# Patient Record
Sex: FEMALE | Race: BLACK OR AFRICAN AMERICAN | NOT HISPANIC OR LATINO | Employment: OTHER | URBAN - METROPOLITAN AREA
[De-identification: names, ages, dates, MRNs, and addresses within clinical notes are randomized per-mention and may not be internally consistent; named-entity substitution may affect disease eponyms.]

---

## 2022-12-03 ENCOUNTER — HOSPITAL ENCOUNTER (EMERGENCY)
Facility: HOSPITAL | Age: 62
Discharge: HOME | End: 2022-12-03
Attending: EMERGENCY MEDICINE
Payer: MEDICARE

## 2022-12-03 VITALS
HEART RATE: 70 BPM | DIASTOLIC BLOOD PRESSURE: 76 MMHG | TEMPERATURE: 98 F | BODY MASS INDEX: 36.1 KG/M2 | RESPIRATION RATE: 18 BRPM | WEIGHT: 230 LBS | SYSTOLIC BLOOD PRESSURE: 130 MMHG | HEIGHT: 67 IN | OXYGEN SATURATION: 98 %
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
## 2022-12-03 DIAGNOSIS — S09.90XA INJURY OF HEAD, INITIAL ENCOUNTER: ICD-10-CM

## 2022-12-03 DIAGNOSIS — S89.91XA RIGHT KNEE INJURY: ICD-10-CM

## 2022-12-03 DIAGNOSIS — R07.81 RIB PAIN ON RIGHT SIDE: ICD-10-CM

## 2022-12-03 PROCEDURE — 25000003 PHARM REV CODE 250: Performed by: REGISTERED NURSE

## 2022-12-03 PROCEDURE — 99284 EMERGENCY DEPT VISIT MOD MDM: CPT | Mod: 25

## 2022-12-03 RX ORDER — HYDROCODONE BITARTRATE AND ACETAMINOPHEN 10; 325 MG/1; MG/1
1 TABLET ORAL
Status: COMPLETED | OUTPATIENT
Start: 2022-12-03 | End: 2022-12-03

## 2022-12-03 RX ADMIN — HYDROCODONE BITARTRATE AND ACETAMINOPHEN 1 TABLET: 10; 325 TABLET ORAL at 11:12

## 2022-12-03 NOTE — ED NOTES
Patient identifiers verified and correct for Mirna ROE Aldair.    Pt present to er with compliant of a fall.    LOC: The patient is awake, alert and aware of environment with an appropriate affect, the patient is oriented x 3 and speaking appropriately.  APPEARANCE: Patient resting comfortably and in no acute distress, patient is clean and well groomed, patient's clothing is properly fastened.  SKIN: The skin is warm and dry, color consistent with ethnicity, patient has normal skin turgor and moist mucus membranes, skin intact, no breakdown or bruising noted.  MUSCULOSKELETAL: Patient moving all extremities spontaneously.  RESPIRATORY: Airway is open and patent, respirations are spontaneous.  CARDIAC: Patient has a normal rate, no periphreal edema noted, capillary refill < 3 seconds.  ABDOMEN: Soft and non tender to palpation.

## 2022-12-03 NOTE — ED PROVIDER NOTES
Encounter Date: 12/3/2022       History     Chief Complaint   Patient presents with    Fall     Fell while getting out of a truck fan fell due to neuropathy, hit her right leg, right head and right rib pain. Takes Lovenox.  No loss of consciousness.      62-year-old female presents emergency department after a slip trip fall earlier today.  Patient reports history of neuropathy and slipped on wet concrete hitting her right knee, ribs and head.  Patient does take blood thinners.  Patient denies any loss of consciousness, nausea/vomiting, chest pain, shortness of breath, weakness or any other symptoms at this time.    The history is provided by the patient.   Review of patient's allergies indicates:   Allergen Reactions    No known drug allergies      Past Medical History:   Diagnosis Date    Arthritis     Asthma     C. difficile colitis     Depression     Diabetes mellitus, type 2     Diabetic neuropathy     DVT (deep venous thrombosis)     Hyperlipidemia     Hypertension     Internal hemorrhoids     Pulmonary embolism     Rheumatoid arthritis(714.0)      Past Surgical History:   Procedure Laterality Date    CARPAL TUNNEL RELEASE Left     CHOLECYSTECTOMY      KNEE ARTHROSCOPY      KNEE SURGERY      TUBAL LIGATION       Family History   Problem Relation Age of Onset    Heart disease Mother     Hypertension Mother     Diabetes Father     Stroke Father     Hypertension Father     Stroke Maternal Aunt     Stroke Maternal Aunt     Stroke Sister     Breast cancer Neg Hx     Colon cancer Neg Hx     Ovarian cancer Neg Hx      Social History     Tobacco Use    Smoking status: Former     Packs/day: 0.25     Types: Cigarettes     Quit date: 2015     Years since quittin.6    Smokeless tobacco: Never    Tobacco comments:      PPD   Substance Use Topics    Alcohol use: No     Comment: pt states quit drinking 1 mo 1/2 ago    Drug use: No     Review of Systems   Constitutional:  Negative for fever.   HENT:  Negative for  sore throat.         +head injury   Respiratory:  Negative for shortness of breath.         +right rib pain   Cardiovascular:  Negative for chest pain.   Gastrointestinal:  Negative for nausea.   Genitourinary:  Negative for dysuria.   Musculoskeletal:  Positive for arthralgias. Negative for back pain.   Skin:  Negative for rash.   Neurological:  Positive for headaches. Negative for weakness.   Hematological:  Does not bruise/bleed easily.   All other systems reviewed and are negative.    Physical Exam     Initial Vitals [12/03/22 1111]   BP Pulse Resp Temp SpO2   131/75 65 20 98.4 °F (36.9 °C) 98 %      MAP       --         Physical Exam    Constitutional: She appears well-developed and well-nourished. She is not diaphoretic. No distress.   HENT:   Head: Normocephalic. Head is with contusion. Head is without raccoon's eyes and without Villalobos's sign.       Eyes: Conjunctivae and EOM are normal. Pupils are equal, round, and reactive to light.   Neck: Neck supple.   Normal range of motion.  Cardiovascular:  Normal rate, regular rhythm and normal heart sounds.           No murmur heard.  Pulmonary/Chest: Breath sounds normal. No respiratory distress. She has no wheezes. She has no rales. She exhibits tenderness.     Abdominal: Abdomen is soft. Bowel sounds are normal. There is no abdominal tenderness. There is no rebound and no guarding.   Musculoskeletal:         General: No edema. Normal range of motion.      Cervical back: Normal range of motion and neck supple.      Right knee: Tenderness present.        Legs:      Neurological: She is alert and oriented to person, place, and time. No cranial nerve deficit. GCS score is 15. GCS eye subscore is 4. GCS verbal subscore is 5. GCS motor subscore is 6.   Skin: Skin is warm and dry. Capillary refill takes less than 2 seconds.   Psychiatric: She has a normal mood and affect. Thought content normal.       ED Course   Procedures  Labs Reviewed - No data to display        Imaging Results              X-Ray Ribs 2 View Right (Final result)  Result time 12/03/22 11:48:38      Final result by Shine Tello Jr., MD (12/03/22 11:48:38)                   Impression:      No displaced rib fracture is visible.  Either infiltrate or scarring within the right lower lobe.      Electronically signed by: Shine Tello Jr., MD  Date:    12/03/2022  Time:    11:48               Narrative:    EXAMINATION:  XR RIBS 2 VIEW RIGHT    CLINICAL HISTORY:  Unspecified fall, initial encounter    TECHNIQUE:  Three views of the right ribs were performed.    COMPARISON:  None    FINDINGS:  No displaced rib fracture is evident.  There are irregular linear opacities within the periphery of the right lower lobe.  No pleural fluid or pneumothorax.  Normal heart size.                                       X-Ray Knee Complete 4 Or More Views Right (Final result)  Result time 12/03/22 12:00:22      Final result by Shine Tello Jr., MD (12/03/22 12:00:22)                   Impression:      No acute findings.  Osteoarthritis.      Electronically signed by: Shine Tello Jr., MD  Date:    12/03/2022  Time:    12:00               Narrative:    EXAMINATION:  XR KNEE COMP 4 OR MORE VIEWS RIGHT    CLINICAL HISTORY:  Unspecified injury of right lower leg, initial encounter    TECHNIQUE:  Four views of the right knee.    COMPARISON:  None    FINDINGS:  No acute fractures.  Normal joint alignment.  Tricompartmental degenerative osteoarthritis most pronounced involving the lateral compartment.  There are marginal osteophytes.  No suprapatellar effusion.                                       CT Head Without Contrast (Final result)  Result time 12/03/22 11:45:20      Final result by Shine Tello Jr., MD (12/03/22 11:45:20)                   Impression:      No acute intracranial CT abnormality.  Metallic focus lying adjacent to the right orbit near the skin surface.    All CT scans at this facility use dose modulation,  iterative reconstruction, and/or weight base dosing when appropriate to reduce radiation dose to as low as reasonably achievable.      Electronically signed by: Shine Tello Jr., MD  Date:    12/03/2022  Time:    11:45               Narrative:    EXAMINATION:  CT HEAD WITHOUT CONTRAST    CLINICAL HISTORY:  Head trauma, moderate-severe;    TECHNIQUE:  Contiguous axial CT images were obtained from the skull base through the vertex without intravenous contrast.    COMPARISON:  Prior CT of the head from 11/10/2010.    FINDINGS:  Metallic fragment lying superolateral to the right orbit.  No intracranial hemorrhage.  No mass effect or midline shift. Normal parenchymal attenuation. The ventricles and sulci are normal in size and configuration. The paranasal sinuses and mastoid air cells are clear.  No concerning osseous findings.                                       Medications   HYDROcodone-acetaminophen  mg per tablet 1 tablet (1 tablet Oral Given 12/3/22 1155)         I discussed with patient and/or family/caretaker that evaluation in the ED does not suggest any emergent or life threatening medical conditions requiring immediate intervention beyond what was provided in the ED, and I believe patient is safe for discharge.  Regardless, an unremarkable evaluation in the ED does not preclude the development or presence of a serious of life threatening condition. As such, patient was instructed to return immediately for any worsening or change in current symptoms.    I discussed with patient and/or family/caretaker that negative X-ray does not rule out occult fracture or other soft tissue injury.  Persistent pain greater than 7-10 days or increased pain requires follow up, specifically with orthopedics.                       Clinical Impression:   Final diagnoses:  [S89.91XA] Right knee injury  [W19.XXXA] Fall (Primary)  [S09.90XA] Injury of head, initial encounter  [R07.81] Rib pain on right side      ED Disposition  Condition    Discharge Stable          ED Prescriptions    None       Follow-up Information       Follow up With Specialties Details Why Contact Info    Jorge Rabago MD Family Medicine In 1 week  [address] [facility] [address]  [phone]      [facility] Emergency Dept. Emergency Medicine  If symptoms worsen [id number] [facility] [id number] [phone]             Branden Cid Jr., FNP  12/03/22 1314

## 2023-09-21 ENCOUNTER — TELEPHONE (OUTPATIENT)
Dept: HEMATOLOGY/ONCOLOGY | Facility: CLINIC | Age: 63
End: 2023-09-21
Payer: MEDICARE

## 2023-09-21 NOTE — TELEPHONE ENCOUNTER
Spoke with the pt about her appt with dr guzman as a new pt from a referral from [facility]

## 2023-11-10 ENCOUNTER — TELEPHONE (OUTPATIENT)
Dept: FAMILY MEDICINE | Facility: CLINIC | Age: 63
End: 2023-11-10
Payer: MEDICARE

## 2023-11-10 NOTE — TELEPHONE ENCOUNTER
----- Message from Monika Francois sent at 11/10/2023  9:57 AM CST -----  Regarding: patient call back  Type: Patient Call Back    Who called: Self     What is the request in detail: Said that she will not be able to come today because the transportation service said it is too late and asked if there is any way that she could come in earlier.     Can the clinic reply by [facility]? No     Would the patient rather a call back or a response via [facility]? Call     Best call back number: .[phone]

## 2023-11-10 NOTE — TELEPHONE ENCOUNTER
Spoke with patient stated she needs a morning appointment or any appointment before noon , due to transportation problem. Appointment rescheduled at ths time. Patient given first available am appointment and appointment added to wait list. Patient verbalized understanding at this time.

## 2023-11-15 ENCOUNTER — TELEPHONE (OUTPATIENT)
Dept: HEMATOLOGY/ONCOLOGY | Facility: CLINIC | Age: 63
End: 2023-11-15
Payer: MEDICARE

## 2023-11-15 NOTE — TELEPHONE ENCOUNTER
----- Message from Leyla Buckley sent at 11/15/2023 10:49 AM CST -----  .Type:  Needs Medical Advice    Who Called: Lin at [facility]    Would the patient rather a call back or a response via [facility]? Call back  Best Call Back Number:   Additional Information:     Lin would like a call back in regards to a referral that was sent over on 9/21

## 2023-12-04 LAB — INR PPP: 2.1

## 2023-12-05 ENCOUNTER — ANTI-COAG VISIT (OUTPATIENT)
Dept: CARDIOLOGY | Facility: CLINIC | Age: 63
End: 2023-12-05
Payer: MEDICARE

## 2023-12-05 ENCOUNTER — LAB VISIT (OUTPATIENT)
Dept: LAB | Facility: HOSPITAL | Age: 63
End: 2023-12-05
Attending: INTERNAL MEDICINE
Payer: MEDICARE

## 2023-12-05 ENCOUNTER — OFFICE VISIT (OUTPATIENT)
Dept: HEMATOLOGY/ONCOLOGY | Facility: CLINIC | Age: 63
End: 2023-12-05
Payer: MEDICARE

## 2023-12-05 ENCOUNTER — PATIENT MESSAGE (OUTPATIENT)
Dept: CARDIOLOGY | Facility: CLINIC | Age: 63
End: 2023-12-05
Payer: MEDICARE

## 2023-12-05 VITALS
WEIGHT: 241.19 LBS | OXYGEN SATURATION: 95 % | BODY MASS INDEX: 37.85 KG/M2 | RESPIRATION RATE: 16 BRPM | SYSTOLIC BLOOD PRESSURE: 161 MMHG | DIASTOLIC BLOOD PRESSURE: 71 MMHG | HEART RATE: 65 BPM | HEIGHT: 67 IN

## 2023-12-05 DIAGNOSIS — Z79.01 WARFARIN ANTICOAGULATION: ICD-10-CM

## 2023-12-05 DIAGNOSIS — Z12.11 ENCOUNTER FOR SCREENING COLONOSCOPY: ICD-10-CM

## 2023-12-05 DIAGNOSIS — I82.90 VTE (VENOUS THROMBOEMBOLISM): ICD-10-CM

## 2023-12-05 DIAGNOSIS — C57.00 FALLOPIAN TUBE CANCER, CARCINOMA, UNSPECIFIED LATERALITY: Primary | ICD-10-CM

## 2023-12-05 DIAGNOSIS — Z79.01 LONG TERM (CURRENT) USE OF ANTICOAGULANTS: Primary | ICD-10-CM

## 2023-12-05 DIAGNOSIS — C57.00 FALLOPIAN TUBE CANCER, CARCINOMA, UNSPECIFIED LATERALITY: ICD-10-CM

## 2023-12-05 DIAGNOSIS — Z12.31 ENCOUNTER FOR SCREENING MAMMOGRAM FOR MALIGNANT NEOPLASM OF BREAST: ICD-10-CM

## 2023-12-05 LAB
ALBUMIN SERPL BCP-MCNC: 3.8 G/DL (ref 3.5–5.2)
ALP SERPL-CCNC: 86 U/L (ref 55–135)
ALT SERPL W/O P-5'-P-CCNC: 16 U/L (ref 10–44)
ANION GAP SERPL CALC-SCNC: 10 MMOL/L (ref 8–16)
AST SERPL-CCNC: 18 U/L (ref 10–40)
BASOPHILS # BLD AUTO: 0.02 K/UL (ref 0–0.2)
BASOPHILS NFR BLD: 0.4 % (ref 0–1.9)
BILIRUB SERPL-MCNC: 0.4 MG/DL (ref 0.1–1)
BUN SERPL-MCNC: 11 MG/DL (ref 8–23)
CALCIUM SERPL-MCNC: 9.5 MG/DL (ref 8.7–10.5)
CANCER AG125 SERPL-ACNC: 6 U/ML (ref 0–30)
CHLORIDE SERPL-SCNC: 102 MMOL/L (ref 95–110)
CO2 SERPL-SCNC: 30 MMOL/L (ref 23–29)
CREAT SERPL-MCNC: 1 MG/DL (ref 0.5–1.4)
DIFFERENTIAL METHOD: ABNORMAL
EOSINOPHIL # BLD AUTO: 0.2 K/UL (ref 0–0.5)
EOSINOPHIL NFR BLD: 3 % (ref 0–8)
ERYTHROCYTE [DISTWIDTH] IN BLOOD BY AUTOMATED COUNT: 15.4 % (ref 11.5–14.5)
EST. GFR  (NO RACE VARIABLE): >60 ML/MIN/1.73 M^2
GLUCOSE SERPL-MCNC: 132 MG/DL (ref 70–110)
HCT VFR BLD AUTO: 34.6 % (ref 37–48.5)
HGB BLD-MCNC: 11.9 G/DL (ref 12–16)
IMM GRANULOCYTES # BLD AUTO: 0.01 K/UL (ref 0–0.04)
IMM GRANULOCYTES NFR BLD AUTO: 0.2 % (ref 0–0.5)
LYMPHOCYTES # BLD AUTO: 1.7 K/UL (ref 1–4.8)
LYMPHOCYTES NFR BLD: 29.6 % (ref 18–48)
MCH RBC QN AUTO: 29.8 PG (ref 27–31)
MCHC RBC AUTO-ENTMCNC: 34.4 G/DL (ref 32–36)
MCV RBC AUTO: 87 FL (ref 82–98)
MONOCYTES # BLD AUTO: 0.4 K/UL (ref 0.3–1)
MONOCYTES NFR BLD: 6.9 % (ref 4–15)
NEUTROPHILS # BLD AUTO: 3.4 K/UL (ref 1.8–7.7)
NEUTROPHILS NFR BLD: 59.9 % (ref 38–73)
NRBC BLD-RTO: 0 /100 WBC
PLATELET # BLD AUTO: 257 K/UL (ref 150–450)
PMV BLD AUTO: 10.2 FL (ref 9.2–12.9)
POTASSIUM SERPL-SCNC: 3.8 MMOL/L (ref 3.5–5.1)
PROT SERPL-MCNC: 7.9 G/DL (ref 6–8.4)
RBC # BLD AUTO: 3.99 M/UL (ref 4–5.4)
SODIUM SERPL-SCNC: 142 MMOL/L (ref 136–145)
WBC # BLD AUTO: 5.65 K/UL (ref 3.9–12.7)

## 2023-12-05 PROCEDURE — 3077F SYST BP >= 140 MM HG: CPT | Mod: CPTII,S$GLB,, | Performed by: INTERNAL MEDICINE

## 2023-12-05 PROCEDURE — 3008F BODY MASS INDEX DOCD: CPT | Mod: CPTII,S$GLB,, | Performed by: INTERNAL MEDICINE

## 2023-12-05 PROCEDURE — 3052F HG A1C>EQUAL 8.0%<EQUAL 9.0%: CPT | Mod: CPTII,S$GLB,, | Performed by: INTERNAL MEDICINE

## 2023-12-05 PROCEDURE — 3008F PR BODY MASS INDEX (BMI) DOCUMENTED: ICD-10-PCS | Mod: CPTII,S$GLB,, | Performed by: INTERNAL MEDICINE

## 2023-12-05 PROCEDURE — 1159F PR MEDICATION LIST DOCUMENTED IN MEDICAL RECORD: ICD-10-PCS | Mod: CPTII,S$GLB,, | Performed by: INTERNAL MEDICINE

## 2023-12-05 PROCEDURE — 1160F RVW MEDS BY RX/DR IN RCRD: CPT | Mod: CPTII,S$GLB,, | Performed by: INTERNAL MEDICINE

## 2023-12-05 PROCEDURE — 3288F FALL RISK ASSESSMENT DOCD: CPT | Mod: CPTII,S$GLB,, | Performed by: INTERNAL MEDICINE

## 2023-12-05 PROCEDURE — 36415 COLL VENOUS BLD VENIPUNCTURE: CPT | Performed by: INTERNAL MEDICINE

## 2023-12-05 PROCEDURE — 80053 COMPREHEN METABOLIC PANEL: CPT | Performed by: INTERNAL MEDICINE

## 2023-12-05 PROCEDURE — 99205 PR OFFICE/OUTPT VISIT, NEW, LEVL V, 60-74 MIN: ICD-10-PCS | Mod: S$GLB,,, | Performed by: INTERNAL MEDICINE

## 2023-12-05 PROCEDURE — 3078F DIAST BP <80 MM HG: CPT | Mod: CPTII,S$GLB,, | Performed by: INTERNAL MEDICINE

## 2023-12-05 PROCEDURE — 1160F PR REVIEW ALL MEDS BY PRESCRIBER/CLIN PHARMACIST DOCUMENTED: ICD-10-PCS | Mod: CPTII,S$GLB,, | Performed by: INTERNAL MEDICINE

## 2023-12-05 PROCEDURE — 3288F PR FALLS RISK ASSESSMENT DOCUMENTED: ICD-10-PCS | Mod: CPTII,S$GLB,, | Performed by: INTERNAL MEDICINE

## 2023-12-05 PROCEDURE — 1101F PR PT FALLS ASSESS DOC 0-1 FALLS W/OUT INJ PAST YR: ICD-10-PCS | Mod: CPTII,S$GLB,, | Performed by: INTERNAL MEDICINE

## 2023-12-05 PROCEDURE — 1159F MED LIST DOCD IN RCRD: CPT | Mod: CPTII,S$GLB,, | Performed by: INTERNAL MEDICINE

## 2023-12-05 PROCEDURE — 99205 OFFICE O/P NEW HI 60 MIN: CPT | Mod: S$GLB,,, | Performed by: INTERNAL MEDICINE

## 2023-12-05 PROCEDURE — 99999 PR PBB SHADOW E&M-EST. PATIENT-LVL V: ICD-10-PCS | Mod: PBBFAC,,, | Performed by: INTERNAL MEDICINE

## 2023-12-05 PROCEDURE — 3078F PR MOST RECENT DIASTOLIC BLOOD PRESSURE < 80 MM HG: ICD-10-PCS | Mod: CPTII,S$GLB,, | Performed by: INTERNAL MEDICINE

## 2023-12-05 PROCEDURE — 99999 PR PBB SHADOW E&M-EST. PATIENT-LVL V: CPT | Mod: PBBFAC,,, | Performed by: INTERNAL MEDICINE

## 2023-12-05 PROCEDURE — 85025 COMPLETE CBC W/AUTO DIFF WBC: CPT | Performed by: INTERNAL MEDICINE

## 2023-12-05 PROCEDURE — 3052F PR MOST RECENT HEMOGLOBIN A1C LEVEL 8.0 - < 9.0%: ICD-10-PCS | Mod: CPTII,S$GLB,, | Performed by: INTERNAL MEDICINE

## 2023-12-05 PROCEDURE — 86304 IMMUNOASSAY TUMOR CA 125: CPT | Performed by: INTERNAL MEDICINE

## 2023-12-05 PROCEDURE — 3077F PR MOST RECENT SYSTOLIC BLOOD PRESSURE >= 140 MM HG: ICD-10-PCS | Mod: CPTII,S$GLB,, | Performed by: INTERNAL MEDICINE

## 2023-12-05 PROCEDURE — 1101F PT FALLS ASSESS-DOCD LE1/YR: CPT | Mod: CPTII,S$GLB,, | Performed by: INTERNAL MEDICINE

## 2023-12-05 RX ORDER — INSULIN DEGLUDEC 100 U/ML
32 INJECTION, SOLUTION SUBCUTANEOUS
COMMUNITY
Start: 2023-09-28

## 2023-12-05 RX ORDER — TIRZEPATIDE 2.5 MG/.5ML
2.5 INJECTION, SOLUTION SUBCUTANEOUS WEEKLY
COMMUNITY
End: 2024-02-09 | Stop reason: SDUPTHER

## 2023-12-05 RX ORDER — OXYCODONE AND ACETAMINOPHEN 7.5; 325 MG/1; MG/1
1 TABLET ORAL EVERY 6 HOURS PRN
COMMUNITY
Start: 2023-11-14 | End: 2024-02-06 | Stop reason: SDUPTHER

## 2023-12-05 RX ORDER — BUDESONIDE AND FORMOTEROL FUMARATE DIHYDRATE 160; 4.5 UG/1; UG/1
2 AEROSOL RESPIRATORY (INHALATION) EVERY 12 HOURS
COMMUNITY

## 2023-12-05 RX ORDER — HYDROXYZINE HYDROCHLORIDE 10 MG/1
10 TABLET, FILM COATED ORAL 3 TIMES DAILY PRN
COMMUNITY

## 2023-12-05 RX ORDER — DRONABINOL 2.5 MG/1
2.5 CAPSULE ORAL DAILY
COMMUNITY
Start: 2023-12-04 | End: 2024-02-05 | Stop reason: SDUPTHER

## 2023-12-05 RX ORDER — ROSUVASTATIN CALCIUM 40 MG/1
20 TABLET, COATED ORAL DAILY
COMMUNITY

## 2023-12-05 RX ORDER — EZETIMIBE 10 MG/1
10 TABLET ORAL DAILY
COMMUNITY
End: 2024-02-29

## 2023-12-05 RX ORDER — LANSOPRAZOLE 30 MG/1
30 CAPSULE, DELAYED RELEASE ORAL DAILY
COMMUNITY
End: 2024-02-29

## 2023-12-05 RX ORDER — MIRTAZAPINE 15 MG/1
15 TABLET, FILM COATED ORAL NIGHTLY
COMMUNITY

## 2023-12-05 RX ORDER — WARFARIN 7.5 MG/1
7.5 TABLET ORAL
COMMUNITY
End: 2024-03-20 | Stop reason: SDUPTHER

## 2023-12-05 RX ORDER — VALSARTAN AND HYDROCHLOROTHIAZIDE 80; 12.5 MG/1; MG/1
1 TABLET, FILM COATED ORAL DAILY
COMMUNITY

## 2023-12-05 RX ORDER — INSULIN LISPRO 100 [IU]/ML
5 INJECTION, SOLUTION INTRAVENOUS; SUBCUTANEOUS SEE ADMIN INSTRUCTIONS
COMMUNITY
Start: 2023-10-23 | End: 2023-12-13

## 2023-12-05 RX ORDER — NIRAPARIB 100 MG/1
1 TABLET, FILM COATED ORAL EVERY MORNING
COMMUNITY
Start: 2023-08-21

## 2023-12-05 RX ORDER — TIZANIDINE HYDROCHLORIDE 4 MG/1
4 CAPSULE, GELATIN COATED ORAL 2 TIMES DAILY PRN
COMMUNITY
Start: 2022-12-16

## 2023-12-05 RX ORDER — MORPHINE SULFATE 15 MG/1
15 TABLET, FILM COATED, EXTENDED RELEASE ORAL
COMMUNITY
End: 2023-12-19 | Stop reason: SDUPTHER

## 2023-12-05 RX ORDER — FUROSEMIDE 40 MG/1
40 TABLET ORAL 2 TIMES DAILY
COMMUNITY

## 2023-12-05 NOTE — PROGRESS NOTES
Patient verified Coumadin as 7.5 mg tablet & yellow tablet taking 7.5mg daily, 0mg Wednesday.     Patient education completed regarding vitamin K diet, when to contact Coumadin Clinic, and Coumadin must be taken after 5 pm.  Diet plan is to eat high vitamin K foods 1x weekly. Pt takes 81mg Asprin daily that was prescribed.  Pt doesn't not dink alcohol regularly.    She uses [facility] in [place]   Phone [phone] Fax  [phone]

## 2023-12-06 NOTE — PROGRESS NOTES
Coumadin Clinic Enrollment:    64 yo F with PMH significant for HTN, HLD, DM2, DVT on warfarin, fallopian tube cancer, DJD and OA. Patients warfarin previously managed by [facility], now transitioning to [facility]. INR on Monday, 12/4/23 was therapeutic. Plan to continue patients home regimen and check INR next week. Pt has FM appt next week and hem/onc appt in 2 weeks - will f/u recs.

## 2023-12-06 NOTE — PROGRESS NOTES
PATIENT: Mirna Quinteros  MRN: [id number]  DATE: 12/6/2023    Diagnosis:   1. Fallopian tube cancer, carcinoma, unspecified laterality    2. Encounter for screening colonoscopy    3. Encounter for screening mammogram for malignant neoplasm of breast    4. Warfarin anticoagulation    5. VTE (venous thromboembolism)        Chief Complaint: Establish Care and fallopian tube carcinoma      Oncologic History:   Fallopian tube carcinoma (HCC)   8/21/2021 - Hospital Admission   To ED with abdominal pain    8/21/2021 Imaging   CT A/P  1. Findings consistent with peritoneal carcinomatosis.  2. Mild pelvic ascites.  3. No other acute process    8/21/2021 Other   Component  Latest Ref Rng & Units 8/21/2021   CEA  0.00 - 2.50 ng/mL 1.09   CA 19-9  0.0 - 40.0 U/mL 19.3     0.0 - 35.0 U/mL 144.0 (H)     8/22/2021 Imaging   CT Chest  1. No findings of thoracic metastatic disease.  2. Bilateral lower lung atelectasis.  3. No thoracic adenopathy.    8/23/2021 Biopsy   Diagnostic Lap - Dr. Jordan    A. Abdominal mass #1, biopsy:   Metastatic carcinoma; see comment.     B. Abdominal mass, biopsy:   Metastatic carcinoma; see comment.     C. Abdominal mass #2, biopsy:   Fibroconnective tissue with rare atypical cells.   Additional levels reviewed.     D. Abdominal mass #3, biopsy:   Metastatic carcinoma, compatible with high grade serous carcinoma.   See diagnostic comment.     E. Liver, segment 3, biopsy:   Hepatic parenchyma with scattered chronic inflammation, fibrosis, and rare atypical cells.   Additional levels reviewed.     8/26/2021 Imaging   Pelvic U/S  1. Fibroid uterus. Normal endometrial stripe thickness.  2. Nonvisualization of the ovaries.  3. Small amount of free fluid in the pelvis.    8/27/2021 - Chemotherapy   CARBOplatin AUC 6 / PACLitaxel 175mg/m2    C1 - In [facility]    C2 - Taxol to 135mg/m2 due to neuropathy    C3 - Held due to Covid +, hospital admission follow due to bacteremia  Decrease Carbo dose to AUC 4 due to  performance status    10/5/2021 Imaging   CT A/P (in ED due to nausea)  1. Normal CT appearance of the appendix. No acute process.  2. Decreased stranding in the omental fat suggesting response to treatment.  3. No lymphadenopathy.  4. Constipation. No bowel obstruction.  5. Bilateral airspace disease suggesting atypical pneumonia.  6. Prior cholecystectomy.    10/6/2021 - 10/8/2021 Hospital Admission   Covid pneumonia    10/14/2021 - Hospital Admission   TO ED due to fever    Treated    10/20/2021 Imaging   CT Chest  1. Interval removal of the right IJ port. There is a small hematoma at the site of port removal within the subcutaneous soft tissues of the anterior abdominal wall measuring 2.7 x 1.6 cm. Additionally, there is a filling defect within the right IJ/SVC measuring approximately 1.7 cm in length compatible with fibrin sheath versus thrombus.  2. Increasing bilateral peripheral somewhat wedge-shaped opacities within both lungs. Differential considerations include septic emboli, atypical/viral pneumonia, and pulmonary infarcts. Central pulmonary arteries appear patent    11/12/2021 Imaging   CT C/A/P (s/p 3 cycles C/T)  CHEST:  Moderate improvement in previously seen ill-defined parenchymal opacities with residual mild-to-moderate linear densities some of which have nodular thickening particularly in the left lung. Residual densities may represent residual inflammation/infection or areas of persistent post infectious scarring. A few of the more nodular components described in the left lung in the body of the report require continued follow-up.    0.3 cm subpleural right upper lobe lung nodule stable. Continued attention on follow-up imaging.    No lymphadenopathy.    ABDOMEN/PELVIS:  No convincing evidence of new metastatic disease.    No lymphadenopathy.    Very mild residual stranding seen within the omentum without nodularity.    No ascites.    Nonspecific mild varicosities involving the lower anterior  abdominal wall within the subcutaneous fat, stable.    12/13/2021 Surgery   Debulking, MARCELO, BSO, omentectomy, HIPEC, partial hepatectomy    BILATERAL FALLOPIAN TUBES: HIGH-GRADE SEROUS CARCINOMA     Procedure: Total hysterectomy and bilateral salpingo-oophorectomy   Specimen integrity: Right ovary - Capsule intact  Left ovary - Capsule intact  Right fallopian tube - Serosa intact  Left fallopian tube - Serosa intact   Tumor site: Bilateral fallopian tubes   Tumor size: See comment   Histologic type: High grade serous carcinoma   Histologic grade: High grade   Ovarian surface involvement: Not identified   Fallopian tube surface involvement: Not identified   Implants (for borderline tumors only): Not applicable   Other tissue/ organ involvement: Not identified   Largest extrapelvic peritoneal focus: Not applicable   Peritoneal/Ascitic fluid involvement: Not submitted/unknown   Chemotherapy response score (CRS): CRS3 (marked response with no or minimal residual cancer)   Regional lymph node status: All regional lymph nodes negative for tumor cells   No. of nodes with metastasis >10 mm: Not applicable   No. of nodes with metastasis <10 mm (excluding isolated tumor cells): Not applicable   No. of nodes with isolated tumor cells (0.2 mm or less): Not applicable   Number of lymph nodes examined: 1   Distant metastasis: Not applicable   AJCC 8th edition pathologic stage: pT1b, pN0,   pM not applicable - pM cannot be determined from the submitted specimen(s)  TNM Descriptors: y (posttreatment     Discharged 1/11/22. Extended hospital stay.     3/9/2022 Cancer Staged   Staging form: Ovary, Fallopian Tube, And Primary Peritoneal Carcinoma, AJCC 8th Edition  - Pathologic: FIGO Stage IIIC (pT3c, cM0) - Signed by Casimiro Forrester MD on 3/9/2022    3/21/2022 Imaging   CT C/A/P  1. No focal consolidation or nodule in the lung. Minimal subsegmental atelectasis/scarring.  2. Postsurgical changes in the liver, pelvis and along the  laparotomy scar. Small amount of fluid with air in the laparotomy scar could be due to an open wound, recommend clinical correlation.    4/1/2022 - Chemotherapy   Zejula 300mg daily    4/6 - Hold due to nausea and fatigue    4/11 - Plan to restart at 200mg daily    4/27 - Hold Zejula due to thrombocytopenia, neutropenia - Zejula not held until 4/29 5/18 - Okay to resume Zejula at 100mg daily    4/6/2022 Other   Genetic testing - Ms. Quinteros chose to proceed with [employer] xG panel.    5/9/2022 Imaging   CT A/P ( due to abdominal pain)   1. Postoperative changes again seen from a hysterectomy, bilateral salpingo-oophorectomy, omentectomy, and appendectomy. Residual infiltrative changes in the midline subcutaneous tissues again seen, compatible with postoperative scarring/granulation tissue. A chronic deep tract of gas and trace fluid along the abdominal wall musculature is not significantly changed. No enlarging or new fluid collections are identified in the anterior abdomino-pelvic wall.  2. No lymphadenopathy or evidence of metastatic disease in the abdomen or pelvis.  3. Persistent moderate linear atelectasis/scarring in the visualized lung bases, not significantly improved. If there are persistent/developing pulmonary symptoms, consider a follow-up CT chest.  6/7/2023: CT Chest- No evidence of metastatic disease in the chest. CT Abdomen & Pelvis- No evidence of recurrent or metastatic disease.           Subjective:    History of Present Illness: Ms. Quinteros is a 63 y.o. female with prior history of localized fallpian tube carcinoma s/p chemo, surgery, and on maintenance Zejula. She has recently located to the [facility] from [place] and presents toe stablish care with a new medical oncologist. She has no complaints per se but she does have numerous routine health concerns that she would like to attend to such as scheduling a mammogram, getting established with a new coumdin clinic, and referral for  screening colonoscopy.      Past medical, surgical, family, and social histories have been reviewed and updated below.    Past Medical History:   Past Medical History:   Diagnosis Date    Arthritis     Asthma     C. difficile colitis     Depression     Diabetes mellitus, type 2     Diabetic neuropathy     DVT (deep venous thrombosis)     Hyperlipidemia     Hypertension     Internal hemorrhoids     Pulmonary embolism     Rheumatoid arthritis(714.0)        Past Surgical History:   Past Surgical History:   Procedure Laterality Date    CARPAL TUNNEL RELEASE Left     CHOLECYSTECTOMY      KNEE ARTHROSCOPY      KNEE SURGERY      TUBAL LIGATION         Family History:   Family History   Problem Relation Age of Onset    Heart disease Mother     Hypertension Mother     Diabetes Father     Stroke Father     Hypertension Father     Stroke Maternal Aunt     Stroke Maternal Aunt     Stroke Sister     Breast cancer Neg Hx     Colon cancer Neg Hx     Ovarian cancer Neg Hx        Social History:  reports that she quit smoking about 8 years ago. Her smoking use included cigarettes. She has never used smokeless tobacco. She reports that she does not drink alcohol and does not use drugs.    Allergies:  Review of patient's allergies indicates:   Allergen Reactions    No known drug allergies        Medications:  Current Outpatient Medications   Medication Sig Dispense Refill    droNABinol (MARINOL) 2.5 MG capsule Take 2.5 mg by mouth once daily.      insulin degludec (TRESIBA FLEXTOUCH U-100) 100 unit/mL (3 mL) insulin pen Inject 32 Units into the skin.      insulin lispro 100 unit/mL pen Inject 5 Units into the skin As instructed.      oxyCODONE-acetaminophen (PERCOCET) 7.5-325 mg per tablet Take 1 tablet by mouth every 6 (six) hours as needed for Pain.      tiZANidine 4 mg Cap Take 4 mg by mouth 2 (two) times daily as needed.      ZEJULA 100 mg Tab Take 1 tablet by mouth every morning.      aspirin 81 mg Cap Take 81 mg by mouth once  "daily.      b complex vitamins tablet Take 1 tablet by mouth once daily.      BD INSULIN PEN NEEDLE UF MINI 31 x 3/16 " Ndle   0    ezetimibe (ZETIA) 10 mg tablet Take 10 mg by mouth once daily.      furosemide (LASIX) 40 MG tablet Take 40 mg by mouth 2 (two) times a day.      hydrOXYzine HCL (ATARAX) 10 MG Tab Take 10 mg by mouth 3 (three) times daily as needed.      insulin aspart (NOVOLOG FLEXPEN) 100 unit/mL InPn Inject 8 Units into the skin 3 (three) times daily with meals. 1 Box 6    insulin glargine (LANTUS SOLOSTAR) 100 unit/mL (3 mL) InPn pen Inject 32 Units into the skin every evening. 100 mL 3    lansoprazole (PREVACID) 30 MG capsule Take 30 mg by mouth once daily.      mirtazapine (REMERON) 15 MG tablet Take 15 mg by mouth every evening.      montelukast (SINGULAIR) 10 mg tablet Take 1 tablet (10 mg total) by mouth every evening. 90 tablet 1    morphine (MS CONTIN) 15 MG 12 hr tablet Take 15 mg by mouth.      MOUNJARO 2.5 mg/0.5 mL PnIj Inject 2.5 mg into the skin once a week.      nut.tx.gluc.intol,lac-free,soy (GLUCERNA SHAKE) Liqd Take 237 mLs by mouth 3 (three) times daily with meals. 90 Can 2    ondansetron (ZOFRAN) 4 MG tablet Take 1 tablet (4 mg total) by mouth every 8 (eight) hours as needed (Nausea and vomiting). 12 tablet 0    pantoprazole (PROTONIX) 40 MG tablet Take 1 tablet (40 mg total) by mouth once daily. 30 tablet 0    prenatal multivit-Ca-min-Fe-FA (PRENATAL VITAMIN FA) Tab Take 1 tablet by mouth Daily.      rosuvastatin (CRESTOR) 40 MG Tab Take 20 mg by mouth once daily.      SYMBICORT 160-4.5 mcg/actuation HFAA Inhale 2 puffs into the lungs every 12 (twelve) hours.      triamcinolone acetonide 0.1% (KENALOG) 0.1 % cream Apply topically 2 (two) times daily. 45 Tube 1    valsartan-hydrochlorothiazide (DIOVAN-HCT) 80-12.5 mg per tablet Take 1 tablet by mouth once daily.      warfarin (COUMADIN) 7.5 MG tablet Take 7.5 mg by mouth. Mon, Tue, Thurs, Fri, Sat, Sun. (Daily EXCEPT Wednesday)   " "    No current facility-administered medications for this visit.       Review of Systems  A comprehensive review of systems was performed; pertinent positives and negatives are noted in the HPI.      ECOG Performance Status:   ECOG SCORE    1 - Restricted in strenuous activity-ambulatory and able to carry out work of a light nature         Objective:      Vitals:   Vitals:    12/05/23 1059   BP: (!) 161/71   BP Location: Right arm   Patient Position: Sitting   BP Method: Large (Automatic)   Pulse: 65   Resp: 16   SpO2: 95%   Weight: 109.4 kg (241 lb 2.9 oz)   Height: 5' 7" (1.702 m)     BMI: Body mass index is 37.77 kg/m².    Physical Exam  Vitals and nursing note reviewed.   Constitutional:       General: She is not in acute distress.     Appearance: Normal appearance. She is not toxic-appearing.   HENT:      Head: Normocephalic and atraumatic.   Eyes:      General: No scleral icterus.     Conjunctiva/sclera: Conjunctivae normal.   Cardiovascular:      Rate and Rhythm: Normal rate and regular rhythm.      Heart sounds: Normal heart sounds. No murmur heard.  Pulmonary:      Effort: Pulmonary effort is normal. No respiratory distress.      Breath sounds: Normal breath sounds. No wheezing, rhonchi or rales.   Abdominal:      General: There is no distension.      Palpations: Abdomen is soft.      Tenderness: There is no abdominal tenderness.   Musculoskeletal:         General: No swelling, tenderness or deformity.      Cervical back: Neck supple. No tenderness.   Skin:     Coloration: Skin is not jaundiced.      Findings: No erythema.   Neurological:      General: No focal deficit present.      Mental Status: She is alert and oriented to person, place, and time.      Motor: No weakness.   Psychiatric:         Mood and Affect: Mood normal.         Behavior: Behavior normal.         Laboratory Data:  Labs have been reviewed.      Assessment:       1. Fallopian tube cancer, carcinoma, unspecified laterality    2. " Encounter for screening colonoscopy    3. Encounter for screening mammogram for malignant neoplasm of breast    4. Warfarin anticoagulation    5. VTE (venous thromboembolism)      Stg IIIC Fallopian tube carcinoma, s/p neoadjuvant chemo, resection+HIPEC, and currently on Zejula maintenance.   She will need to establish with local gyn onc.     Plan:     Refer to gyn onc.  Refer to coumading clinic.  Due for surveillance scans.  RTC with results of scans.  Continue Zejula in meantime.        Emil Burgess MD, FACP  Hematology/Oncology  [facility] - [address], [address]  [place] [address]  Phone: [phone]  Fax: [phone]      Total time of this visit, including time spent face to face with patient and/or via video/audio, and also in preparing for today's visit for MDM and documentation. (Medical Decision Making, including consideration of possible diagnoses, management options, complex medical record review, review of diagnostic tests and information, consideration and discussion of significant complications based on comorbidities, and discussion with providers involved with the care of the patient) 61 minutes. Greater than 50% was spent face to face with the patient counseling and coordinating care.

## 2023-12-12 ENCOUNTER — TELEPHONE (OUTPATIENT)
Dept: GYNECOLOGIC ONCOLOGY | Facility: CLINIC | Age: 63
End: 2023-12-12
Payer: MEDICARE

## 2023-12-12 NOTE — NURSING
New pt referral received from Dr Burgess for transfer of care to local GYN/ONC for maintenance Zejula for history of [id number] fallopian tube carcinoma.  LVM with for a call back to arrange new pt appointment. Direct navigator phone number provided to pt [phone]    Oncology Navigation   Intake  Cancer Type: Gynecologic (Stg IIIC Fallopian tube carcinoma, s/p neoadjuvant chemo, resection+HIPEC, and currently on Zejula maintenance.)  Internal / External Referral: Internal (Emil Burgess MD)  Date Worked: 12/12/23     Treatment        Medical Oncologist: Emil Burgess MD- pt recently moved to area and established care on 12/5/23     Per care everywhere last GYN/ONC appointment was 11/16/2022 with Dr Usama Kirkpatrick with [facility] in [facility]. Cancer history per Dr Kirkpatrick's note:     Mirna Quinteros is a 62 y.o. woman with stage IIIC HGSFT carcinoma diagnosed 8/23/2021. Underwent NACT with [facility], R0 IDS with HIPEC. HRD+, [id number]. Started 300mg Zejula 4/1. Dose reduced to 200mg daily due to nausea and fatigue. Again dose reduced to 100mg daily due to thrombocytopenia on 5/18/2022     ERIKA on today's exam. CA-125 normal.    Tolerating Zejula well. Continue 100mg dosing. Labs reviewed. Continue monthly labs.     Pain - Improved - Continues to have incisional pain. Stable pain control with dose reduction to oxycodone 10mg PO BID. Plan to decrease dose.  Rx percocet 7.5mg-325mg PO BID sent.  Is taking Gabapentin as well. Is taking tylenol in the middle of that.This was her chronic pain medication dosing prior to treatment. She will arrange a new chronic pain team once she arrives in [place].     LE DVT w associated LE edema - Edema improved with transition to Lovenox from Xarelto. 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 Partially occlusive chronic deep vein thrombosis of the left popliteal vein. Follows with Dr. Rodriguez (vascular medicine). Feet  are numb - toes and bottom of feet. This is stable since stopping chemotherapy. Sometimes has difficulty walking.     [id number] - Completed genetic counseling.      Patient is active. Is active with grandkids. Walked her grandson to school today   Is caring for niece and nephew as well. Patient has custody.             Acuity      Follow Up  No follow-ups on file.

## 2023-12-12 NOTE — NURSING
New referral received from Dr Burgess for transfer of care to local GYN/ONC for maintenance Zejula for history of [id number] fallopian tube carcinoma.  Pt called navigator back and name and  verified. Explained my role as nurse navigator and direct number provided. Options for GYN/ONC providers, all clinic locations and soonest availability discussed with pt. Pt scheduled to see Dr Hanson in the next available Tuesday [place] appointment per patient request. Pt declined sooner appointments at alternative locations. Patient encouraged to call for any questions or concerns about her care or appointments. Pt verbalized understanding. Date time and location of appointment reviewed with pt.

## 2023-12-13 ENCOUNTER — ANTI-COAG VISIT (OUTPATIENT)
Dept: CARDIOLOGY | Facility: CLINIC | Age: 63
End: 2023-12-13
Payer: MEDICARE

## 2023-12-13 ENCOUNTER — LAB VISIT (OUTPATIENT)
Dept: LAB | Facility: HOSPITAL | Age: 63
End: 2023-12-13
Payer: MEDICARE

## 2023-12-13 ENCOUNTER — TELEPHONE (OUTPATIENT)
Dept: FAMILY MEDICINE | Facility: CLINIC | Age: 63
End: 2023-12-13

## 2023-12-13 ENCOUNTER — OFFICE VISIT (OUTPATIENT)
Dept: FAMILY MEDICINE | Facility: CLINIC | Age: 63
End: 2023-12-13
Payer: MEDICARE

## 2023-12-13 VITALS
BODY MASS INDEX: 37.35 KG/M2 | HEART RATE: 55 BPM | WEIGHT: 238 LBS | DIASTOLIC BLOOD PRESSURE: 82 MMHG | SYSTOLIC BLOOD PRESSURE: 130 MMHG | OXYGEN SATURATION: 99 % | HEIGHT: 67 IN

## 2023-12-13 DIAGNOSIS — E11.65 UNCONTROLLED TYPE 2 DIABETES MELLITUS WITH HYPERGLYCEMIA: ICD-10-CM

## 2023-12-13 DIAGNOSIS — G43.109 MIGRAINE WITH AURA AND WITHOUT STATUS MIGRAINOSUS, NOT INTRACTABLE: ICD-10-CM

## 2023-12-13 DIAGNOSIS — M48.061 SPINAL STENOSIS, LUMBAR REGION, WITHOUT NEUROGENIC CLAUDICATION: ICD-10-CM

## 2023-12-13 DIAGNOSIS — Z79.01 LONG TERM (CURRENT) USE OF ANTICOAGULANTS: ICD-10-CM

## 2023-12-13 DIAGNOSIS — I82.90 VTE (VENOUS THROMBOEMBOLISM): ICD-10-CM

## 2023-12-13 DIAGNOSIS — Z79.01 WARFARIN ANTICOAGULATION: ICD-10-CM

## 2023-12-13 DIAGNOSIS — G47.01 INSOMNIA DUE TO MEDICAL CONDITION: ICD-10-CM

## 2023-12-13 DIAGNOSIS — I82.5Y3 CHRONIC DEEP VEIN THROMBOSIS (DVT) OF PROXIMAL VEIN OF BOTH LOWER EXTREMITIES: ICD-10-CM

## 2023-12-13 DIAGNOSIS — R92.8 ABNORMAL FINDINGS ON DIAGNOSTIC IMAGING OF BREAST: ICD-10-CM

## 2023-12-13 DIAGNOSIS — F41.8 ANXIETY WITH DEPRESSION: ICD-10-CM

## 2023-12-13 DIAGNOSIS — I82.90 VTE (VENOUS THROMBOEMBOLISM): Primary | ICD-10-CM

## 2023-12-13 DIAGNOSIS — I82.5Z2 CHRONIC DEEP VEIN THROMBOSIS (DVT) OF DISTAL VEIN OF LEFT LOWER EXTREMITY: ICD-10-CM

## 2023-12-13 DIAGNOSIS — Z00.00 ENCOUNTER FOR MEDICAL EXAMINATION TO ESTABLISH CARE: Primary | ICD-10-CM

## 2023-12-13 DIAGNOSIS — Z23 NEED FOR PROPHYLACTIC VACCINATION AND INOCULATION AGAINST INFLUENZA: ICD-10-CM

## 2023-12-13 DIAGNOSIS — C57.00 FALLOPIAN TUBE CANCER, CARCINOMA, UNSPECIFIED LATERALITY: ICD-10-CM

## 2023-12-13 DIAGNOSIS — R10.13 EPIGASTRIC PAIN: ICD-10-CM

## 2023-12-13 DIAGNOSIS — M06.9 RHEUMATOID ARTHRITIS INVOLVING MULTIPLE SITES, UNSPECIFIED WHETHER RHEUMATOID FACTOR PRESENT: ICD-10-CM

## 2023-12-13 DIAGNOSIS — I10 HYPERTENSION, UNSPECIFIED TYPE: Chronic | ICD-10-CM

## 2023-12-13 DIAGNOSIS — E78.2 COMBINED HYPERLIPIDEMIA ASSOCIATED WITH TYPE 2 DIABETES MELLITUS: Chronic | ICD-10-CM

## 2023-12-13 DIAGNOSIS — J44.9 COPD WITHOUT EXACERBATION: ICD-10-CM

## 2023-12-13 DIAGNOSIS — G56.03 CARPAL TUNNEL SYNDROME ON BOTH SIDES: ICD-10-CM

## 2023-12-13 DIAGNOSIS — R10.9 ABDOMINAL PAIN, UNSPECIFIED ABDOMINAL LOCATION: ICD-10-CM

## 2023-12-13 DIAGNOSIS — D68.59 HYPERCOAGULABLE STATE: ICD-10-CM

## 2023-12-13 DIAGNOSIS — E11.69 COMBINED HYPERLIPIDEMIA ASSOCIATED WITH TYPE 2 DIABETES MELLITUS: Chronic | ICD-10-CM

## 2023-12-13 DIAGNOSIS — M22.2X2 PATELLOFEMORAL DISORDER OF LEFT KNEE: ICD-10-CM

## 2023-12-13 PROBLEM — R10.11 RIGHT UPPER QUADRANT PAIN: Status: RESOLVED | Noted: 2021-08-22 | Resolved: 2023-12-13

## 2023-12-13 PROBLEM — R63.39 FEEDING DIFFICULTY IN ADULT: Status: RESOLVED | Noted: 2021-11-22 | Resolved: 2023-12-13

## 2023-12-13 PROBLEM — Z79.4 INSULIN DEPENDENT TYPE 2 DIABETES MELLITUS: Status: ACTIVE | Noted: 2021-08-22

## 2023-12-13 PROBLEM — M65.4 DE QUERVAIN'S TENOSYNOVITIS, BILATERAL: Status: ACTIVE | Noted: 2018-12-11

## 2023-12-13 PROBLEM — M18.0 ARTHRITIS OF CARPOMETACARPAL (CMC) JOINT OF BOTH THUMBS: Status: ACTIVE | Noted: 2018-11-20

## 2023-12-13 PROBLEM — R63.39 FEEDING DIFFICULTY IN ADULT: Status: ACTIVE | Noted: 2021-11-22

## 2023-12-13 PROBLEM — E11.9 TYPE 2 DIABETES MELLITUS WITHOUT COMPLICATION, WITH LONG-TERM CURRENT USE OF INSULIN: Status: ACTIVE | Noted: 2017-10-03

## 2023-12-13 PROBLEM — J96.91 RESPIRATORY FAILURE WITH HYPOXIA: Status: RESOLVED | Noted: 2021-10-06 | Resolved: 2023-12-13

## 2023-12-13 PROBLEM — Z12.11 ENCOUNTER FOR SCREENING COLONOSCOPY: Status: ACTIVE | Noted: 2020-11-30

## 2023-12-13 PROBLEM — G47.33 OSA ON CPAP: Status: ACTIVE | Noted: 2021-08-22

## 2023-12-13 PROBLEM — R60.0 LOCALIZED EDEMA: Status: ACTIVE | Noted: 2023-02-24

## 2023-12-13 PROBLEM — R06.02 SOB (SHORTNESS OF BREATH): Status: ACTIVE | Noted: 2023-02-24

## 2023-12-13 PROBLEM — R10.11 RIGHT UPPER QUADRANT PAIN: Status: ACTIVE | Noted: 2021-08-22

## 2023-12-13 PROBLEM — H25.13 NUCLEAR SENILE CATARACT OF BOTH EYES: Status: ACTIVE | Noted: 2017-10-03

## 2023-12-13 PROBLEM — D64.9 ANEMIA: Status: ACTIVE | Noted: 2021-12-23

## 2023-12-13 PROBLEM — K85.90 PANCREATITIS: Status: ACTIVE | Noted: 2018-09-23

## 2023-12-13 PROBLEM — M17.12 PRIMARY OSTEOARTHRITIS OF LEFT KNEE: Status: ACTIVE | Noted: 2020-11-23

## 2023-12-13 PROBLEM — M18.12 PRIMARY OSTEOARTHRITIS OF FIRST CARPOMETACARPAL JOINT OF LEFT HAND: Status: ACTIVE | Noted: 2019-07-02

## 2023-12-13 PROBLEM — K85.90 PANCREATITIS: Status: RESOLVED | Noted: 2018-09-23 | Resolved: 2023-12-13

## 2023-12-13 PROBLEM — E78.5 HYPERLIPIDEMIA: Status: ACTIVE | Noted: 2021-08-22

## 2023-12-13 PROBLEM — Z79.4 TYPE 2 DIABETES MELLITUS WITHOUT COMPLICATION, WITH LONG-TERM CURRENT USE OF INSULIN: Status: ACTIVE | Noted: 2017-10-03

## 2023-12-13 PROBLEM — G89.4 CHRONIC PAIN SYNDROME: Status: ACTIVE | Noted: 2021-08-22

## 2023-12-13 PROBLEM — J96.91 RESPIRATORY FAILURE WITH HYPOXIA: Status: ACTIVE | Noted: 2021-10-06

## 2023-12-13 PROBLEM — A08.11 ENTERITIS DUE TO NOROVIRUS: Status: RESOLVED | Noted: 2019-02-22 | Resolved: 2023-12-13

## 2023-12-13 PROBLEM — A08.11 ENTERITIS DUE TO NOROVIRUS: Status: ACTIVE | Noted: 2019-02-22

## 2023-12-13 PROBLEM — E11.9 INSULIN DEPENDENT TYPE 2 DIABETES MELLITUS: Status: ACTIVE | Noted: 2021-08-22

## 2023-12-13 PROBLEM — I82.509 CHRONIC DEEP VEIN THROMBOSIS (DVT): Status: ACTIVE | Noted: 2021-08-28

## 2023-12-13 PROBLEM — H18.519 FUCHS' ENDOTHELIAL DYSTROPHY: Status: ACTIVE | Noted: 2017-11-07

## 2023-12-13 LAB
INR PPP: 2 (ref 0.8–1.2)
PROTHROMBIN TIME: 21.2 SEC (ref 9–12.5)

## 2023-12-13 PROCEDURE — 93793 ANTICOAG MGMT PT WARFARIN: CPT | Mod: S$GLB,,,

## 2023-12-13 PROCEDURE — 1101F PT FALLS ASSESS-DOCD LE1/YR: CPT | Mod: CPTII,S$GLB,,

## 2023-12-13 PROCEDURE — 93793 PR ANTICOAGULANT MGMT FOR PT TAKING WARFARIN: ICD-10-PCS | Mod: S$GLB,,,

## 2023-12-13 PROCEDURE — 3075F SYST BP GE 130 - 139MM HG: CPT | Mod: CPTII,S$GLB,,

## 2023-12-13 PROCEDURE — 90694 VACC AIIV4 NO PRSRV 0.5ML IM: CPT | Mod: S$GLB,,,

## 2023-12-13 PROCEDURE — 3079F DIAST BP 80-89 MM HG: CPT | Mod: CPTII,S$GLB,,

## 2023-12-13 PROCEDURE — 85610 PROTHROMBIN TIME: CPT | Performed by: INTERNAL MEDICINE

## 2023-12-13 PROCEDURE — 99204 OFFICE O/P NEW MOD 45 MIN: CPT | Mod: S$GLB,,,

## 2023-12-13 PROCEDURE — 3008F BODY MASS INDEX DOCD: CPT | Mod: CPTII,S$GLB,,

## 2023-12-13 PROCEDURE — 3288F FALL RISK ASSESSMENT DOCD: CPT | Mod: CPTII,S$GLB,,

## 2023-12-13 PROCEDURE — 36415 COLL VENOUS BLD VENIPUNCTURE: CPT | Performed by: INTERNAL MEDICINE

## 2023-12-13 PROCEDURE — G0008 ADMIN INFLUENZA VIRUS VAC: HCPCS | Mod: S$GLB,,,

## 2023-12-13 RX ORDER — SERTRALINE HYDROCHLORIDE 100 MG/1
TABLET, FILM COATED ORAL
COMMUNITY

## 2023-12-13 RX ORDER — SUMATRIPTAN 50 MG/1
TABLET, FILM COATED ORAL
COMMUNITY

## 2023-12-13 NOTE — TELEPHONE ENCOUNTER
----- Message from JOSE RAMON Mcdonough sent at 12/13/2023  4:39 PM CST -----  Please let Mrs. Bradley know her hgba1c is better than it was 4 months ago- went from 8.1 to 7.2, continue as is. Thyroid levels are good. And even though I technically didn't order the INR let her know it is 2.0; since I am not the one managing her coumadin she really needs to defer to that providers instructions on dose adjustment.

## 2023-12-13 NOTE — PROGRESS NOTES
[facility]    2023 3:55 PM    Assessment/Plan:    Patient presents today with therapeutic INR.    Recommendation for patient's warfarin regimen: Continue current maintenance dose    Recommend repeat INR in 3 weeks  _________________________________________________________________    Mirna JYOTHI Aldair (63 y.o.) is followed by the [facility].    Anticoagulation Summary  As of 2023      INR goal:  2.0-2.5   TTR:  --   INR used for dosin.0 (2023)   Warfarin maintenance plan:  0 mg every Wed; 7.5 mg (7.5 mg x 1) all other days   Weekly warfarin total:  45 mg   No change documented:  Evon Sarmiento, Alondra   Plan last modified:  Evon Sarmiento PharmD (2023)   Next INR check:  2023   Target end date:      Indications    VTE (venous thromboembolism) [I82.90]  Warfarin anticoagulation [Z79.01]  Long term (current) use of anticoagulants [Z79.01]                 Anticoagulation Episode Summary       INR check location:      Preferred lab:      Send INR reminders to:  ANTOINE CABRERA PROVIDER    Comments:  [facility] [place] Phone   [phone] Fax   [phone]          Anticoagulation Care Providers       Provider Role Specialty Phone number    Emil Burgess MD [facility] Medical Oncology [phone]

## 2023-12-13 NOTE — TELEPHONE ENCOUNTER
Spoke with patient and informed of Nurse Practitioner Rito message. Patient verbalized understanding and has already been in contact with the provider who manages her coumadin and a plan has already been set.     Patient had no further questions for NP Rito at this time.

## 2023-12-14 ENCOUNTER — HOSPITAL ENCOUNTER (OUTPATIENT)
Dept: RADIOLOGY | Facility: HOSPITAL | Age: 63
Discharge: HOME | End: 2023-12-14
Attending: INTERNAL MEDICINE
Payer: MEDICARE

## 2023-12-14 DIAGNOSIS — C57.00 FALLOPIAN TUBE CANCER, CARCINOMA, UNSPECIFIED LATERALITY: ICD-10-CM

## 2023-12-14 PROBLEM — R06.02 SOB (SHORTNESS OF BREATH): Status: RESOLVED | Noted: 2023-02-24 | Resolved: 2023-12-14

## 2023-12-14 PROBLEM — R92.8 ABNORMAL FINDINGS ON DIAGNOSTIC IMAGING OF BREAST: Status: ACTIVE | Noted: 2023-12-14

## 2023-12-14 PROCEDURE — 74177 CT ABD & PELVIS W/CONTRAST: CPT | Mod: TC

## 2023-12-14 PROCEDURE — 25500020 PHARM REV CODE 255: Performed by: INTERNAL MEDICINE

## 2023-12-14 PROCEDURE — 71260 CT THORAX DX C+: CPT | Mod: TC

## 2023-12-14 RX ADMIN — IOHEXOL 100 ML: 350 INJECTION, SOLUTION INTRAVENOUS at 01:12

## 2023-12-19 ENCOUNTER — PATIENT MESSAGE (OUTPATIENT)
Dept: HEMATOLOGY/ONCOLOGY | Facility: CLINIC | Age: 63
End: 2023-12-19
Payer: MEDICARE

## 2023-12-19 ENCOUNTER — OFFICE VISIT (OUTPATIENT)
Dept: GYNECOLOGIC ONCOLOGY | Facility: CLINIC | Age: 63
End: 2023-12-19
Payer: MEDICARE

## 2023-12-19 VITALS
WEIGHT: 239.44 LBS | DIASTOLIC BLOOD PRESSURE: 101 MMHG | SYSTOLIC BLOOD PRESSURE: 168 MMHG | HEART RATE: 56 BPM | BODY MASS INDEX: 37.58 KG/M2 | HEIGHT: 67 IN

## 2023-12-19 DIAGNOSIS — Z01.818 EXAMINATION PRIOR TO CHEMOTHERAPY: Primary | ICD-10-CM

## 2023-12-19 DIAGNOSIS — C57.00 FALLOPIAN TUBE CANCER, CARCINOMA, UNSPECIFIED LATERALITY: Primary | ICD-10-CM

## 2023-12-19 DIAGNOSIS — C57.00 FALLOPIAN TUBE CANCER, CARCINOMA, UNSPECIFIED LATERALITY: ICD-10-CM

## 2023-12-19 PROCEDURE — 1101F PR PT FALLS ASSESS DOC 0-1 FALLS W/OUT INJ PAST YR: ICD-10-PCS | Mod: CPTII,S$GLB,, | Performed by: OBSTETRICS & GYNECOLOGY

## 2023-12-19 PROCEDURE — 3051F HG A1C>EQUAL 7.0%<8.0%: CPT | Mod: CPTII,S$GLB,, | Performed by: OBSTETRICS & GYNECOLOGY

## 2023-12-19 PROCEDURE — 3288F FALL RISK ASSESSMENT DOCD: CPT | Mod: CPTII,S$GLB,, | Performed by: OBSTETRICS & GYNECOLOGY

## 2023-12-19 PROCEDURE — 1101F PT FALLS ASSESS-DOCD LE1/YR: CPT | Mod: CPTII,S$GLB,, | Performed by: OBSTETRICS & GYNECOLOGY

## 2023-12-19 PROCEDURE — 3008F PR BODY MASS INDEX (BMI) DOCUMENTED: ICD-10-PCS | Mod: CPTII,S$GLB,, | Performed by: OBSTETRICS & GYNECOLOGY

## 2023-12-19 PROCEDURE — 99999 PR PBB SHADOW E&M-EST. PATIENT-LVL V: ICD-10-PCS | Mod: PBBFAC,,, | Performed by: OBSTETRICS & GYNECOLOGY

## 2023-12-19 PROCEDURE — 3008F BODY MASS INDEX DOCD: CPT | Mod: CPTII,S$GLB,, | Performed by: OBSTETRICS & GYNECOLOGY

## 2023-12-19 PROCEDURE — 3051F PR MOST RECENT HEMOGLOBIN A1C LEVEL 7.0 - < 8.0%: ICD-10-PCS | Mod: CPTII,S$GLB,, | Performed by: OBSTETRICS & GYNECOLOGY

## 2023-12-19 PROCEDURE — 3077F SYST BP >= 140 MM HG: CPT | Mod: CPTII,S$GLB,, | Performed by: OBSTETRICS & GYNECOLOGY

## 2023-12-19 PROCEDURE — 3080F PR MOST RECENT DIASTOLIC BLOOD PRESSURE >= 90 MM HG: ICD-10-PCS | Mod: CPTII,S$GLB,, | Performed by: OBSTETRICS & GYNECOLOGY

## 2023-12-19 PROCEDURE — 3077F PR MOST RECENT SYSTOLIC BLOOD PRESSURE >= 140 MM HG: ICD-10-PCS | Mod: CPTII,S$GLB,, | Performed by: OBSTETRICS & GYNECOLOGY

## 2023-12-19 PROCEDURE — 1159F MED LIST DOCD IN RCRD: CPT | Mod: CPTII,S$GLB,, | Performed by: OBSTETRICS & GYNECOLOGY

## 2023-12-19 PROCEDURE — 99205 OFFICE O/P NEW HI 60 MIN: CPT | Mod: S$GLB,,, | Performed by: OBSTETRICS & GYNECOLOGY

## 2023-12-19 PROCEDURE — 3288F PR FALLS RISK ASSESSMENT DOCUMENTED: ICD-10-PCS | Mod: CPTII,S$GLB,, | Performed by: OBSTETRICS & GYNECOLOGY

## 2023-12-19 PROCEDURE — 3080F DIAST BP >= 90 MM HG: CPT | Mod: CPTII,S$GLB,, | Performed by: OBSTETRICS & GYNECOLOGY

## 2023-12-19 PROCEDURE — 1159F PR MEDICATION LIST DOCUMENTED IN MEDICAL RECORD: ICD-10-PCS | Mod: CPTII,S$GLB,, | Performed by: OBSTETRICS & GYNECOLOGY

## 2023-12-19 PROCEDURE — 99205 PR OFFICE/OUTPT VISIT, NEW, LEVL V, 60-74 MIN: ICD-10-PCS | Mod: S$GLB,,, | Performed by: OBSTETRICS & GYNECOLOGY

## 2023-12-19 PROCEDURE — 99999 PR PBB SHADOW E&M-EST. PATIENT-LVL V: CPT | Mod: PBBFAC,,, | Performed by: OBSTETRICS & GYNECOLOGY

## 2023-12-19 RX ORDER — MORPHINE SULFATE 15 MG/1
15 TABLET, FILM COATED, EXTENDED RELEASE ORAL 2 TIMES DAILY
Qty: 60 TABLET | Refills: 0 | Status: CANCELLED | OUTPATIENT
Start: 2023-12-19 | End: 2024-01-18

## 2023-12-19 NOTE — PROGRESS NOTES
Subjective:      Patient ID: Mirna Quinteros is a 63 y.o. female.    Chief Complaint: No chief complaint on file.      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   Has recently moved to [place].     She is currently on maintenance therapy with Niraparib 100mg.   Pain regimen MS Contin 15mg, percocet prn, Tizanidine. Also uses marinol. Will plan to establish with palliative care team.     Coumadin     CT 12/24/2023 shows no evidence of disease.  12/6/2023  6 (pretreatment 144)    _________________________________________________________  Her oncologic history is as follows:  Fallopian tube carcinoma (HCC)   8/21/2021 - Hospital Admission   To ED with abdominal pain     8/21/2021 Imaging   CT A/P  1. Findings consistent with peritoneal carcinomatosis.  2. Mild pelvic ascites.  3. No other acute process     8/21/2021 Other   Component  Latest Ref Rng & Units 8/21/2021   CEA  0.00 - 2.50 ng/mL 1.09   CA 19-9  0.0 - 40.0 U/mL 19.3     0.0 - 35.0 U/mL 144.0 (H)      8/22/2021 Imaging   CT Chest  1. No findings of thoracic metastatic disease.  2. Bilateral lower lung atelectasis.  3. No thoracic adenopathy.     8/23/2021 Biopsy   Diagnostic Lap - Dr. Jordan     A. Abdominal mass #1, biopsy:   Metastatic carcinoma; see comment.      B. Abdominal mass, biopsy:   Metastatic carcinoma; see comment.      C. Abdominal mass #2, biopsy:   Fibroconnective tissue with rare atypical cells.   Additional levels reviewed.      D. Abdominal mass #3, biopsy:   Metastatic carcinoma, compatible with high grade serous carcinoma.   See diagnostic comment.      E. Liver, segment 3, biopsy:   Hepatic parenchyma with scattered chronic inflammation, fibrosis, and rare atypical cells.   Additional levels reviewed.      8/26/2021 Imaging   Pelvic U/S  1. Fibroid uterus. Normal endometrial stripe thickness.  2. Nonvisualization of the ovaries.  3. Small amount of free fluid in the pelvis.      8/27/2021 - Chemotherapy   CARBOplatin AUC 6 / PACLitaxel 175mg/m2     C1 - In [facility]     C2 - Taxol to 135mg/m2 due to neuropathy     C3 - Held due to Covid +, hospital admission follow due to bacteremia  Decrease Carbo dose to AUC 4 due to performance status     10/5/2021 Imaging   CT A/P (in ED due to nausea)  1. Normal CT appearance of the appendix. No acute process.  2. Decreased stranding in the omental fat suggesting response to treatment.  3. No lymphadenopathy.  4. Constipation. No bowel obstruction.  5. Bilateral airspace disease suggesting atypical pneumonia.  6. Prior cholecystectomy.     10/6/2021 - 10/8/2021 Hospital Admission   Covid pneumonia     10/14/2021 - Hospital Admission   TO ED due to fever     Treated     10/20/2021 Imaging   CT Chest  1. Interval removal of the right IJ port. There is a small hematoma at the site of port removal within the subcutaneous soft tissues of the anterior abdominal wall measuring 2.7 x 1.6 cm. Additionally, there is a filling defect within the right IJ/SVC measuring approximately 1.7 cm in length compatible with fibrin sheath versus thrombus.  2. Increasing bilateral peripheral somewhat wedge-shaped opacities within both lungs. Differential considerations include septic emboli, atypical/viral pneumonia, and pulmonary infarcts. Central pulmonary arteries appear patent     11/12/2021 Imaging   CT C/A/P (s/p 3 cycles C/T)  CHEST:  Moderate improvement in previously seen ill-defined parenchymal opacities with residual mild-to-moderate linear densities some of which have nodular thickening particularly in the left lung. Residual densities may represent residual inflammation/infection or areas of persistent post infectious scarring. A few of the more nodular components described in the left lung in the body of the report require continued follow-up.     0.3 cm subpleural right upper lobe lung nodule stable. Continued attention on follow-up imaging.     No  lymphadenopathy.     ABDOMEN/PELVIS:  No convincing evidence of new metastatic disease.     No lymphadenopathy.     Very mild residual stranding seen within the omentum without nodularity.     No ascites.     Nonspecific mild varicosities involving the lower anterior abdominal wall within the subcutaneous fat, stable.     12/13/2021 Surgery   Debulking, MARCELO, BSO, omentectomy, HIPEC, partial hepatectomy     BILATERAL FALLOPIAN TUBES: HIGH-GRADE SEROUS CARCINOMA      Procedure: Total hysterectomy and bilateral salpingo-oophorectomy   Specimen integrity: Right ovary - Capsule intact  Left ovary - Capsule intact  Right fallopian tube - Serosa intact  Left fallopian tube - Serosa intact   Tumor site: Bilateral fallopian tubes   Tumor size: See comment   Histologic type: High grade serous carcinoma   Histologic grade: High grade   Ovarian surface involvement: Not identified   Fallopian tube surface involvement: Not identified   Implants (for borderline tumors only): Not applicable   Other tissue/ organ involvement: Not identified   Largest extrapelvic peritoneal focus: Not applicable   Peritoneal/Ascitic fluid involvement: Not submitted/unknown   Chemotherapy response score (CRS): CRS3 (marked response with no or minimal residual cancer)   Regional lymph node status: All regional lymph nodes negative for tumor cells   No. of nodes with metastasis >10 mm: Not applicable   No. of nodes with metastasis <10 mm (excluding isolated tumor cells): Not applicable   No. of nodes with isolated tumor cells (0.2 mm or less): Not applicable   Number of lymph nodes examined: 1   Distant metastasis: Not applicable   AJCC 8th edition pathologic stage: pT1b, pN0,   pM not applicable - pM cannot be determined from the submitted specimen(s)  TNM Descriptors: y (posttreatment      Discharged 1/11/22. Extended hospital stay.      3/9/2022 Cancer Staged   Staging form: Ovary, Fallopian Tube, And Primary Peritoneal Carcinoma, AJCC 8th Edition  -  Pathologic: FIGO Stage IIIC (pT3c, cM0) - Signed by Casimiro Forrester MD on 3/9/2022     3/21/2022 Imaging   CT C/A/P  1. No focal consolidation or nodule in the lung. Minimal subsegmental atelectasis/scarring.  2. Postsurgical changes in the liver, pelvis and along the laparotomy scar. Small amount of fluid with air in the laparotomy scar could be due to an open wound, recommend clinical correlation.     4/1/2022 - Chemotherapy   Zejula 300mg daily     4/6 - Hold due to nausea and fatigue     4/11 - Plan to restart at 200mg daily     4/27 - Hold Zejula due to thrombocytopenia, neutropenia - Zejula not held until 4/29 5/18 - Okay to resume Zejula at 100mg daily     4/6/2022 Other   Genetic testing - Ms. Quinteros chose to proceed with [employer] xG panel.     5/9/2022 Imaging   CT A/P ( due to abdominal pain)   1. Postoperative changes again seen from a hysterectomy, bilateral salpingo-oophorectomy, omentectomy, and appendectomy. Residual infiltrative changes in the midline subcutaneous tissues again seen, compatible with postoperative scarring/granulation tissue. A chronic deep tract of gas and trace fluid along the abdominal wall musculature is not significantly changed. No enlarging or new fluid collections are identified in the anterior abdomino-pelvic wall.  2. No lymphadenopathy or evidence of metastatic disease in the abdomen or pelvis.  3. Persistent moderate linear atelectasis/scarring in the visualized lung bases, not significantly improved. If there are persistent/developing pulmonary symptoms, consider a follow-up CT chest.  6/7/2023: CT Chest- No evidence of metastatic disease in the chest. CT Abdomen & Pelvis- No evidence of recurrent or metastatic disease.      Review of Systems   Constitutional:  Negative for appetite change, chills, fatigue and fever.   HENT:  Negative for mouth sores.    Respiratory:  Negative for cough and shortness of breath.    Cardiovascular:  Negative for leg swelling.    Gastrointestinal:  Negative for abdominal pain, blood in stool, constipation and diarrhea.   Endocrine: Negative for cold intolerance.   Genitourinary:  Negative for dysuria and vaginal bleeding.   Musculoskeletal:  Negative for myalgias.   Skin:  Negative for rash.   Allergic/Immunologic: Negative.    Neurological:  Negative for weakness and numbness.   Hematological:  Negative for adenopathy. Does not bruise/bleed easily.   Psychiatric/Behavioral:  Negative for confusion.        Past Medical History:   Diagnosis Date    Arthritis     Asthma     C. difficile colitis     Depression     Diabetes mellitus, type 2     Diabetic neuropathy     DVT (deep venous thrombosis)     Enteritis due to Norovirus 02/22/2019    Feeding difficulty in adult 11/22/2021    Formatting of this note might be different from the original. Added automatically from request for surgery [id number] Last Assessment & Plan: Formatting of this note might be different from the original. · Patient with history of ovarian cancer s/p MARCELO/BSO, omentectomy, radical tumor debulking, cytoreduction, appendectomy, partial hepatectomy, and HIPEC per Dr. Forrester and Dr. Jordan 12/13 with postope    Hyperlipidemia     Hypertension     Internal hemorrhoids     Pancreatitis 09/23/2018    Last Assessment & Plan: Formatting of this note might be different from the original. Mirna is a  58 year old female presenting with abdominal pain.  CT abd/pevis w/o contrast revealed acute pancreatitis involving pancreatic head with no fluid collections/necrosis.  EGD from 9/24/2018 unremarkable. Assessment: Today patient is sitting up and interactive.  No acute stress obvious.   Reports pain in    Pulmonary embolism     Respiratory failure with hypoxia 10/06/2021    Rheumatoid arthritis(714.0)     Right upper quadrant pain 08/22/2021    SOB (shortness of breath) 02/24/2023     Past Surgical History:   Procedure Laterality Date    CARPAL TUNNEL RELEASE Left     CHOLECYSTECTOMY       KNEE ARTHROSCOPY      KNEE SURGERY      TUBAL LIGATION       Family History   Problem Relation Age of Onset    Heart disease Mother     Hypertension Mother     Diabetes Father     Stroke Father     Hypertension Father     Stroke Maternal Aunt     Stroke Maternal Aunt     Stroke Sister     Breast cancer Neg Hx     Colon cancer Neg Hx     Ovarian cancer Neg Hx      Social History     Socioeconomic History    Marital status: Single   Tobacco Use    Smoking status: Former     Current packs/day: 0.00     Types: Cigarettes     Quit date: 2015     Years since quittin.7    Smokeless tobacco: Never    Tobacco comments:      PPD   Substance and Sexual Activity    Alcohol use: No     Comment: pt states quit drinking 1 mo 1/2 ago    Drug use: No    Sexual activity: Not Currently     Social Determinants of Health     Financial Resource Strain: Low Risk  (2023)    Overall Financial Resource Strain (CARDIA)     Difficulty of Paying Living Expenses: Not very hard   Food Insecurity: Food Insecurity Present (2023)    Hunger Vital Sign     Worried About Running Out of Food in the Last Year: Sometimes true     Ran Out of Food in the Last Year: Sometimes true   Transportation Needs: No Transportation Needs (2023)    PRAPARE - Transportation     Lack of Transportation (Medical): No     Lack of Transportation (Non-Medical): No   Physical Activity: Insufficiently Active (2023)    Exercise Vital Sign     Days of Exercise per Week: 2 days     Minutes of Exercise per Session: 30 min   Stress: Stress Concern Present (2023)    [employer] - Occupational Stress Questionnaire     Feeling of Stress : Very much   Social Connections: Unknown (2023)    Social Connection and Isolation Panel [NHANES]     Frequency of Communication with Friends and Family: Twice a week     Frequency of Social Gatherings with Friends and Family: Once a week     Active Member of Clubs or Organizations:  "No     Attends Club or Organization Meetings: Patient declined     Marital Status: Never    Housing Stability: Low Risk  (12/4/2023)    Housing Stability Vital Sign     Unable to Pay for Housing in the Last Year: No     Number of Places Lived in the Last Year: 2     Unstable Housing in the Last Year: No     Current Outpatient Medications   Medication Sig    aspirin 81 mg Cap Take 81 mg by mouth once daily.    BD INSULIN PEN NEEDLE UF MINI 31 x 3/16 " Ndle     droNABinol (MARINOL) 2.5 MG capsule Take 2.5 mg by mouth once daily.    ezetimibe (ZETIA) 10 mg tablet Take 10 mg by mouth once daily.    furosemide (LASIX) 40 MG tablet Take 40 mg by mouth 2 (two) times a day.    hydrOXYzine HCL (ATARAX) 10 MG Tab Take 10 mg by mouth 3 (three) times daily as needed.    insulin aspart (NOVOLOG FLEXPEN) 100 unit/mL InPn Inject 8 Units into the skin 3 (three) times daily with meals.    insulin degludec (TRESIBA FLEXTOUCH U-100) 100 unit/mL (3 mL) insulin pen Inject 32 Units into the skin.    lansoprazole (PREVACID) 30 MG capsule Take 30 mg by mouth once daily.    mirtazapine (REMERON) 15 MG tablet Take 15 mg by mouth every evening.    montelukast (SINGULAIR) 10 mg tablet Take 1 tablet (10 mg total) by mouth every evening.    MOUNJARO 2.5 mg/0.5 mL PnIj Inject 2.5 mg into the skin once a week.    oxyCODONE-acetaminophen (PERCOCET) 7.5-325 mg per tablet Take 1 tablet by mouth every 6 (six) hours as needed for Pain.    rosuvastatin (CRESTOR) 40 MG Tab Take 20 mg by mouth once daily.    sertraline (ZOLOFT) 100 MG tablet Take by mouth.    sumatriptan (IMITREX) 50 MG tablet Take by mouth.    SYMBICORT 160-4.5 mcg/actuation HFAA Inhale 2 puffs into the lungs every 12 (twelve) hours.    tiZANidine 4 mg Cap Take 4 mg by mouth 2 (two) times daily as needed.    valsartan-hydrochlorothiazide (DIOVAN-HCT) 80-12.5 mg per tablet Take 1 tablet by mouth once daily.    warfarin (COUMADIN) 7.5 MG tablet Take 7.5 mg by mouth. Donald Meredith Thurs, " Fri, Sat, Sun. (Daily EXCEPT Wednesday)    ZEJULA 100 mg Tab Take 1 tablet by mouth every morning.    morphine (MS CONTIN) 15 MG 12 hr tablet Take 1 tablet (15 mg total) by mouth 2 (two) times daily.    ondansetron (ZOFRAN) 4 MG tablet Take 1 tablet (4 mg total) by mouth every 8 (eight) hours as needed (Nausea and vomiting).     No current facility-administered medications for this visit.     Review of patient's allergies indicates:   Allergen Reactions    Liraglutide Other (See Comments)     pancreatitis    Empagliflozin      Other reaction(s): yeast infxn    Glyburide      Other reaction(s): unknown    Metformin      Other reaction(s): GI upset    Pioglitazone      Other reaction(s): Swelling    Iohexol Itching     Patient given 50mg benadryl im and itching subsided       Objective:   Physical Exam:   Constitutional: She is oriented to person, place, and time. She appears well-developed and well-nourished.    HENT:   Head: Normocephalic and atraumatic.    Eyes: Pupils are equal, round, and reactive to light. EOM are normal.    Neck: No thyromegaly present.    Cardiovascular:  Normal rate, regular rhythm and intact distal pulses.             Pulmonary/Chest: Effort normal and breath sounds normal. No respiratory distress. She has no wheezes.        Abdominal: Soft. Bowel sounds are normal. She exhibits no distension and no mass. There is no abdominal tenderness.             Musculoskeletal: Normal range of motion and moves all extremeties.      Lymphadenopathy:     She has no cervical adenopathy.        Right: No supraclavicular adenopathy present.        Left: No supraclavicular adenopathy present.    Neurological: She is alert and oriented to person, place, and time.    Skin: Skin is warm and dry. No rash noted.    Psychiatric: She has a normal mood and affect.       Assessment:     1. Examination prior to chemotherapy    2. Fallopian tube cancer, carcinoma, unspecified laterality        Plan:     Orders Placed  This Encounter   Procedures    CBC Oncology        Comprehensive Metabolic Panel    Ambulatory referral/consult to [facility] Palliative Care       Establish care with gyn oncology as above.   Continue niraparib maintenance with q4 weeks labs and MD prechemo visit.   No evidence of disease by imaging and .     I spent approximately 60 minutes reviewing the available records and evaluating the patient, out of which over 50% of the time was spent face to face with the patient in counseling and coordinating this patient's care.

## 2023-12-20 ENCOUNTER — OFFICE VISIT (OUTPATIENT)
Dept: HEMATOLOGY/ONCOLOGY | Facility: CLINIC | Age: 63
End: 2023-12-20
Payer: MEDICARE

## 2023-12-20 DIAGNOSIS — C57.00 FALLOPIAN TUBE CANCER, CARCINOMA, UNSPECIFIED LATERALITY: Primary | ICD-10-CM

## 2023-12-20 DIAGNOSIS — Z79.01 WARFARIN ANTICOAGULATION: ICD-10-CM

## 2023-12-20 DIAGNOSIS — M17.0 OSTEOARTHRITIS OF BOTH KNEES, UNSPECIFIED OSTEOARTHRITIS TYPE: ICD-10-CM

## 2023-12-20 PROCEDURE — 99213 OFFICE O/P EST LOW 20 MIN: CPT | Mod: 95,,, | Performed by: INTERNAL MEDICINE

## 2023-12-20 PROCEDURE — 99213 PR OFFICE/OUTPT VISIT, EST, LEVL III, 20-29 MIN: ICD-10-PCS | Mod: 95,,, | Performed by: INTERNAL MEDICINE

## 2023-12-20 PROCEDURE — 3051F PR MOST RECENT HEMOGLOBIN A1C LEVEL 7.0 - < 8.0%: ICD-10-PCS | Mod: CPTII,95,, | Performed by: INTERNAL MEDICINE

## 2023-12-20 PROCEDURE — 3051F HG A1C>EQUAL 7.0%<8.0%: CPT | Mod: CPTII,95,, | Performed by: INTERNAL MEDICINE

## 2023-12-20 RX ORDER — MORPHINE SULFATE 15 MG/1
15 TABLET, FILM COATED, EXTENDED RELEASE ORAL 2 TIMES DAILY
Qty: 60 TABLET | Refills: 0 | Status: SHIPPED | OUTPATIENT
Start: 2023-12-20 | End: 2024-01-14 | Stop reason: SDUPTHER

## 2023-12-20 NOTE — PROGRESS NOTES
PATIENT: Mirna Quinteros  MRN: [id number]  DATE: 12/20/2023    The patient location is: her home  The chief complaint leading to consultation is: h/o fallopian tube cancer, follow-up    Visit type: audiovisual    Face to Face time with patient: 12 min      Each patient to whom he or she provides medical services by telemedicine is:  (1) informed of the relationship between the physician and patient and the respective role of any other health care provider with respect to management of the patient; and (2) notified that he or she may decline to receive medical services by telemedicine and may withdraw from such care at any time.      Subjective:     Chief complaint:  Chief Complaint   Patient presents with    Follow-up       Oncologic History:  Fallopian tube carcinoma (HCC)   8/21/2021 - Hospital Admission   To ED with abdominal pain    8/21/2021 Imaging   CT A/P  1. Findings consistent with peritoneal carcinomatosis.  2. Mild pelvic ascites.  3. No other acute process    8/21/2021 Other   Component  Latest Ref Rng & Units 8/21/2021   CEA  0.00 - 2.50 ng/mL 1.09   CA 19-9  0.0 - 40.0 U/mL 19.3     0.0 - 35.0 U/mL 144.0 (H)     8/22/2021 Imaging   CT Chest  1. No findings of thoracic metastatic disease.  2. Bilateral lower lung atelectasis.  3. No thoracic adenopathy.    8/23/2021 Biopsy   Diagnostic Lap - Dr. Jordan    A. Abdominal mass #1, biopsy:   Metastatic carcinoma; see comment.     B. Abdominal mass, biopsy:   Metastatic carcinoma; see comment.     C. Abdominal mass #2, biopsy:   Fibroconnective tissue with rare atypical cells.   Additional levels reviewed.     D. Abdominal mass #3, biopsy:   Metastatic carcinoma, compatible with high grade serous carcinoma.   See diagnostic comment.     E. Liver, segment 3, biopsy:   Hepatic parenchyma with scattered chronic inflammation, fibrosis, and rare atypical cells.   Additional levels reviewed.     8/26/2021 Imaging   Pelvic U/S  1. Fibroid uterus. Normal  endometrial stripe thickness.  2. Nonvisualization of the ovaries.  3. Small amount of free fluid in the pelvis.    8/27/2021 - Chemotherapy   CARBOplatin AUC 6 / PACLitaxel 175mg/m2    C1 - In H    C2 - Taxol to 135mg/m2 due to neuropathy    C3 - Held due to Covid +, hospital admission follow due to bacteremia  Decrease Carbo dose to AUC 4 due to performance status    10/5/2021 Imaging   CT A/P (in ED due to nausea)  1. Normal CT appearance of the appendix. No acute process.  2. Decreased stranding in the omental fat suggesting response to treatment.  3. No lymphadenopathy.  4. Constipation. No bowel obstruction.  5. Bilateral airspace disease suggesting atypical pneumonia.  6. Prior cholecystectomy.    10/6/2021 - 10/8/2021 Hospital Admission   Covid pneumonia    10/14/2021 - Hospital Admission   TO ED due to fever    Treated    10/20/2021 Imaging   CT Chest  1. Interval removal of the right IJ port. There is a small hematoma at the site of port removal within the subcutaneous soft tissues of the anterior abdominal wall measuring 2.7 x 1.6 cm. Additionally, there is a filling defect within the right IJ/SVC measuring approximately 1.7 cm in length compatible with fibrin sheath versus thrombus.  2. Increasing bilateral peripheral somewhat wedge-shaped opacities within both lungs. Differential considerations include septic emboli, atypical/viral pneumonia, and pulmonary infarcts. Central pulmonary arteries appear patent    11/12/2021 Imaging   CT C/A/P (s/p 3 cycles C/T)  CHEST:  Moderate improvement in previously seen ill-defined parenchymal opacities with residual mild-to-moderate linear densities some of which have nodular thickening particularly in the left lung. Residual densities may represent residual inflammation/infection or areas of persistent post infectious scarring. A few of the more nodular components described in the left lung in the body of the report require continued follow-up.    0.3 cm  subpleural right upper lobe lung nodule stable. Continued attention on follow-up imaging.    No lymphadenopathy.    ABDOMEN/PELVIS:  No convincing evidence of new metastatic disease.    No lymphadenopathy.    Very mild residual stranding seen within the omentum without nodularity.    No ascites.    Nonspecific mild varicosities involving the lower anterior abdominal wall within the subcutaneous fat, stable.    12/13/2021 Surgery   Debulking, MARCELO, BSO, omentectomy, HIPEC, partial hepatectomy    BILATERAL FALLOPIAN TUBES: HIGH-GRADE SEROUS CARCINOMA     Procedure: Total hysterectomy and bilateral salpingo-oophorectomy   Specimen integrity: Right ovary - Capsule intact  Left ovary - Capsule intact  Right fallopian tube - Serosa intact  Left fallopian tube - Serosa intact   Tumor site: Bilateral fallopian tubes   Tumor size: See comment   Histologic type: High grade serous carcinoma   Histologic grade: High grade   Ovarian surface involvement: Not identified   Fallopian tube surface involvement: Not identified   Implants (for borderline tumors only): Not applicable   Other tissue/ organ involvement: Not identified   Largest extrapelvic peritoneal focus: Not applicable   Peritoneal/Ascitic fluid involvement: Not submitted/unknown   Chemotherapy response score (CRS): CRS3 (marked response with no or minimal residual cancer)   Regional lymph node status: All regional lymph nodes negative for tumor cells   No. of nodes with metastasis >10 mm: Not applicable   No. of nodes with metastasis <10 mm (excluding isolated tumor cells): Not applicable   No. of nodes with isolated tumor cells (0.2 mm or less): Not applicable   Number of lymph nodes examined: 1   Distant metastasis: Not applicable   AJCC 8th edition pathologic stage: pT1b, pN0,   pM not applicable - pM cannot be determined from the submitted specimen(s)  TNM Descriptors: y (posttreatment     Discharged 1/11/22. Extended hospital stay.     3/9/2022 Cancer Staged    Staging form: Ovary, Fallopian Tube, And Primary Peritoneal Carcinoma, AJCC 8th Edition  - Pathologic: FIGO Stage IIIC (pT3c, cM0) - Signed by Casimiro Forrester MD on 3/9/2022    3/21/2022 Imaging   CT C/A/P  1. No focal consolidation or nodule in the lung. Minimal subsegmental atelectasis/scarring.  2. Postsurgical changes in the liver, pelvis and along the laparotomy scar. Small amount of fluid with air in the laparotomy scar could be due to an open wound, recommend clinical correlation.    4/1/2022 - Chemotherapy   Zejula 300mg daily    4/6 - Hold due to nausea and fatigue    4/11 - Plan to restart at 200mg daily    4/27 - Hold Zejula due to thrombocytopenia, neutropenia - Zejula not held until 4/29 5/18 - Okay to resume Zejula at 100mg daily    4/6/2022 Other   Genetic testing - Ms. Quinteros chose to proceed with [employer] xG panel.    5/9/2022 Imaging   CT A/P ( due to abdominal pain)   1. Postoperative changes again seen from a hysterectomy, bilateral salpingo-oophorectomy, omentectomy, and appendectomy. Residual infiltrative changes in the midline subcutaneous tissues again seen, compatible with postoperative scarring/granulation tissue. A chronic deep tract of gas and trace fluid along the abdominal wall musculature is not significantly changed. No enlarging or new fluid collections are identified in the anterior abdomino-pelvic wall.  2. No lymphadenopathy or evidence of metastatic disease in the abdomen or pelvis.  3. Persistent moderate linear atelectasis/scarring in the visualized lung bases, not significantly improved. If there are persistent/developing pulmonary symptoms, consider a follow-up CT chest.  6/7/2023: CT Chest- No evidence of metastatic disease in the chest. CT Abdomen & Pelvis- No evidence of recurrent or metastatic disease.            Interval History: Ms. Quinteros participates remotely in telemedicine follow-up today.   She is without complaints at this time. She established care with me  earlier this month at which time restaging scans were ordered. Those have been done and are ERIKA. She has established care with Dr. Hanson in the interim as well. She has no acute complaints at this time. She does note chronic BL knee arthritis and would like orthopedics referral for eval and recs.       Review of Systems   A comprehensive review of systems was performed; pertinent positives and negatives are noted in the HPI.        ECOG Performance Status:   ECOG SCORE    1 - Restricted in strenuous activity-ambulatory and able to carry out work of a light nature         Objective:      Vitals: There were no vitals filed for this visit.  BMI: There is no height or weight on file to calculate BMI.      Physical Exam:   No physical exam due to remote nature of the visit.        Laboratory Data:  WBC   Date Value Ref Range Status   12/05/2023 5.65 3.90 - 12.70 K/uL Final     Hemoglobin   Date Value Ref Range Status   12/05/2023 11.9 (L) 12.0 - 16.0 g/dL Final     Hematocrit   Date Value Ref Range Status   12/05/2023 34.6 (L) 37.0 - 48.5 % Final     Platelets   Date Value Ref Range Status   12/05/2023 257 150 - 450 K/uL Final     Gran # (ANC)   Date Value Ref Range Status   12/05/2023 3.4 1.8 - 7.7 K/uL Final     Gran %   Date Value Ref Range Status   12/05/2023 59.9 38.0 - 73.0 % Final       Chemistry        Component Value Date/Time     12/05/2023 1231    K 3.8 12/05/2023 1231     12/05/2023 1231    CO2 30 (H) 12/05/2023 1231    BUN 11 12/05/2023 1231    CREATININE 1.0 12/05/2023 1231     (H) 12/05/2023 1231        Component Value Date/Time    CALCIUM 9.5 12/05/2023 1231    ALKPHOS 86 12/05/2023 1231    AST 18 12/05/2023 1231    ALT 16 12/05/2023 1231    BILITOT 0.4 12/05/2023 1231    ESTGFRAFRICA >60 02/05/2016 1318    EGFRNONAA >60 02/05/2016 1318        CT Chest Abdomen Pelvis With IV Contrast (XPD) Routine Oral Contrast  Order: [id number]  Status: Final result       Visible to patient: Yes (seen)        Next appt: 01/03/2024 at 10:20 AM in Lab (LAB, [facility])       Dx: Fallopian tube cancer, carcinoma, uns...    0 Result Notes  Details    Reading Physician Reading Date Result Priority   Shahzad Thompson MD  [phone] 12/14/2023      Narrative & Impression  CMS MANDATED QUALITY DATA - CT RADIATION  436     All CT scans at this facility utilize dose modulation, iterative reconstruction, and/or weight based dosing when appropriate to reduce radiation dose to as low as reasonably achievable.     CT CHEST ABDOMEN PELVIS WITH IV CONTRAST     CLINICAL HISTORY:  63 years Female fallopian tube cancer--disease status monitoring     COMPARISON: None     FINDINGS: Review of osseous structures demonstrates mild degenerative changes of the spine, with lumbar levocurvature. No acute or aggressive osseous abnormality.     8 mm hypodense nodule in the left thyroid lobe. Linear parenchymal opacities involving the right middle lobe, left upper lobe, and bilateral lower lobes compatible with atelectasis or scarring. No suspicious pulmonary nodule or mass. No airspace disease or pleural effusion.     Atherosclerotic calcification of the aorta and coronary arteries. Central airways are patent. Esophagus is unremarkable. Normal size mediastinal lymph nodes.     Mild prominence of intrahepatic and extrahepatic bile ducts, likely on the basis of cholecystectomy change. Please correlate with bilirubin levels. No focal hepatic lesion. Portal vein is patent. Spleen, pancreas, and adrenal glands are unremarkable. Kidneys are within normal limits. Ureters are normal in caliber. Urinary bladder is largely collapsed.     Stomach is unremarkable. No evidence of small bowel obstruction. Appendix was not visualized. No findings of colitis.     No free fluid or lymphadenopathy within the abdomen or pelvis. Uterus is surgically absent. No adnexal mass is evident. Anterior abdominal wall scarring likely related to prior surgery.      IMPRESSION:     No CT evidence of residual/recurrent or metastatic malignancy involving the chest abdomen or pelvis. Correlation with any prior imaging would be helpful. PET/CT may provide a more sensitive assessment.     Aortic and coronary atherosclerosis.     Status post cholecystectomy and hysterectomy.     Subcentimeter incidental left thyroid nodule. No follow-up imaging is recommended.  Reference: J Am Yuly Radiol. 2015 Feb;12(2): 143-50     Electronically signed by:  Shahzad Thompson MD  12/14/2023 01:25 PM CST Workstation: [id number]           Specimen Collected: 12/14/23 12:41 CST Last Resulted: 12/14/23 13:25 CST               Assessment/Plan:     1. Fallopian tube cancer, carcinoma, unspecified laterality    2. Warfarin anticoagulation    3. Osteoarthritis of both knees, unspecified osteoarthritis type      ERIKA on scans. CA-125 wnl. Plan for 3 month follow-up.   Requesting referral to ortho to be evaluated for chronic knee arthritis limiting mobility.     Med and Orders:  Orders Placed This Encounter    Ambulatory referral/consult to Orthopedics       Follow Up:  Follow up in about 3 months (around 3/20/2024).      Above care plan was discussed with patient and all questions were addressed to their expressed satisfaction.       Emil Burgess MD, FACP  Hematology & Medical Oncology  [facility]     Total time of this visit, including time spent face to face with patient and/or via video/audio, and also in preparing for today's visit for MDM and documentation. (Medical Decision Making, including consideration of possible diagnoses, management options, complex medical record review, review of diagnostic tests and information, consideration and discussion of significant complications based on comorbidities, and discussion with providers involved with the care of the patient) 21 minutes. Greater than 50% was spent face to face with the patient counseling and coordinating care.

## 2023-12-29 DIAGNOSIS — M25.562 PAIN IN BOTH KNEES, UNSPECIFIED CHRONICITY: Primary | ICD-10-CM

## 2023-12-29 DIAGNOSIS — M25.561 PAIN IN BOTH KNEES, UNSPECIFIED CHRONICITY: Primary | ICD-10-CM

## 2024-01-01 NOTE — PROGRESS NOTES
"  SUBJECTIVE:    Patient ID: Mirna Quintreos is a 63 y.o. female.    Chief Complaint: Establish Care (No Bottles(has list)//New pt//establish care//labs recent 12/5/23 from hem-onc//mammo sched//Dexa 10/6/23//last pap 11/2022//need to ask if okay to get prevnar and flu while on chemo. )    63-year-old female patient presents to clinic today to establish care.  Patient has several chronic medical conditions and acute concerns today. Accompanied by her daughter today. Daughter verbalizes frustration that I am "asking the same questions the nurse already asked." Advised patient and daughter that I am reviewing the information provided to me by my nurse for thoroughness and is my responsibility as the provider to ensure that the patient receives the best, highest quality care. Patient verbalizes understanding and is very cooperative, and pleasant.    Past medical history, surgical history, pertinent family history, social history, current medications, allergies reviewed.    Reviewed most recent labs.    Patient has moved to this area and [place]  She [place] been seeing a hematologist/oncologist in [place].  She would like some referrals today to providers on the [place] for various problems.  She had like a referral to a women's health oncologist for history of fallopian tube cancer.  She is also recently had an abnormal mammogram.  She would like to have a diagnostic mammogram.  We will order this today.  Patient has chronic pain, including problems with her back and her knees.  She would like referral to an orthopedic doctor to manage her pain syndromes and discuss plan of care for her knees.  She had like a local referral to an endocrinologist to manage her diabetes.  I did note with her today that her hemoglobin A1c is down from 8 % to 7.2% which is good change.  Advised patient that few endocrinology providers on the [place] but we will make a " referral today to her request which is Dr. Sawyer.  Would also like a local referral to Hematology for history of DVT on Coumadin.  needs referral to Ophthalmology  Has been having abdominal and epigastric pain.  Would like a referral to Gastroenterology due to her history.    Had dexa scan in Oct. Normal bone density    Foot Exam Never done- does not want today  Eye Exam due on 03/18/2017- referral ordered  Shingles Vaccine(2 of 2) due on 05/25/2021-advised overdue  Mammogram due on 11/22/2022-ordered diagnostic as had recent abnormal screening  TETANUS VACCINE due on 09/17/2023- advised of situations where this should be updated  Hemoglobin A1c due on 06/13/2024  Lipid Panel due on 08/11/2024  Low Dose Statin due on 12/19/2024  DEXA Scan due on 10/06/2026  Colorectal Cancer Screening due on 12/22/2030  Hepatitis C Screening Completed     Ok for flu shot    The 10-year CVD risk score (ESHA'Marquis, et al., 2008) is: 31.5%    Values used to calculate the score:      Age: 63 years      Sex: Female      Diabetic: Yes      Tobacco smoker: No      Systolic Blood Pressure: 168 mmHg      Is BP treated: Yes      HDL Cholesterol: 71 mg/dL      Total Cholesterol: 189 mg/dL         Lab Visit on 12/13/2023   Component Date Value Ref Range Status    Hemoglobin A1C 12/13/2023 7.2 (H)  4.5 - 6.2 % Final    Estimated Avg Glucose 12/13/2023 160 (H)  68 - 131 mg/dL Final    TSH 12/13/2023 1.644  0.340 - 5.600 uIU/mL Final    Free T4 12/13/2023 0.86  0.71 - 1.51 ng/dL Final   Lab Visit on 12/13/2023   Component Date Value Ref Range Status    Prothrombin Time 12/13/2023 21.2 (H)  9.0 - 12.5 sec Final    INR 12/13/2023 2.0 (H)  0.8 - 1.2 Final   Anti-coag visit on 12/05/2023   Component Date Value Ref Range Status    INR 12/04/2023 2.1   Final   Lab Visit on 12/05/2023   Component Date Value Ref Range Status    WBC 12/05/2023 5.65  3.90 - 12.70 K/uL Final    RBC 12/05/2023 3.99 (L)  4.00 - 5.40 M/uL Final    Hemoglobin 12/05/2023 11.9  (L)  12.0 - 16.0 g/dL Final    Hematocrit 12/05/2023 34.6 (L)  37.0 - 48.5 % Final    MCV 12/05/2023 87  82 - 98 fL Final    MCH 12/05/2023 29.8  27.0 - 31.0 pg Final    MCHC 12/05/2023 34.4  32.0 - 36.0 g/dL Final    RDW 12/05/2023 15.4 (H)  11.5 - 14.5 % Final    Platelets 12/05/2023 257  150 - 450 K/uL Final    MPV 12/05/2023 10.2  9.2 - 12.9 fL Final    Immature Granulocytes 12/05/2023 0.2  0.0 - 0.5 % Final    Gran # (ANC) 12/05/2023 3.4  1.8 - 7.7 K/uL Final    Immature Grans (Abs) 12/05/2023 0.01  0.00 - 0.04 K/uL Final    Lymph # 12/05/2023 1.7  1.0 - 4.8 K/uL Final    Mono # 12/05/2023 0.4  0.3 - 1.0 K/uL Final    Eos # 12/05/2023 0.2  0.0 - 0.5 K/uL Final    Baso # 12/05/2023 0.02  0.00 - 0.20 K/uL Final    nRBC 12/05/2023 0  0 /100 WBC Final    Gran % 12/05/2023 59.9  38.0 - 73.0 % Final    Lymph % 12/05/2023 29.6  18.0 - 48.0 % Final    Mono % 12/05/2023 6.9  4.0 - 15.0 % Final    Eosinophil % 12/05/2023 3.0  0.0 - 8.0 % Final    Basophil % 12/05/2023 0.4  0.0 - 1.9 % Final    Differential Method 12/05/2023 Automated   Final    Sodium 12/05/2023 142  136 - 145 mmol/L Final    Potassium 12/05/2023 3.8  3.5 - 5.1 mmol/L Final    Chloride 12/05/2023 102  95 - 110 mmol/L Final    CO2 12/05/2023 30 (H)  23 - 29 mmol/L Final    Glucose 12/05/2023 132 (H)  70 - 110 mg/dL Final    BUN 12/05/2023 11  8 - 23 mg/dL Final    Creatinine 12/05/2023 1.0  0.5 - 1.4 mg/dL Final    Calcium 12/05/2023 9.5  8.7 - 10.5 mg/dL Final    Total Protein 12/05/2023 7.9  6.0 - 8.4 g/dL Final    Albumin 12/05/2023 3.8  3.5 - 5.2 g/dL Final    Total Bilirubin 12/05/2023 0.4  0.1 - 1.0 mg/dL Final    Alkaline Phosphatase 12/05/2023 86  55 - 135 U/L Final    AST 12/05/2023 18  10 - 40 U/L Final    ALT 12/05/2023 16  10 - 44 U/L Final    eGFR 12/05/2023 >60  >60 mL/min/1.73 m^2 Final    Anion Gap 12/05/2023 10  8 - 16 mmol/L Final     12/05/2023 6  0 - 30 U/mL Final       Past Medical History:   Diagnosis Date    Arthritis      Asthma     C. difficile colitis     Depression     Diabetes mellitus, type 2     Diabetic neuropathy     DVT (deep venous thrombosis)     Enteritis due to Norovirus 2019    Feeding difficulty in adult 2021    Formatting of this note might be different from the original. Added automatically from request for surgery [id number] Last Assessment & Plan: Formatting of this note might be different from the original. · Patient with history of ovarian cancer s/p MARCELO/BSO, omentectomy, radical tumor debulking, cytoreduction, appendectomy, partial hepatectomy, and HIPEC per Dr. Forrester and Dr. Jordan  with postope    Hyperlipidemia     Hypertension     Internal hemorrhoids     Pancreatitis 2018    Last Assessment & Plan: Formatting of this note might be different from the original. Mirna is a  58 year old female presenting with abdominal pain.  CT abd/pevis w/o contrast revealed acute pancreatitis involving pancreatic head with no fluid collections/necrosis.  EGD from 2018 unremarkable. Assessment: Today patient is sitting up and interactive.  No acute stress obvious.   Reports pain in    Pulmonary embolism     Respiratory failure with hypoxia 10/06/2021    Rheumatoid arthritis(714.0)     Right upper quadrant pain 2021    SOB (shortness of breath) 2023     Social History     Socioeconomic History    Marital status: Single   Tobacco Use    Smoking status: Former     Current packs/day: 0.00     Types: Cigarettes     Quit date: 2015     Years since quittin.7    Smokeless tobacco: Never    Tobacco comments:      PPD   Substance and Sexual Activity    Alcohol use: No     Comment: pt states quit drinking 1 mo 1/2 ago    Drug use: No    Sexual activity: Not Currently     Social Determinants of Health     Financial Resource Strain: Low Risk  (2023)    Overall Financial Resource Strain (CARDIA)     Difficulty of Paying Living Expenses: Not very hard   Food Insecurity: Food Insecurity  Present (12/4/2023)    Hunger Vital Sign     Worried About Running Out of Food in the Last Year: Sometimes true     Ran Out of Food in the Last Year: Sometimes true   Transportation Needs: No Transportation Needs (12/4/2023)    PRAPARE - Transportation     Lack of Transportation (Medical): No     Lack of Transportation (Non-Medical): No   Physical Activity: Insufficiently Active (12/4/2023)    Exercise Vital Sign     Days of Exercise per Week: 2 days     Minutes of Exercise per Session: 30 min   Stress: Stress Concern Present (12/4/2023)    [employer] - Occupational Stress Questionnaire     Feeling of Stress : Very much   Social Connections: Unknown (12/4/2023)    Social Connection and Isolation Panel [NHANES]     Frequency of Communication with Friends and Family: Twice a week     Frequency of Social Gatherings with Friends and Family: Once a week     Active Member of Clubs or Organizations: No     Attends Club or Organization Meetings: Patient declined     Marital Status: Never    Housing Stability: Low Risk  (12/4/2023)    Housing Stability Vital Sign     Unable to Pay for Housing in the Last Year: No     Number of Places Lived in the Last Year: 2     Unstable Housing in the Last Year: No     Past Surgical History:   Procedure Laterality Date    CARPAL TUNNEL RELEASE Left     CHOLECYSTECTOMY      KNEE ARTHROSCOPY      KNEE SURGERY      TUBAL LIGATION       Family History   Problem Relation Age of Onset    Heart disease Mother     Hypertension Mother     Diabetes Father     Stroke Father     Hypertension Father     Stroke Maternal Aunt     Stroke Maternal Aunt     Stroke Sister     Breast cancer Neg Hx     Colon cancer Neg Hx     Ovarian cancer Neg Hx        Review of patient's allergies indicates:   Allergen Reactions    Liraglutide Other (See Comments)     pancreatitis    Empagliflozin      Other reaction(s): yeast infxn    Glyburide      Other reaction(s): unknown     "Metformin      Other reaction(s): GI upset    Pioglitazone      Other reaction(s): Swelling    Iohexol Itching     Patient given 50mg benadryl im and itching subsided       Current Outpatient Medications:     aspirin 81 mg Cap, Take 81 mg by mouth once daily., Disp: , Rfl:     BD INSULIN PEN NEEDLE UF MINI 31 x 3/16 " Ndle, , Disp: , Rfl: 0    droNABinol (MARINOL) 2.5 MG capsule, Take 2.5 mg by mouth once daily., Disp: , Rfl:     ezetimibe (ZETIA) 10 mg tablet, Take 10 mg by mouth once daily., Disp: , Rfl:     furosemide (LASIX) 40 MG tablet, Take 40 mg by mouth 2 (two) times a day., Disp: , Rfl:     hydrOXYzine HCL (ATARAX) 10 MG Tab, Take 10 mg by mouth 3 (three) times daily as needed., Disp: , Rfl:     insulin aspart (NOVOLOG FLEXPEN) 100 unit/mL InPn, Inject 8 Units into the skin 3 (three) times daily with meals., Disp: 1 Box, Rfl: 6    insulin degludec (TRESIBA FLEXTOUCH U-100) 100 unit/mL (3 mL) insulin pen, Inject 32 Units into the skin., Disp: , Rfl:     lansoprazole (PREVACID) 30 MG capsule, Take 30 mg by mouth once daily., Disp: , Rfl:     mirtazapine (REMERON) 15 MG tablet, Take 15 mg by mouth every evening., Disp: , Rfl:     montelukast (SINGULAIR) 10 mg tablet, Take 1 tablet (10 mg total) by mouth every evening., Disp: 90 tablet, Rfl: 1    MOUNJARO 2.5 mg/0.5 mL PnIj, Inject 2.5 mg into the skin once a week., Disp: , Rfl:     ondansetron (ZOFRAN) 4 MG tablet, Take 1 tablet (4 mg total) by mouth every 8 (eight) hours as needed (Nausea and vomiting)., Disp: 12 tablet, Rfl: 0    oxyCODONE-acetaminophen (PERCOCET) 7.5-325 mg per tablet, Take 1 tablet by mouth every 6 (six) hours as needed for Pain., Disp: , Rfl:     rosuvastatin (CRESTOR) 40 MG Tab, Take 20 mg by mouth once daily., Disp: , Rfl:     sertraline (ZOLOFT) 100 MG tablet, Take by mouth., Disp: , Rfl:     sumatriptan (IMITREX) 50 MG tablet, Take by mouth., Disp: , Rfl:     SYMBICORT 160-4.5 mcg/actuation HFAA, Inhale 2 puffs into the lungs every " "12 (twelve) hours., Disp: , Rfl:     tiZANidine 4 mg Cap, Take 4 mg by mouth 2 (two) times daily as needed., Disp: , Rfl:     valsartan-hydrochlorothiazide (DIOVAN-HCT) 80-12.5 mg per tablet, Take 1 tablet by mouth once daily., Disp: , Rfl:     warfarin (COUMADIN) 7.5 MG tablet, Take 7.5 mg by mouth. Mon, Tue, Thurs, Fri, Sat, Sun. (Daily EXCEPT Wednesday), Disp: , Rfl:     ZEJULA 100 mg Tab, Take 1 tablet by mouth every morning., Disp: , Rfl:     morphine (MS CONTIN) 15 MG 12 hr tablet, Take 1 tablet (15 mg total) by mouth 2 (two) times daily., Disp: 60 tablet, Rfl: 0    Review of Systems   Constitutional:  Positive for activity change, appetite change and fatigue. Negative for chills, fever and unexpected weight change.   HENT:  Negative for nasal congestion, ear pain, postnasal drip, rhinorrhea, sore throat and trouble swallowing.    Eyes:  Negative for discharge and visual disturbance.   Respiratory:  Positive for shortness of breath. Negative for apnea, cough and wheezing.    Cardiovascular:  Negative for chest pain, palpitations and leg swelling.   Gastrointestinal:  Positive for abdominal pain and reflux. Negative for blood in stool, constipation, diarrhea, nausea and vomiting.   Genitourinary:  Negative for bladder incontinence, dysuria, frequency and urgency.   Musculoskeletal:  Positive for arthralgias, back pain and myalgias. Negative for gait problem and leg pain.   Integumentary:  Positive for breast mass. Negative for rash and mole/lesion.   Neurological:  Positive for dizziness, weakness and headaches. Negative for tremors, light-headedness and numbness.   Hematological:  Bruises/bleeds easily.   Psychiatric/Behavioral:  Positive for sleep disturbance. Negative for dysphoric mood and suicidal ideas. The patient is nervous/anxious.    Breast: Positive for mass.          Objective:      Vitals:    12/13/23 1111   BP: 130/82   Pulse: (!) 55   SpO2: 99%   Weight: 108 kg (238 lb)   Height: 5' 7" (1.702 m) "     Physical Exam  Vitals and nursing note reviewed.   Constitutional:       General: She is not in acute distress.     Appearance: Normal appearance. She is well-developed and well-groomed. She is obese. She is not ill-appearing, toxic-appearing or diaphoretic.   HENT:      Head: Normocephalic and atraumatic.      Right Ear: External ear normal.      Left Ear: External ear normal.      Nose: Nose normal. No rhinorrhea.      Mouth/Throat:      Mouth: Mucous membranes are moist.      Pharynx: Oropharynx is clear.   Eyes:      General: No scleral icterus.     Pupils: Pupils are equal, round, and reactive to light.   Neck:      Thyroid: No thyromegaly.      Vascular: No carotid bruit or JVD.   Cardiovascular:      Rate and Rhythm: Regular rhythm. Bradycardia present. No extrasystoles are present.     Pulses: Normal pulses.           Radial pulses are 2+ on the right side and 2+ on the left side.      Heart sounds: Normal heart sounds. No murmur heard.  Pulmonary:      Effort: Pulmonary effort is normal.      Breath sounds: Normal breath sounds. No wheezing or rales.   Abdominal:      General: Bowel sounds are normal. There is no distension.      Palpations: Abdomen is soft.      Tenderness: There is no abdominal tenderness.   Musculoskeletal:         General: No tenderness or deformity. Normal range of motion.      Cervical back: Normal range of motion and neck supple.      Lumbar back: Normal. No spasms.      Right lower leg: No edema.      Left lower leg: No edema.      Comments: Crepitus to bilateral knees   Skin:     General: Skin is warm and dry.      Capillary Refill: Capillary refill takes less than 2 seconds.      Coloration: Skin is not jaundiced or pale.      Findings: No rash.   Neurological:      General: No focal deficit present.      Mental Status: She is alert and oriented to person, place, and time.      Cranial Nerves: No cranial nerve deficit, dysarthria or facial asymmetry.      Sensory: No sensory  deficit.      Motor: Weakness present. No tremor.      Coordination: Coordination normal.      Gait: Gait abnormal.      Comments: Chronic pain, weakness to bilateral knees   Psychiatric:         Attention and Perception: Attention and perception normal.         Mood and Affect: Mood normal.         Speech: Speech normal.         Behavior: Behavior normal. Behavior is cooperative.         Thought Content: Thought content normal. Thought content does not include homicidal or suicidal ideation.         Cognition and Memory: Cognition normal.         Judgment: Judgment normal.      Comments: Pleasant and cooperative. Concerned about her health, and asks appropriate questions and for advice.           Assessment:       1. Encounter for medical examination to establish care    2. Fallopian tube cancer, carcinoma, unspecified laterality    3. Chronic deep vein thrombosis (DVT) of proximal vein of both lower extremities    4. Uncontrolled type 2 diabetes mellitus with hyperglycemia    5. Hypertension, unspecified type    6. Abnormal findings on diagnostic imaging of breast    7. Epigastric pain    8. Abdominal pain, unspecified abdominal location    9. Patellofemoral disorder of left knee    10. Need for prophylactic vaccination and inoculation against influenza    11. Spinal stenosis, lumbar region, without neurogenic claudication    12. Carpal tunnel syndrome on both sides    13. Migraine with aura and without status migrainosus, not intractable    14. COPD without exacerbation    15. Combined hyperlipidemia associated with type 2 diabetes mellitus    16. Rheumatoid arthritis involving multiple sites, unspecified whether rheumatoid factor present    17. Warfarin anticoagulation    18. Long term (current) use of anticoagulants    19. Chronic deep vein thrombosis (DVT) of distal vein of left lower extremity    20. Hypercoagulable state    21. Anxiety with depression    22. Insomnia due to medical condition         Plan:        Encounter for medical examination to establish care  See HPI    Fallopian tube cancer, carcinoma, unspecified laterality  Patient has been seeing oncologist in [facility] and [place]. Would like to begin care with someone more local   Has recently had an abnormal screening mammogram. Advised diagnostic and patient agrees.  -     Ambulatory referral/consult to Gynecologic Oncology; Future; Expected date: 12/20/2023  -     Mammo Digital Diagnostic Bilat with [facility]; Future; Expected date: 12/13/2023  -     US Breast Bilateral Limited; Future; Expected date: 12/13/2023    Uncontrolled type 2 diabetes mellitus with hyperglycemia  Better control than more recently. Patient wants referral to endocrine. Advised eye exam.   -     Ambulatory referral/consult to Ophthalmology; Future; Expected date: 12/20/2023  -     Ambulatory referral/consult to Endocrinology; Future; Expected date: 12/20/2023  -     Hemoglobin A1C; Future; Expected date: 12/13/2023    Hypertension, unspecified type  Well controlled at this time. Continue medications as is. Labs for evaluation.  -     TSH; Future; Expected date: 12/13/2023  -     T4, Free; Future; Expected date: 12/13/2023    Abnormal findings on diagnostic imaging of breast  Advised diagnostic testing in high risk patient. Agrees with plan  -     Mammo Digital Diagnostic Bilat with [facility]; Future; Expected date: 12/13/2023  -     US Breast Bilateral Limited; Future; Expected date: 12/13/2023    Epigastric pain  Abdominal pain, unspecified abdominal location  Reports generalized abdominal discomfort and bloating, gas. Would like to be evaluated by GI due to her history of cancer and naturally her anxiety about mets/recurrence.  -     Ambulatory referral/consult to Gastroenterology; Future; Expected date: 12/20/2023      Patellofemoral disorder of left knee  Spinal stenosis, lumbar region, without neurogenic claudication  Carpal tunnel syndrome on both sides  Patient voiced that she does not  want surgery at this time. Discussed options for referrals, different treatment modalities. Patient agrees with referral to physical med.  -     Ambulatory referral/consult to Physical Medicine Rehab; Future; Expected date: 12/20/2023    Need for prophylactic vaccination and inoculation against influenza  Ok for and advised that patient get her flu vaccine  -     Influenza (FLUAD) - Quadrivalent (Adjuvanted) *Preferred* (65+) (PF)    Migraine with aura and without status migrainosus, not intractable  Continue medication regimen as is for now    COPD without exacerbation  Asymptomatic at this time    Combined hyperlipidemia associated with type 2 diabetes mellitus  On dual therapy    Rheumatoid arthritis involving multiple sites, unspecified whether rheumatoid factor present  Chronic pain managed by oncology at this point    Warfarin anticoagulation  Long term (current) use of anticoagulants  Chronic deep vein thrombosis (DVT) of distal vein of left lower extremity  Hypercoagulable state  Chronic deep vein thrombosis (DVT) of proximal vein of both lower extremities  -     Ambulatory referral/consult to Hematology / Oncology; Future; Expected date: 12/20/2023    Anxiety with depression  Continue zoloft    Insomnia due to medical condition  Continue remeron      Follow up in about 3 months (around 3/13/2024) for Diabetic Check Up, HLD, HTN.        1/1/2024 Hazel Veras

## 2024-01-02 ENCOUNTER — OFFICE VISIT (OUTPATIENT)
Dept: ORTHOPEDICS | Facility: CLINIC | Age: 64
End: 2024-01-02
Payer: MEDICARE

## 2024-01-02 ENCOUNTER — HOSPITAL ENCOUNTER (OUTPATIENT)
Dept: RADIOLOGY | Facility: HOSPITAL | Age: 64
Discharge: HOME | End: 2024-01-02
Attending: ORTHOPAEDIC SURGERY
Payer: MEDICARE

## 2024-01-02 VITALS — BODY MASS INDEX: 37.51 KG/M2 | WEIGHT: 239 LBS | HEIGHT: 67 IN

## 2024-01-02 DIAGNOSIS — M25.562 PAIN IN BOTH KNEES, UNSPECIFIED CHRONICITY: ICD-10-CM

## 2024-01-02 DIAGNOSIS — M25.561 PAIN IN BOTH KNEES, UNSPECIFIED CHRONICITY: ICD-10-CM

## 2024-01-02 DIAGNOSIS — M17.0 OSTEOARTHRITIS OF BOTH KNEES, UNSPECIFIED OSTEOARTHRITIS TYPE: ICD-10-CM

## 2024-01-02 PROCEDURE — 73564 X-RAY EXAM KNEE 4 OR MORE: CPT | Mod: TC,50,PO

## 2024-01-02 PROCEDURE — 3008F BODY MASS INDEX DOCD: CPT | Mod: CPTII,S$GLB,, | Performed by: ORTHOPAEDIC SURGERY

## 2024-01-02 PROCEDURE — 99204 OFFICE O/P NEW MOD 45 MIN: CPT | Mod: S$GLB,,, | Performed by: ORTHOPAEDIC SURGERY

## 2024-01-02 PROCEDURE — 99999 PR PBB SHADOW E&M-EST. PATIENT-LVL II: CPT | Mod: PBBFAC,,, | Performed by: ORTHOPAEDIC SURGERY

## 2024-01-02 PROCEDURE — 73564 X-RAY EXAM KNEE 4 OR MORE: CPT | Mod: 26,50,, | Performed by: RADIOLOGY

## 2024-01-02 PROCEDURE — 1125F AMNT PAIN NOTED PAIN PRSNT: CPT | Mod: CPTII,S$GLB,, | Performed by: ORTHOPAEDIC SURGERY

## 2024-01-02 NOTE — PROGRESS NOTES
Patient ID: Mirna Quinteros is a 63 y.o. female    Chief Complaint:   Chief Complaint   Patient presents with    Right Knee - Pain    Left Knee - Pain       History of Present Illness:    Pleasant 63-year-old female here for evaluation of bilateral knee pain.  Reports chronic bilateral knee pain in the past that is worsening.  She has known arthritis of the bilateral knees.  she has had steroid injections in the past as well as viscosupplementation.  No positive response to either.  No recent physical therapy.  She does have diabetes, this is fairly well-controlled last A1c less than 8.  She also has ovarian cancer being treated by Dr. hiram mix with chemotherapy pills.    PAST MEDICAL HISTORY:   Past Medical History:   Diagnosis Date    Arthritis     Asthma     C. difficile colitis     Depression     Diabetes mellitus, type 2     Diabetic neuropathy     DVT (deep venous thrombosis)     Enteritis due to Norovirus 02/22/2019    Feeding difficulty in adult 11/22/2021    Formatting of this note might be different from the original. Added automatically from request for surgery [id number] Last Assessment & Plan: Formatting of this note might be different from the original. · Patient with history of ovarian cancer s/p MARCELO/BSO, omentectomy, radical tumor debulking, cytoreduction, appendectomy, partial hepatectomy, and HIPEC per Dr. Forrester and Dr. Jordan 12/13 with postope    Hyperlipidemia     Hypertension     Internal hemorrhoids     Pancreatitis 09/23/2018    Last Assessment & Plan: Formatting of this note might be different from the original. Mirna is a  58 year old female presenting with abdominal pain.  CT abd/pevis w/o contrast revealed acute pancreatitis involving pancreatic head with no fluid collections/necrosis.  EGD from 9/24/2018 unremarkable. Assessment: Today patient is sitting up and interactive.  No acute stress obvious.   Reports pain in    Pulmonary embolism     Respiratory failure with hypoxia 10/06/2021     "Rheumatoid arthritis(714.0)     Right upper quadrant pain 2021    SOB (shortness of breath) 2023     PAST SURGICAL HISTORY:   Past Surgical History:   Procedure Laterality Date    CARPAL TUNNEL RELEASE Left     CHOLECYSTECTOMY      KNEE ARTHROSCOPY      KNEE SURGERY      TUBAL LIGATION       FAMILY HISTORY:   Family History   Problem Relation Age of Onset    Heart disease Mother     Hypertension Mother     Diabetes Father     Stroke Father     Hypertension Father     Stroke Maternal Aunt     Stroke Maternal Aunt     Stroke Sister     Breast cancer Neg Hx     Colon cancer Neg Hx     Ovarian cancer Neg Hx      SOCIAL HISTORY:   Social History     Occupational History    Not on file   Tobacco Use    Smoking status: Former     Current packs/day: 0.00     Types: Cigarettes     Quit date: 2015     Years since quittin.7    Smokeless tobacco: Never    Tobacco comments:      PPD   Substance and Sexual Activity    Alcohol use: No     Comment: pt states quit drinking 1 mo /2 ago    Drug use: No    Sexual activity: Not Currently        MEDICATIONS:   Current Outpatient Medications:     aspirin 81 mg Cap, Take 81 mg by mouth once daily., Disp: , Rfl:     BD INSULIN PEN NEEDLE UF MINI 31 x 3/16 " Ndle, , Disp: , Rfl: 0    droNABinol (MARINOL) 2.5 MG capsule, Take 2.5 mg by mouth once daily., Disp: , Rfl:     ezetimibe (ZETIA) 10 mg tablet, Take 10 mg by mouth once daily., Disp: , Rfl:     furosemide (LASIX) 40 MG tablet, Take 40 mg by mouth 2 (two) times a day., Disp: , Rfl:     hydrOXYzine HCL (ATARAX) 10 MG Tab, Take 10 mg by mouth 3 (three) times daily as needed., Disp: , Rfl:     insulin aspart (NOVOLOG FLEXPEN) 100 unit/mL InPn, Inject 8 Units into the skin 3 (three) times daily with meals., Disp: 1 Box, Rfl: 6    insulin degludec (TRESIBA FLEXTOUCH U-100) 100 unit/mL (3 mL) insulin pen, Inject 32 Units into the skin., Disp: , Rfl:     lansoprazole (PREVACID) 30 MG capsule, Take 30 mg by mouth once " daily., Disp: , Rfl:     mirtazapine (REMERON) 15 MG tablet, Take 15 mg by mouth every evening., Disp: , Rfl:     montelukast (SINGULAIR) 10 mg tablet, Take 1 tablet (10 mg total) by mouth every evening., Disp: 90 tablet, Rfl: 1    morphine (MS CONTIN) 15 MG 12 hr tablet, Take 1 tablet (15 mg total) by mouth 2 (two) times daily., Disp: 60 tablet, Rfl: 0    MOUNJARO 2.5 mg/0.5 mL PnIj, Inject 2.5 mg into the skin once a week., Disp: , Rfl:     ondansetron (ZOFRAN) 4 MG tablet, Take 1 tablet (4 mg total) by mouth every 8 (eight) hours as needed (Nausea and vomiting)., Disp: 12 tablet, Rfl: 0    oxyCODONE-acetaminophen (PERCOCET) 7.5-325 mg per tablet, Take 1 tablet by mouth every 6 (six) hours as needed for Pain., Disp: , Rfl:     rosuvastatin (CRESTOR) 40 MG Tab, Take 20 mg by mouth once daily., Disp: , Rfl:     sertraline (ZOLOFT) 100 MG tablet, Take by mouth., Disp: , Rfl:     sumatriptan (IMITREX) 50 MG tablet, Take by mouth., Disp: , Rfl:     SYMBICORT 160-4.5 mcg/actuation HFAA, Inhale 2 puffs into the lungs every 12 (twelve) hours., Disp: , Rfl:     tiZANidine 4 mg Cap, Take 4 mg by mouth 2 (two) times daily as needed., Disp: , Rfl:     valsartan-hydrochlorothiazide (DIOVAN-HCT) 80-12.5 mg per tablet, Take 1 tablet by mouth once daily., Disp: , Rfl:     warfarin (COUMADIN) 7.5 MG tablet, Take 7.5 mg by mouth. Mon, Tue, Thurs, Fri, Sat, Sun. (Daily EXCEPT Wednesday), Disp: , Rfl:     ZEJULA 100 mg Tab, Take 1 tablet by mouth every morning., Disp: , Rfl:   ALLERGIES:   Review of patient's allergies indicates:   Allergen Reactions    Liraglutide Other (See Comments)     pancreatitis    Empagliflozin      Other reaction(s): yeast infxn    Glyburide      Other reaction(s): unknown    Metformin      Other reaction(s): GI upset    Pioglitazone      Other reaction(s): Swelling    Iohexol Itching     Patient given 50mg benadryl im and itching subsided         Physical Exam     There were no vitals filed for this  visit.  Alert and oriented to person, place and time. No acute distress. Well-groomed, not ill appearing. Pupils round and reactive, normal respiratory effort, no audible wheezing.     GENERAL:  A well-developed, well-nourished 63 y.o. female who is alert and       oriented in no acute distress.      Gait: She  walks with a antalgic gait.                   EXTREMITIES:  Examination of lower extremities reveals there is no visible mass or deformity.    Left knee:  ROM 5-125    Ligamentously stable to varus/valgus stress.    Anterior and posterior drawers negative.    No pain over pes bursa.    No warmth    No erythema     Effusion Yes    medial, lateral joint line tenderness    Positive Patellofemoral grind/crepitus     Right knee:  ROM 5-125    Ligamentously stable to varus/valgus stress.    Anterior and posterior drawers negative.    No pain over pes bursa.    No warmth    No erythema    Effusion Yes    medial, lateral joint line tenderness    Positive Patellofemoral grind/crepitus     The skin over both lower extremities is normal and unremarkable.  She has a  painless range of motion of the hips and ankles bilaterally.   Sensation is intact in both lower extremities.    There are no motor deficits in the lower extremities bilaterally.   Pedal pulses are palpable distally bilaterally.    She has no calf tenderness to palpation nor edema.       Imaging:       X-Ray: I have reviewed all pertinent results/findings and my personal findings are:  Severe bilateral knee tricompartmental DJD with osteophytes and subchondral sclerosis      Assessment & Plan    Osteoarthritis of both knees, unspecified osteoarthritis type  -     Ambulatory referral/consult to Orthopedics         Treatment options discussed with her in detail.  I went over her x-rays which show severe bilateral DJD of the knees.  Ultimately she has failed conservative treatment with injections including steroid and viscosupplementation.  Based on her age and  severity of knee arthritis I do think she eventually will require TKA.  At this point she has diabetes and currently on chemotherapy regimen for ovarian cancer.  I think this puts her at a increased risk of perioperative complications at this point.  Recommendation is to see if she had be a candidate for cryoablation.  We will place referral to Dr. Quinonez.

## 2024-01-03 ENCOUNTER — ANTI-COAG VISIT (OUTPATIENT)
Dept: CARDIOLOGY | Facility: CLINIC | Age: 64
End: 2024-01-03
Payer: MEDICARE

## 2024-01-03 ENCOUNTER — LAB VISIT (OUTPATIENT)
Dept: LAB | Facility: HOSPITAL | Age: 64
End: 2024-01-03
Attending: INTERNAL MEDICINE
Payer: MEDICARE

## 2024-01-03 DIAGNOSIS — I82.90 VTE (VENOUS THROMBOEMBOLISM): ICD-10-CM

## 2024-01-03 DIAGNOSIS — Z79.01 LONG TERM (CURRENT) USE OF ANTICOAGULANTS: ICD-10-CM

## 2024-01-03 DIAGNOSIS — Z79.01 WARFARIN ANTICOAGULATION: ICD-10-CM

## 2024-01-03 DIAGNOSIS — I82.5Z2 CHRONIC DEEP VEIN THROMBOSIS (DVT) OF DISTAL VEIN OF LEFT LOWER EXTREMITY: ICD-10-CM

## 2024-01-03 DIAGNOSIS — Z79.01 WARFARIN ANTICOAGULATION: Primary | ICD-10-CM

## 2024-01-03 LAB
INR PPP: 1.9 (ref 0.8–1.2)
PROTHROMBIN TIME: 19.9 SEC (ref 9–12.5)

## 2024-01-03 PROCEDURE — 85610 PROTHROMBIN TIME: CPT | Performed by: INTERNAL MEDICINE

## 2024-01-03 PROCEDURE — 36415 COLL VENOUS BLD VENIPUNCTURE: CPT | Performed by: INTERNAL MEDICINE

## 2024-01-03 PROCEDURE — 93793 ANTICOAG MGMT PT WARFARIN: CPT | Mod: S$GLB,,,

## 2024-01-03 NOTE — PROGRESS NOTES
[facility]    2024 3:06 PM    Assessment/Plan:    Patient presents today with slightly subtherapeutic  INR.    Recommendation for patient's warfarin regimen: Boost dose today to 3.75mg then resume current maintenance dose    Recommend repeat INR in 1 week  _________________________________________________________________    Mirna ROE Quinteros (63 y.o.) is followed by the [facility].    Anticoagulation Summary  As of 1/3/2024      INR goal:  2.0-2.5   TTR:  0.0 % (2.7 wk)   INR used for dosin.9 (1/3/2024)   Warfarin maintenance plan:  0 mg every Wed; 7.5 mg (7.5 mg x 1) all other days   Weekly warfarin total:  45 mg   Plan last modified:  Evon Sarmiento, PharmD (2023)   Next INR check:  2024   Target end date:      Indications    Warfarin anticoagulation [Z79.01]  Long term (current) use of anticoagulants [Z79.01]  Chronic deep vein thrombosis (DVT) of distal vein of left lower extremity [I82.5Z2]                 Anticoagulation Episode Summary       INR check location:      Preferred lab:      Send INR reminders to:  ANTOINE CABRERA PROVIDER    Comments:  [facility] [place] Phone   [phone] Fax   [phone]          Anticoagulation Care Providers       Provider Role Specialty Phone number    Emil Burgess MD [facility] Medical Oncology [phone]

## 2024-01-08 ENCOUNTER — HOSPITAL ENCOUNTER (OUTPATIENT)
Dept: RADIOLOGY | Facility: HOSPITAL | Age: 64
Discharge: HOME | End: 2024-01-08
Payer: MEDICARE

## 2024-01-08 DIAGNOSIS — R92.8 ABNORMAL FINDINGS ON DIAGNOSTIC IMAGING OF BREAST: ICD-10-CM

## 2024-01-08 DIAGNOSIS — C57.00 FALLOPIAN TUBE CANCER, CARCINOMA, UNSPECIFIED LATERALITY: ICD-10-CM

## 2024-01-08 PROCEDURE — 77061 BREAST TOMOSYNTHESIS UNI: CPT | Mod: 26,RT,, | Performed by: RADIOLOGY

## 2024-01-08 PROCEDURE — 77065 DX MAMMO INCL CAD UNI: CPT | Mod: TC,RT

## 2024-01-08 PROCEDURE — 77061 BREAST TOMOSYNTHESIS UNI: CPT | Mod: TC,RT

## 2024-01-08 PROCEDURE — 77065 DX MAMMO INCL CAD UNI: CPT | Mod: 26,RT,, | Performed by: RADIOLOGY

## 2024-01-08 NOTE — PROGRESS NOTES
Do not have to contact patient. Dr. Langley already discussed findings with patient and has referred for biopsy.

## 2024-01-11 ENCOUNTER — LAB VISIT (OUTPATIENT)
Dept: LAB | Facility: HOSPITAL | Age: 64
End: 2024-01-11
Attending: OBSTETRICS & GYNECOLOGY
Payer: MEDICARE

## 2024-01-11 ENCOUNTER — ANTI-COAG VISIT (OUTPATIENT)
Dept: CARDIOLOGY | Facility: CLINIC | Age: 64
End: 2024-01-11
Payer: MEDICARE

## 2024-01-11 DIAGNOSIS — I82.5Z2 CHRONIC DEEP VEIN THROMBOSIS (DVT) OF DISTAL VEIN OF LEFT LOWER EXTREMITY: ICD-10-CM

## 2024-01-11 DIAGNOSIS — Z79.01 LONG TERM (CURRENT) USE OF ANTICOAGULANTS: ICD-10-CM

## 2024-01-11 DIAGNOSIS — Z79.01 WARFARIN ANTICOAGULATION: Primary | ICD-10-CM

## 2024-01-11 DIAGNOSIS — Z79.01 WARFARIN ANTICOAGULATION: ICD-10-CM

## 2024-01-11 DIAGNOSIS — I82.90 VTE (VENOUS THROMBOEMBOLISM): ICD-10-CM

## 2024-01-11 DIAGNOSIS — C57.00 FALLOPIAN TUBE CANCER, CARCINOMA, UNSPECIFIED LATERALITY: ICD-10-CM

## 2024-01-11 LAB
ALBUMIN SERPL BCP-MCNC: 4.1 G/DL (ref 3.5–5.2)
ALP SERPL-CCNC: 75 U/L (ref 55–135)
ALT SERPL W/O P-5'-P-CCNC: 12 U/L (ref 10–44)
ANION GAP SERPL CALC-SCNC: 7 MMOL/L (ref 8–16)
AST SERPL-CCNC: 14 U/L (ref 10–40)
BILIRUB SERPL-MCNC: 0.5 MG/DL (ref 0.1–1)
BUN SERPL-MCNC: 12 MG/DL (ref 8–23)
CALCIUM SERPL-MCNC: 9 MG/DL (ref 8.7–10.5)
CHLORIDE SERPL-SCNC: 106 MMOL/L (ref 95–110)
CO2 SERPL-SCNC: 27 MMOL/L (ref 23–29)
CREAT SERPL-MCNC: 0.8 MG/DL (ref 0.5–1.4)
ERYTHROCYTE [DISTWIDTH] IN BLOOD BY AUTOMATED COUNT: 15.2 % (ref 11.5–14.5)
EST. GFR  (NO RACE VARIABLE): >60 ML/MIN/1.73 M^2
GLUCOSE SERPL-MCNC: 149 MG/DL (ref 70–110)
HCT VFR BLD AUTO: 34.4 % (ref 37–48.5)
HGB BLD-MCNC: 11.7 G/DL (ref 12–16)
IMM GRANULOCYTES # BLD AUTO: 0 K/UL (ref 0–0.04)
INR PPP: 1.8 (ref 0.8–1.2)
MCH RBC QN AUTO: 30.2 PG (ref 27–31)
MCHC RBC AUTO-ENTMCNC: 34 G/DL (ref 32–36)
MCV RBC AUTO: 89 FL (ref 82–98)
NEUTROPHILS # BLD AUTO: 2.3 K/UL (ref 1.8–7.7)
PLATELET # BLD AUTO: 202 K/UL (ref 150–450)
PMV BLD AUTO: 8.8 FL (ref 9.2–12.9)
POTASSIUM SERPL-SCNC: 4 MMOL/L (ref 3.5–5.1)
PROT SERPL-MCNC: 7.6 G/DL (ref 6–8.4)
PROTHROMBIN TIME: 19.4 SEC (ref 9–12.5)
RBC # BLD AUTO: 3.87 M/UL (ref 4–5.4)
SODIUM SERPL-SCNC: 140 MMOL/L (ref 136–145)
WBC # BLD AUTO: 4.07 K/UL (ref 3.9–12.7)

## 2024-01-11 PROCEDURE — 85610 PROTHROMBIN TIME: CPT | Performed by: INTERNAL MEDICINE

## 2024-01-11 PROCEDURE — 93793 ANTICOAG MGMT PT WARFARIN: CPT | Mod: S$GLB,,,

## 2024-01-11 PROCEDURE — 85027 COMPLETE CBC AUTOMATED: CPT | Performed by: OBSTETRICS & GYNECOLOGY

## 2024-01-11 PROCEDURE — 86304 IMMUNOASSAY TUMOR CA 125: CPT | Performed by: OBSTETRICS & GYNECOLOGY

## 2024-01-11 PROCEDURE — 80053 COMPREHEN METABOLIC PANEL: CPT | Performed by: OBSTETRICS & GYNECOLOGY

## 2024-01-11 PROCEDURE — 36415 COLL VENOUS BLD VENIPUNCTURE: CPT | Performed by: INTERNAL MEDICINE

## 2024-01-11 NOTE — PROGRESS NOTES
[facility]    2024 2:25 PM    Assessment/Plan:    Patient presents today with subtherapeutic  INR.    Assessment of patient findings and chart review: Pt continues to be subtherapeutic after re-challenging dose.     Recommendation for patient's warfarin regimen: Boost dose today to 11.25mg then increase maintenance dose    Recommend repeat INR in 1 week  _________________________________________________________________    Mirna Quinteros (63 y.o.) is followed by the [facility].    Anticoagulation Summary  As of 2024      INR goal:  2.0-2.5   TTR:  0.0 % (3.9 wk)   INR used for dosin.8 (2024)   Warfarin maintenance plan:  3.75 mg (7.5 mg x 0.5) every Wed; 7.5 mg (7.5 mg x 1) all other days   Weekly warfarin total:  48.75 mg   Plan last modified:  Lisa Gold, PharmD (2024)   Next INR check:     Target end date:      Indications    Warfarin anticoagulation [Z79.01]  Long term (current) use of anticoagulants [Z79.01]  Chronic deep vein thrombosis (DVT) of distal vein of left lower extremity [I82.5Z2]                 Anticoagulation Episode Summary       INR check location:      Preferred lab:      Send INR reminders to:  ANTOINE CABRERA PROVIDER    Comments:  [facility] [place] Phone   [phone] Fax   [phone]          Anticoagulation Care Providers       Provider Role Specialty Phone number    Emil Burgess MD [facility] Medical Oncology [phone]

## 2024-01-12 ENCOUNTER — OFFICE VISIT (OUTPATIENT)
Dept: ORTHOPEDICS | Facility: CLINIC | Age: 64
End: 2024-01-12
Payer: MEDICARE

## 2024-01-12 ENCOUNTER — TELEPHONE (OUTPATIENT)
Dept: ORTHOPEDICS | Facility: CLINIC | Age: 64
End: 2024-01-12

## 2024-01-12 VITALS — HEIGHT: 67 IN | BODY MASS INDEX: 37.51 KG/M2 | WEIGHT: 239 LBS

## 2024-01-12 DIAGNOSIS — G89.29 CHRONIC PAIN OF BOTH KNEES: ICD-10-CM

## 2024-01-12 DIAGNOSIS — M25.561 CHRONIC PAIN OF BOTH KNEES: ICD-10-CM

## 2024-01-12 DIAGNOSIS — M25.562 CHRONIC PAIN OF BOTH KNEES: ICD-10-CM

## 2024-01-12 DIAGNOSIS — M17.0 OSTEOARTHRITIS OF BOTH KNEES, UNSPECIFIED OSTEOARTHRITIS TYPE: Primary | ICD-10-CM

## 2024-01-12 LAB — CANCER AG125 SERPL-ACNC: 4.8 U/ML (ref 0–38.1)

## 2024-01-12 PROCEDURE — 1159F MED LIST DOCD IN RCRD: CPT | Mod: CPTII,S$GLB,, | Performed by: FAMILY MEDICINE

## 2024-01-12 PROCEDURE — 3288F FALL RISK ASSESSMENT DOCD: CPT | Mod: CPTII,S$GLB,, | Performed by: FAMILY MEDICINE

## 2024-01-12 PROCEDURE — 1101F PT FALLS ASSESS-DOCD LE1/YR: CPT | Mod: CPTII,S$GLB,, | Performed by: FAMILY MEDICINE

## 2024-01-12 PROCEDURE — 1125F AMNT PAIN NOTED PAIN PRSNT: CPT | Mod: CPTII,S$GLB,, | Performed by: FAMILY MEDICINE

## 2024-01-12 PROCEDURE — 99999 PR PBB SHADOW E&M-EST. PATIENT-LVL III: CPT | Mod: PBBFAC,,, | Performed by: FAMILY MEDICINE

## 2024-01-12 PROCEDURE — 3008F BODY MASS INDEX DOCD: CPT | Mod: CPTII,S$GLB,, | Performed by: FAMILY MEDICINE

## 2024-01-12 PROCEDURE — 99214 OFFICE O/P EST MOD 30 MIN: CPT | Mod: S$GLB,,, | Performed by: FAMILY MEDICINE

## 2024-01-12 NOTE — TELEPHONE ENCOUNTER
Called patient to schedule her Left Knee IOVERA for the 19th but she didn't answer the phone and there was no way to leave a voicemail.

## 2024-01-12 NOTE — PROGRESS NOTES
Subjective:     Patient ID: Mirna Quinteros is a 63 y.o. female.    Chief Complaint: Pain of the Right Knee (Bilat Knee pain/10/10 pain today/Discuss Iovera on both knees) and Pain of the Left Knee (Bilat Knee pain/10/10 pain today/Discuss Iovera on both knees/)    63-year-old female here today with complaints of bilateral knee pain secondary to osteoarthritis.  Is a chronic issue that she has been dealing with for years.  She has been treated with a combination of steroid injections as well as viscosupplementation which have not provided any relief.  Most recently she saw Dr. Morgan for this a few weeks ago.  He discussed the possibility of total knee arthroplasty for both her knees.  She is actively on chemotherapy right now and would not be a good surgical candidate.  She is here today to discuss the possibility of Iovera cryoablation for her genicular nerves.    Pain  Pertinent negatives include no chest pain, chills, congestion, coughing, headaches, rash or sore throat.       Review of Systems   Constitutional: Negative for chills and decreased appetite.   HENT:  Negative for congestion and sore throat.    Eyes:  Negative for blurred vision.   Cardiovascular:  Negative for chest pain, dyspnea on exertion and palpitations.   Respiratory:  Negative for cough and shortness of breath.    Skin:  Negative for rash.   Neurological:  Negative for difficulty with concentration, disturbances in coordination and headaches.   Psychiatric/Behavioral:  Negative for altered mental status, depression, hallucinations, memory loss and suicidal ideas.        Objective:       General    Nursing note and vitals reviewed.  Constitutional: She is oriented to person, place, and time. She appears well-developed and well-nourished.   HENT:   Nose: Nose normal.   Eyes: EOM are normal. Pupils are equal, round, and reactive to light.   Neck: Neck supple.   Cardiovascular:  Normal rate.            Pulmonary/Chest: Effort normal.    Abdominal: Soft.   Neurological: She is alert and oriented to person, place, and time. She has normal reflexes.   Psychiatric: She has a normal mood and affect. Her behavior is normal. Judgment and thought content normal.           Right Knee Exam     Inspection   Effusion: present    Tenderness   The patient is tender to palpation of the medial joint line.    Crepitus   The patient has crepitus of the patella and medial joint line.    Range of Motion   Extension:  50   Flexion:  140     Tests   Meniscus   David:  Medial - negative Lateral - negative  Ligament Examination   Lachman: normal (-1 to 2mm)   PCL-Posterior Drawer: normal (0 to 2mm)     MCL - Valgus: normal (0 to 2mm)  LCL - Varus: normal  Patella   Patellar apprehension: negative  Passive Patellar Tilt: neutral  Patellar Tracking: normal    Other   Popliteal (Baker's) Cyst: present  Sensation: normal    Left Knee Exam     Inspection   Effusion: present    Tenderness   The patient tender to palpation of the medial joint line.    Crepitus   The patient has crepitus of the patella and medial joint line.    Range of Motion   Extension:  50   Flexion:  140     Tests   Meniscus   David:  Medial - negative Lateral - negative  Stability   Lachman: normal (-1 to 2mm)   PCL-Posterior Drawer: normal (0 to 2mm)  MCL - Valgus: normal (0 to 2mm)  LCL - Varus: normal (0 to 2mm)  Patella   Patellar apprehension: negative  Passive Patellar Tilt: neutral  Patellar Tracking: normal    Other   Popliteal (Baker's) Cyst: present  Sensation: normal    Muscle Strength   Right Lower Extremity   Quadriceps:  5/5   Hamstrin/5   Left Lower Extremity   Quadriceps:  5/5   Hamstrin/5     Vascular Exam     Right Pulses  Dorsalis Pedis:      2+          Left Pulses  Dorsalis Pedis:      2+            Physical Exam  Vitals and nursing note reviewed.   Constitutional:       Appearance: She is well-developed and well-nourished.   HENT:      Nose: Nose normal.   Eyes:       Extraocular Movements: EOM normal.      Pupils: Pupils are equal, round, and reactive to light.   Cardiovascular:      Rate and Rhythm: Normal rate.      Pulses:           Dorsalis pedis pulses are 2+ on the right side and 2+ on the left side.   Pulmonary:      Effort: Pulmonary effort is normal.   Abdominal:      Palpations: Abdomen is soft.   Musculoskeletal:      Cervical back: Normal range of motion and neck supple.      Right knee: Effusion present.      Instability Tests: Medial David test negative and lateral David test negative.      Left knee: Effusion present.      Instability Tests: Medial David test negative and lateral David test negative.   Neurological:      Mental Status: She is alert and oriented to person, place, and time.      Deep Tendon Reflexes: Reflexes are normal and symmetric.   Psychiatric:         Mood and Affect: Mood and affect normal.         Behavior: Behavior normal.         Thought Content: Thought content normal.         Judgment: Judgment normal.         Assessment:     Encounter Diagnoses   Name Primary?    Osteoarthritis of both knees, unspecified osteoarthritis type Yes    Chronic pain of both knees        Plan:      Mirna was seen today for pain and pain.    Diagnoses and all orders for this visit:    Osteoarthritis of both knees, unspecified osteoarthritis type  -     Iovera; Future    Chronic pain of both knees  -     Iovera; Future        I had a long discussion with her today about cryoablation of the genicular nerves both deep and superficial and how it might aid her and pain relief.  After some discussion we decided to proceed with prior authorization for Iovera.  She would like to start with superficial nerve ablation of the left knee 1st.  Will have her follow-up once authorization obtained for procedure.

## 2024-01-14 DIAGNOSIS — C57.00 FALLOPIAN TUBE CANCER, CARCINOMA, UNSPECIFIED LATERALITY: ICD-10-CM

## 2024-01-16 RX ORDER — MORPHINE SULFATE 15 MG/1
15 TABLET, FILM COATED, EXTENDED RELEASE ORAL 2 TIMES DAILY
Qty: 60 TABLET | Refills: 0 | Status: SHIPPED | OUTPATIENT
Start: 2024-01-16 | End: 2024-02-12 | Stop reason: SDUPTHER

## 2024-01-18 ENCOUNTER — ANTI-COAG VISIT (OUTPATIENT)
Dept: CARDIOLOGY | Facility: CLINIC | Age: 64
End: 2024-01-18
Payer: MEDICARE

## 2024-01-18 ENCOUNTER — LAB VISIT (OUTPATIENT)
Dept: LAB | Facility: HOSPITAL | Age: 64
End: 2024-01-18
Attending: INTERNAL MEDICINE
Payer: MEDICARE

## 2024-01-18 DIAGNOSIS — Z79.01 LONG TERM (CURRENT) USE OF ANTICOAGULANTS: ICD-10-CM

## 2024-01-18 DIAGNOSIS — Z79.01 WARFARIN ANTICOAGULATION: Primary | ICD-10-CM

## 2024-01-18 DIAGNOSIS — I82.90 VTE (VENOUS THROMBOEMBOLISM): ICD-10-CM

## 2024-01-18 DIAGNOSIS — Z79.01 WARFARIN ANTICOAGULATION: ICD-10-CM

## 2024-01-18 DIAGNOSIS — I82.5Z2 CHRONIC DEEP VEIN THROMBOSIS (DVT) OF DISTAL VEIN OF LEFT LOWER EXTREMITY: ICD-10-CM

## 2024-01-18 LAB
INR PPP: 1.8 (ref 0.8–1.2)
PROTHROMBIN TIME: 19.3 SEC (ref 9–12.5)

## 2024-01-18 PROCEDURE — 85610 PROTHROMBIN TIME: CPT | Performed by: INTERNAL MEDICINE

## 2024-01-18 PROCEDURE — 36415 COLL VENOUS BLD VENIPUNCTURE: CPT | Performed by: INTERNAL MEDICINE

## 2024-01-18 PROCEDURE — 93793 ANTICOAG MGMT PT WARFARIN: CPT | Mod: S$GLB,,,

## 2024-01-18 NOTE — PROGRESS NOTES
[facility]    2024 11:07 AM    Assessment/Plan:    Patient presents today with subtherapeutic  INR.    Recommendation for patient's warfarin regimen: Increase maintenance dose    Recommend repeat INR in 2.5 weeks  _________________________________________________________________    Mirna ROE Aldair (63 y.o.) is followed by the [facility].    Anticoagulation Summary  As of 2024      INR goal:  2.0-2.5   TTR:  0.0 % (1.1 mo)   INR used for dosin.8 (2024)   Warfarin maintenance plan:  7.5 mg (7.5 mg x 1) every day   Weekly warfarin total:  52.5 mg   Plan last modified:  Evon Sarmiento, PharmD (2024)   Next INR check:  2024   Target end date:      Indications    Warfarin anticoagulation [Z79.01]  Long term (current) use of anticoagulants [Z79.01]  Chronic deep vein thrombosis (DVT) of distal vein of left lower extremity [I82.5Z2]                 Anticoagulation Episode Summary       INR check location:      Preferred lab:      Send INR reminders to:  ANTOINE CABRERA PROVIDER    Comments:  [facility] [place] Phone   [phone] Fax   [phone]          Anticoagulation Care Providers       Provider Role Specialty Phone number    Emil Burgess MD [facility] Medical Oncology [phone]            Patient Findings       Negatives:  Signs/symptoms of thrombosis, Signs/symptoms of bleeding, Laboratory test error suspected, Change in health, Change in alcohol use, Change in activity, Upcoming invasive procedure, Emergency department visit, Upcoming dental procedure, Missed doses, Extra doses, Change in medications, Change in diet/appetite, Hospital admission, Bruising, Other complaints    Comments:  Pt confirmed correct dosing per calendar. She confirmed no changes have been made to her medications or diet. No other issues noted.

## 2024-01-19 ENCOUNTER — TELEPHONE (OUTPATIENT)
Dept: GASTROENTEROLOGY | Facility: CLINIC | Age: 64
End: 2024-01-19
Payer: MEDICARE

## 2024-01-23 ENCOUNTER — HOSPITAL ENCOUNTER (OUTPATIENT)
Dept: RADIOLOGY | Facility: HOSPITAL | Age: 64
Discharge: HOME | End: 2024-01-23
Payer: MEDICARE

## 2024-01-23 DIAGNOSIS — R92.1 BREAST CALCIFICATIONS: ICD-10-CM

## 2024-01-23 PROCEDURE — 19081 BX BREAST 1ST LESION STRTCTC: CPT | Mod: RT,,, | Performed by: RADIOLOGY

## 2024-01-23 PROCEDURE — 25000003 PHARM REV CODE 250

## 2024-01-23 PROCEDURE — 76098 X-RAY EXAM SURGICAL SPECIMEN: CPT | Mod: TC

## 2024-01-23 PROCEDURE — 88305 TISSUE EXAM BY PATHOLOGIST: CPT | Mod: TC | Performed by: PATHOLOGY

## 2024-01-23 PROCEDURE — A4648 IMPLANTABLE TISSUE MARKER: HCPCS

## 2024-01-23 PROCEDURE — 76098 X-RAY EXAM SURGICAL SPECIMEN: CPT | Mod: 26,59,, | Performed by: RADIOLOGY

## 2024-01-23 RX ADMIN — LIDOCAINE HYDROCHLORIDE 30 ML: 10; .005 INJECTION, SOLUTION EPIDURAL; INFILTRATION; INTRACAUDAL; PERINEURAL at 11:01

## 2024-01-24 ENCOUNTER — TELEPHONE (OUTPATIENT)
Dept: FAMILY MEDICINE | Facility: CLINIC | Age: 64
End: 2024-01-24
Payer: MEDICARE

## 2024-01-24 DIAGNOSIS — R92.8 FOLLOW-UP EXAMINATION OF ABNORMAL MAMMOGRAM: Primary | ICD-10-CM

## 2024-01-24 NOTE — PROGRESS NOTES
Please let Ms. Quinteros know that her biopsy of her breast was negative for malignancy. I am forwarding results to Dr. Burgess and Dr. Hanson

## 2024-01-24 NOTE — TELEPHONE ENCOUNTER
----- Message from MATT Mcdonough-OSCAR sent at 1/24/2024  3:55 PM CST -----  Please let Ms. Quinteros know that her biopsy of her breast was negative for malignancy. I am forwarding results to Dr. Burgess and Dr. Hanson

## 2024-01-25 ENCOUNTER — TELEPHONE (OUTPATIENT)
Dept: ONCOLOGY | Facility: CLINIC | Age: 64
End: 2024-01-25

## 2024-01-25 NOTE — TELEPHONE ENCOUNTER
Spoke with pt in regards to benign breast biopsy results. Pt verbalized understanding. 6 month f/u mammo is recommended.

## 2024-01-31 ENCOUNTER — OFFICE VISIT (OUTPATIENT)
Dept: GASTROENTEROLOGY | Facility: CLINIC | Age: 64
End: 2024-01-31
Payer: MEDICARE

## 2024-01-31 VITALS
BODY MASS INDEX: 36.88 KG/M2 | HEART RATE: 70 BPM | SYSTOLIC BLOOD PRESSURE: 138 MMHG | HEIGHT: 67 IN | DIASTOLIC BLOOD PRESSURE: 74 MMHG | WEIGHT: 235 LBS

## 2024-01-31 DIAGNOSIS — R19.8 IRREGULAR BOWEL HABITS: ICD-10-CM

## 2024-01-31 DIAGNOSIS — Z87.19 HISTORY OF PANCREATITIS: ICD-10-CM

## 2024-01-31 DIAGNOSIS — K59.00 CONSTIPATION, UNSPECIFIED CONSTIPATION TYPE: ICD-10-CM

## 2024-01-31 DIAGNOSIS — R10.33 PERIUMBILICAL ABDOMINAL PAIN: Primary | ICD-10-CM

## 2024-01-31 DIAGNOSIS — Z90.49 S/P CHOLECYSTECTOMY: ICD-10-CM

## 2024-01-31 DIAGNOSIS — Z79.01 LONG TERM (CURRENT) USE OF ANTICOAGULANTS: ICD-10-CM

## 2024-01-31 DIAGNOSIS — Z85.44 HISTORY OF CANCER OF FALLOPIAN TUBE IN ADULTHOOD: ICD-10-CM

## 2024-01-31 DIAGNOSIS — R15.9 INCONTINENCE OF FECES, UNSPECIFIED FECAL INCONTINENCE TYPE: ICD-10-CM

## 2024-01-31 DIAGNOSIS — K21.9 GASTROESOPHAGEAL REFLUX DISEASE, UNSPECIFIED WHETHER ESOPHAGITIS PRESENT: ICD-10-CM

## 2024-01-31 PROCEDURE — 1126F AMNT PAIN NOTED NONE PRSNT: CPT | Mod: CPTII,S$GLB,,

## 2024-01-31 PROCEDURE — 99999 PR PBB SHADOW E&M-EST. PATIENT-LVL V: CPT | Mod: PBBFAC,,,

## 2024-01-31 PROCEDURE — 1160F RVW MEDS BY RX/DR IN RCRD: CPT | Mod: CPTII,S$GLB,,

## 2024-01-31 PROCEDURE — 1101F PT FALLS ASSESS-DOCD LE1/YR: CPT | Mod: CPTII,S$GLB,,

## 2024-01-31 PROCEDURE — 3008F BODY MASS INDEX DOCD: CPT | Mod: CPTII,S$GLB,,

## 2024-01-31 PROCEDURE — 99204 OFFICE O/P NEW MOD 45 MIN: CPT | Mod: S$GLB,,,

## 2024-01-31 PROCEDURE — 1159F MED LIST DOCD IN RCRD: CPT | Mod: CPTII,S$GLB,,

## 2024-01-31 PROCEDURE — 3288F FALL RISK ASSESSMENT DOCD: CPT | Mod: CPTII,S$GLB,,

## 2024-01-31 PROCEDURE — 3075F SYST BP GE 130 - 139MM HG: CPT | Mod: CPTII,S$GLB,,

## 2024-01-31 PROCEDURE — 3078F DIAST BP <80 MM HG: CPT | Mod: CPTII,S$GLB,,

## 2024-01-31 NOTE — PROGRESS NOTES
Subjective:       Patient ID: Mirna Quinteros is a 63 y.o. female Body mass index is 36.81 kg/m².    Chief Complaint: Abdominal Pain    This patient is new to me.     Patient's daughter present and assisting with history.    Abdominal Pain  This is a chronic problem. The onset quality is sudden. The problem occurs intermittently (every other day). Duration: lasts 5-10 minutes. The problem has been gradually worsening. The pain is located in the periumbilical region (During episodes of abdominal pain, patient and patient's daughter have noticed something visibly moving through her abdomen). Pain scale: rated 9.5 currently. The pain is severe. The quality of the pain is sharp (Severe pain in epigastric area that feels as though somebody is punching her in abdomen). The abdominal pain does not radiate. Associated symptoms include constipation (currently having 1 BM daily rated stool 1 on Sutherland scale; tried MiraLax, which caused her to have diarrheal episodes in the middle of the night; risk factor: morphine (chronic pain)). Pertinent negatives include no diarrhea, dysuria, fever, hematochezia, hematuria, melena, nausea, vomiting or weight loss. Associated symptoms comments: Patient also reports intermittent fecal incontinence that occurs 1 to 2 times a month. PMH of metastatic serous ovarian cancer, HTN, HLD, arthritis, COPD, PE/DVT, DM2, GERD, YANIQUE on CPAP, L/S CCY (2011), tubal ligation (1979). The pain is aggravated by movement and certain positions (bending). The pain is relieved by Nothing. She has tried proton pump inhibitors and oral narcotic analgesics (Prevacid 30 mg once daily) for the symptoms. The treatment provided no relief. Prior diagnostic workup includes GI consult, lower endoscopy, upper endoscopy and CT scan. Her past medical history is significant for abdominal surgery (history of fallopian tube cancer s/p MARCELO/BSO, omentectomy, radical tumor debulking, cytoreduction, appendectomy, partial  hepatectomy, and HIPEC per notes), GERD (well-controlled taking Prevacid 30 mg once daily; history of EGD 2022) and pancreatitis (2018). There is no history of colon cancer, Crohn's disease, gallstones, irritable bowel syndrome, PUD or ulcerative colitis. Patient's medical history does not include kidney stones and UTI.     Review of Systems   Constitutional:  Negative for activity change, appetite change, chills, diaphoresis, fatigue, fever, unexpected weight change and weight loss.   HENT:  Negative for sore throat and trouble swallowing.    Respiratory:  Negative for cough, choking and shortness of breath.    Cardiovascular:  Negative for chest pain.   Gastrointestinal:  Positive for abdominal pain and constipation (currently having 1 BM daily rated stool 1 on Bramwell scale; tried MiraLax, which caused her to have diarrheal episodes in the middle of the night; risk factor: morphine (chronic pain)). Negative for abdominal distention, anal bleeding, blood in stool, diarrhea, hematochezia, melena, nausea, rectal pain and vomiting.   Genitourinary:  Negative for dysuria and hematuria.       No LMP recorded. Patient is postmenopausal.  Past Medical History:   Diagnosis Date    Arthritis     Asthma     C. difficile colitis     Depression     Diabetes mellitus, type 2     Diabetic neuropathy     DVT (deep venous thrombosis)     Enteritis due to Norovirus 02/22/2019    Feeding difficulty in adult 11/22/2021    Formatting of this note might be different from the original. Added automatically from request for surgery [id number] Last Assessment & Plan: Formatting of this note might be different from the original. · Patient with history of ovarian cancer s/p MARCELO/BSO, omentectomy, radical tumor debulking, cytoreduction, appendectomy, partial hepatectomy, and HIPEC per Dr. Forrester and Dr. Jordan 12/13 with postope    GERD (gastroesophageal reflux disease)     Hyperlipidemia     Hypertension     Internal hemorrhoids     Pancreatitis  2018    Last Assessment & Plan: Formatting of this note might be different from the original. Mirna is a  58 year old female presenting with abdominal pain.  CT abd/pevis w/o contrast revealed acute pancreatitis involving pancreatic head with no fluid collections/necrosis.  EGD from 2018 unremarkable. Assessment: Today patient is sitting up and interactive.  No acute stress obvious.   Reports pain in    Pulmonary embolism     Respiratory failure with hypoxia 10/06/2021    Rheumatoid arthritis(714.0)     Right upper quadrant pain 2021    SOB (shortness of breath) 2023     Past Surgical History:   Procedure Laterality Date    CARPAL TUNNEL RELEASE Left     CHOLECYSTECTOMY      COLONOSCOPY      about 10 years ago    KNEE ARTHROSCOPY      KNEE SURGERY      TUBAL LIGATION      UPPER GASTROINTESTINAL ENDOSCOPY       Family History   Problem Relation Age of Onset    Heart disease Mother     Hypertension Mother     Diabetes Father     Stroke Father     Hypertension Father     Stroke Sister     Stroke Maternal Aunt     Stroke Maternal Aunt     Breast cancer Neg Hx     Colon cancer Neg Hx     Ovarian cancer Neg Hx     Colon polyps Neg Hx     Esophageal cancer Neg Hx     Stomach cancer Neg Hx      Social History     Tobacco Use    Smoking status: Former     Current packs/day: 0.00     Types: Cigarettes     Quit date: 2015     Years since quittin.8    Smokeless tobacco: Never    Tobacco comments:      PPD   Substance Use Topics    Alcohol use: No     Comment: pt states quit drinking 1 mo 1/2 ago    Drug use: No     Wt Readings from Last 10 Encounters:   24 106.6 kg (235 lb 0.2 oz)   24 108.4 kg (238 lb 15.7 oz)   24 108.4 kg (239 lb)   23 108.6 kg (239 lb 6.7 oz)   23 108 kg (238 lb)   23 109.4 kg (241 lb 2.9 oz)   22 104.3 kg (230 lb)   16 115.7 kg (255 lb)   16 111.1 kg (245 lb)   09/30/15 113.4 kg (250 lb)     Lab Results   Component  Value Date    WBC 4.07 01/11/2024    HGB 11.7 (L) 01/11/2024    HCT 34.4 (L) 01/11/2024    MCV 89 01/11/2024     01/11/2024     CMP  Sodium   Date Value Ref Range Status   01/11/2024 140 136 - 145 mmol/L Final     Potassium   Date Value Ref Range Status   01/11/2024 4.0 3.5 - 5.1 mmol/L Final     Chloride   Date Value Ref Range Status   01/11/2024 106 95 - 110 mmol/L Final     CO2   Date Value Ref Range Status   01/11/2024 27 23 - 29 mmol/L Final     Glucose   Date Value Ref Range Status   01/11/2024 149 (H) 70 - 110 mg/dL Final     BUN   Date Value Ref Range Status   01/11/2024 12 8 - 23 mg/dL Final     Creatinine   Date Value Ref Range Status   01/11/2024 0.8 0.5 - 1.4 mg/dL Final     Calcium   Date Value Ref Range Status   01/11/2024 9.0 8.7 - 10.5 mg/dL Final     Total Protein   Date Value Ref Range Status   01/11/2024 7.6 6.0 - 8.4 g/dL Final     Albumin   Date Value Ref Range Status   01/11/2024 4.1 3.5 - 5.2 g/dL Final     Total Bilirubin   Date Value Ref Range Status   01/11/2024 0.5 0.1 - 1.0 mg/dL Final     Comment:     For infants and newborns, interpretation of results should be based  on gestational age, weight and in agreement with clinical  observations.    Premature Infant recommended reference ranges:  Up to 24 hours.............<8.0 mg/dL  Up to 48 hours............<12.0 mg/dL  3-5 days..................<15.0 mg/dL  6-29 days.................<15.0 mg/dL       Alkaline Phosphatase   Date Value Ref Range Status   01/11/2024 75 55 - 135 U/L Final     AST   Date Value Ref Range Status   01/11/2024 14 10 - 40 U/L Final     ALT   Date Value Ref Range Status   01/11/2024 12 10 - 44 U/L Final     Anion Gap   Date Value Ref Range Status   01/11/2024 7 (L) 8 - 16 mmol/L Final     eGFR if    Date Value Ref Range Status   02/05/2016 >60 >60 mL/min/1.73 m^2 Final     eGFR if non    Date Value Ref Range Status   02/05/2016 >60 >60 mL/min/1.73 m^2 Final     Comment:      Calculation used to obtain the estimated glomerular filtration  rate (eGFR) is the CKD-EPI equation. Since race is unknown   in our information system, the eGFR values for   -American and Non--American patients are given   for each creatinine result.       Lab Results   Component Value Date    AMYLASE 54 02/05/2016     Lab Results   Component Value Date    LIPASE 30 02/05/2016     Lab Results   Component Value Date    TSH 1.644 12/13/2023     Reviewed prior medical records including radiology report of CT chest abdomen and pelvis 12/14/2023, CT abdomen and pelvis 06/07/2023, CT abdomen and pelvis 02/27/2023 & endoscopy history (see surgical history).    Objective:      Physical Exam  Vitals and nursing note reviewed.   Constitutional:       General: She is not in acute distress.     Appearance: Normal appearance. She is obese. She is not ill-appearing.   HENT:      Mouth/Throat:      Lips: Pink. No lesions.   Cardiovascular:      Rate and Rhythm: Normal rate.      Pulses: Normal pulses.      Heart sounds: Normal heart sounds.   Pulmonary:      Effort: Pulmonary effort is normal. No respiratory distress.      Breath sounds: Normal breath sounds.   Abdominal:      General: Abdomen is protuberant. A surgical scar is present. Bowel sounds are normal. There is no distension or abdominal bruit. There are no signs of injury.      Palpations: Abdomen is soft. There is no shifting dullness, fluid wave, hepatomegaly, splenomegaly or mass.      Tenderness: There is generalized abdominal tenderness and tenderness in the periumbilical area. There is no guarding or rebound. Negative signs include Aguayo's sign, Rovsing's sign and McBurney's sign.   Skin:     General: Skin is warm and dry.      Coloration: Skin is not jaundiced or pale.   Neurological:      Mental Status: She is alert and oriented to person, place, and time.   Psychiatric:         Attention and Perception: Attention normal.         Mood and Affect:  Mood normal.         Speech: Speech normal.         Behavior: Behavior normal.         Assessment:       1. Periumbilical abdominal pain    2. Constipation, unspecified constipation type    3. Irregular bowel habits    4. Incontinence of feces, unspecified fecal incontinence type    5. Gastroesophageal reflux disease, unspecified whether esophagitis present    6. History of cancer of fallopian tube in adulthood    7. Long term (current) use of anticoagulants    8. History of pancreatitis    9. S/P cholecystectomy        Plan:       Periumbilical abdominal pain  - schedule Colonoscopy, discussed procedure with the patient, including risks and benefits, patient verbalized understanding  - consider EGD  -     CT Abdomen Pelvis  Without Contrast; Future; Expected date: 01/31/2024  -     Case Request Endoscopy: COLONOSCOPY    Constipation, unspecified constipation type  - schedule Colonoscopy, discussed procedure with the patient, including risks and benefits, patient verbalized understanding  -Recommend daily exercise as tolerated, adequate water intake (six 8-oz glasses of water daily), and high fiber diet. OTC fiber supplements are recommended if diet does not reach daily fiber goal (20-30 grams daily), such as Metamucil, Citrucel, or FiberCon (take as directed, separate from other oral medications by >2 hours).  - START: linaCLOtide (LINZESS) 72 mcg Cap capsule; Take 1 capsule (72 mcg total) by mouth before breakfast.  Dispense: 30 capsule; Refill: 2  - side effect of narcotic pain medications is constipation, advised patient to talk to provider who manages pain medication    Irregular bowel habits  - schedule Colonoscopy, discussed procedure with the patient, including risks and benefits, patient verbalized understanding    Incontinence of feces, unspecified fecal incontinence type  - schedule Colonoscopy, discussed procedure with the patient, including risks and benefits, patient verbalized understanding  -  Recommended increase fiber in diet, especially soluble fiber since this can help bulk up the stool consistency and may help to slow down how fast the stool goes through the colon  - Consider referral to general surgery and/or pelvic floor physical therapy, if symptoms persist despite use of above recommendations    Gastroesophageal reflux disease, unspecified whether esophagitis present  -Avoid large meals, avoid eating within 2-3 hours of bedtime (avoid late night eating & lying down soon after eating), elevate head of bed if nocturnal symptoms are present, smoking cessation (if current smoker), & weight loss (if overweight).   -Avoid known foods which trigger reflux symptoms & to minimize/avoid high-fat foods, chocolate, caffeine, citrus, alcohol, & tomato products.  -Avoid/limit use of NSAID's, since they can cause GI upset, bleeding, and/or ulcers. If needed, take with food.  - continue Prevacid 30 mg once daily 30 minutes to an hour before breakfast  - consider EGD    History of cancer of fallopian tube in adulthood  - follow-up with hematology Oncology for continued evaluation and management    Long term (current) use of anticoagulants  - informed patient that the anticoagulant(s) will likely need to be held for endoscopy, nurse will confirm with endoscopist, cardiologist, and/or PCP.    History of pancreatitis    S/P cholecystectomy     Follow up in about 4 weeks (around 2/28/2024), or if symptoms worsen or fail to improve.      If no improvement in symptoms or symptoms worsen, call/follow-up at clinic or go to ER.        Total time spent on the encounter includes face to face time and non-face to face time preparing to see the patient (eg, review of tests), Obtaining and/or reviewing separately obtained history, Documenting clinical information in the electronic or other health record, Independently interpreting results (not separately reported) and communicating results to the patient/family/caregiver, or  Care coordination (not separately reported).     A dictation software program was used for this note. Please expect some simple typographical  errors in this note.

## 2024-02-03 ENCOUNTER — NURSE TRIAGE (OUTPATIENT)
Dept: ADMINISTRATIVE | Facility: CLINIC | Age: 64
End: 2024-02-03
Payer: MEDICARE

## 2024-02-03 NOTE — TELEPHONE ENCOUNTER
Went to the dentist and had all top teeth pulled. Was given antibiotics and another medication. Her blood pressure is high, her face is swollen and she is in pain. Blood pressure 142/84 and pulse is 72. Was advised to give medication and monitor blood pressure. Dentist suggested that she take ibuprofen. The pharmacist refused to fill the Norco 10 which would interact with her Morphine. Dentist stated okay since the patient was already on Percocet and Morphine and family should continue to use those medications that she takes for her cancer. Daughter states that she has a lot of facial swelling, and extensive bleeding at this time along with uncontrolled pain.  Reason for Disposition   Patient sounds very sick or weak to the triager    Additional Information   Negative: [1] Life-threatening reaction (anaphylaxis) in the past to similar substance (e.g., food, insect bite/sting, chemical, etc.) AND [2] < 2 hours since exposure   Negative: Unresponsive, passed out or very weak   Negative: Swollen tongue   Negative: Difficulty breathing or wheezing   Negative: Sounds like a life-threatening emergency to the triager   Negative: Followed a face injury   Negative: [1] Bee sting AND [2] within last 24 hours   Negative: Insect bite suspected   Negative: Swelling mainly of lip(s)   Negative: Swelling mainly around the eyes   Negative: [1] SEVERE swelling of entire face AND [2] < 2 hours since exposure to high-risk allergen (e.g., peanuts, tree nuts, fish, shellfish or 1st dose of drug) AND [3] no serious symptoms AND [4] no serious allergic reaction in the past   Negative: Fever   Negative: Taking an ACE Inhibitor medicine (e.g., benazepril / LOTENSIN, captopril / CAPOTEN, enalapril / VASOTEC, lisinopril / ZESTRIL)    Protocols used: Face Swelling-A-AH

## 2024-02-05 ENCOUNTER — PATIENT MESSAGE (OUTPATIENT)
Dept: CARDIOLOGY | Facility: CLINIC | Age: 64
End: 2024-02-05

## 2024-02-05 ENCOUNTER — LAB VISIT (OUTPATIENT)
Dept: LAB | Facility: HOSPITAL | Age: 64
End: 2024-02-05
Payer: MEDICARE

## 2024-02-05 ENCOUNTER — ANTI-COAG VISIT (OUTPATIENT)
Dept: CARDIOLOGY | Facility: CLINIC | Age: 64
End: 2024-02-05
Payer: MEDICARE

## 2024-02-05 ENCOUNTER — PATIENT MESSAGE (OUTPATIENT)
Dept: HEMATOLOGY/ONCOLOGY | Facility: CLINIC | Age: 64
End: 2024-02-05
Payer: MEDICARE

## 2024-02-05 DIAGNOSIS — I82.5Z2 CHRONIC DEEP VEIN THROMBOSIS (DVT) OF DISTAL VEIN OF LEFT LOWER EXTREMITY: ICD-10-CM

## 2024-02-05 DIAGNOSIS — Z79.01 LONG TERM (CURRENT) USE OF ANTICOAGULANTS: ICD-10-CM

## 2024-02-05 DIAGNOSIS — Z79.01 WARFARIN ANTICOAGULATION: Primary | ICD-10-CM

## 2024-02-05 DIAGNOSIS — I82.90 VTE (VENOUS THROMBOEMBOLISM): ICD-10-CM

## 2024-02-05 DIAGNOSIS — R63.0 POOR APPETITE: Primary | ICD-10-CM

## 2024-02-05 DIAGNOSIS — Z79.01 WARFARIN ANTICOAGULATION: ICD-10-CM

## 2024-02-05 LAB
INR PPP: 2.5 (ref 0.8–1.2)
PROTHROMBIN TIME: 25 SEC (ref 9–12.5)

## 2024-02-05 PROCEDURE — 93793 ANTICOAG MGMT PT WARFARIN: CPT | Mod: S$GLB,,,

## 2024-02-05 PROCEDURE — 36415 COLL VENOUS BLD VENIPUNCTURE: CPT | Performed by: INTERNAL MEDICINE

## 2024-02-05 PROCEDURE — 85610 PROTHROMBIN TIME: CPT | Performed by: INTERNAL MEDICINE

## 2024-02-05 RX ORDER — DRONABINOL 2.5 MG/1
2.5 CAPSULE ORAL DAILY
Status: CANCELLED | OUTPATIENT
Start: 2024-02-05

## 2024-02-05 RX ORDER — DRONABINOL 2.5 MG/1
2.5 CAPSULE ORAL DAILY
Qty: 30 CAPSULE | Refills: 0 | Status: SHIPPED | OUTPATIENT
Start: 2024-02-05 | End: 2024-02-06 | Stop reason: SDUPTHER

## 2024-02-05 NOTE — TELEPHONE ENCOUNTER
Attempted to call pt twice. States phone is not able tot take calls at this time.   Pt daughter, Reyna,  is on Communication consent.     LMOR for a call back.

## 2024-02-05 NOTE — PROGRESS NOTES
[email or web address]    2024 2:00 PM    Assessment/Plan:    Patient presents today with therapeutic INR.    Recommendation for patient's warfarin regimen: Continue current maintenance dose    Recommend repeat INR in 3 weeks  _________________________________________________________________    Mirna ROE Aldair (63 y.o.) is followed by the [facility].    Anticoagulation Summary  As of 2024      INR goal:  2.0-2.5   TTR:  24.6 % (1.7 mo)   INR used for dosin.5 (2024)   Warfarin maintenance plan:  7.5 mg (7.5 mg x 1) every day   Weekly warfarin total:  52.5 mg   No change documented:  Evon Sarmiento, PharmD   Plan last modified:  Evon Sarmiento PharmD (2024)   Next INR check:  2024   Target end date:      Indications    Warfarin anticoagulation [Z79.01]  Long term (current) use of anticoagulants [Z79.01]  Chronic deep vein thrombosis (DVT) of distal vein of left lower extremity [I82.5Z2]                 Anticoagulation Episode Summary       INR check location:      Preferred lab:      Send INR reminders to:  ANTOINE CABRERA PROVIDER    Comments:  [facility] [place] Phone   [phone] Fax   [phone]          Anticoagulation Care Providers       Provider Role Specialty Phone number    Emil Burgess MD [facility] [phone]

## 2024-02-06 ENCOUNTER — PATIENT MESSAGE (OUTPATIENT)
Dept: HEMATOLOGY/ONCOLOGY | Facility: CLINIC | Age: 64
End: 2024-02-06
Payer: MEDICARE

## 2024-02-06 ENCOUNTER — TELEPHONE (OUTPATIENT)
Dept: PALLIATIVE MEDICINE | Facility: CLINIC | Age: 64
End: 2024-02-06
Payer: MEDICARE

## 2024-02-06 ENCOUNTER — HOSPITAL ENCOUNTER (OUTPATIENT)
Dept: RADIOLOGY | Facility: HOSPITAL | Age: 64
Discharge: HOME | End: 2024-02-06
Payer: MEDICARE

## 2024-02-06 DIAGNOSIS — R10.33 PERIUMBILICAL ABDOMINAL PAIN: ICD-10-CM

## 2024-02-06 DIAGNOSIS — G89.4 CHRONIC PAIN SYNDROME: Primary | ICD-10-CM

## 2024-02-06 DIAGNOSIS — R63.0 POOR APPETITE: ICD-10-CM

## 2024-02-06 PROCEDURE — 74176 CT ABD & PELVIS W/O CONTRAST: CPT | Mod: TC

## 2024-02-06 RX ORDER — DRONABINOL 2.5 MG/1
2.5 CAPSULE ORAL DAILY
Qty: 30 CAPSULE | Refills: 0 | Status: SHIPPED | OUTPATIENT
Start: 2024-02-06 | End: 2024-02-12 | Stop reason: SDUPTHER

## 2024-02-06 RX ORDER — OXYCODONE AND ACETAMINOPHEN 7.5; 325 MG/1; MG/1
1 TABLET ORAL EVERY 6 HOURS PRN
Qty: 120 TABLET | Refills: 0 | Status: SHIPPED | OUTPATIENT
Start: 2024-02-06 | End: 2024-04-05 | Stop reason: SDUPTHER

## 2024-02-06 NOTE — TELEPHONE ENCOUNTER
Attempted to call pt. Phone says it is not able to take calls at this time.   Attempted to call daughter. Wrong number listed in pt chart.   Attempted to call pt Friend, Bakari on pt contact list. LMOR for a call back.     We have made multiple attempted to call pt to advise to go to ER or contact surgeon that did extraction. And have been unable to reach   Hazel Barragan.

## 2024-02-06 NOTE — TELEPHONE ENCOUNTER
Spoke with pt's daughter to get pt rescheduled because the pt was late pt and daughter was upset but after speaking with them they agreed to the date and time offered. I added pt to the waiting list and will move pt up if theres a cancellation pt's daughter agreed.

## 2024-02-08 ENCOUNTER — PATIENT MESSAGE (OUTPATIENT)
Dept: HEMATOLOGY/ONCOLOGY | Facility: CLINIC | Age: 64
End: 2024-02-08
Payer: MEDICARE

## 2024-02-09 DIAGNOSIS — E11.49 DIABETES MELLITUS TYPE 2 WITH NEUROLOGICAL MANIFESTATIONS: Primary | Chronic | ICD-10-CM

## 2024-02-09 RX ORDER — FLASH GLUCOSE SENSOR
1 KIT MISCELLANEOUS
Qty: 1 KIT | Refills: 6 | Status: SHIPPED | OUTPATIENT
Start: 2024-02-09

## 2024-02-09 RX ORDER — TIRZEPATIDE 2.5 MG/.5ML
2.5 INJECTION, SOLUTION SUBCUTANEOUS WEEKLY
Qty: 4 PEN | Refills: 3 | Status: SHIPPED | OUTPATIENT
Start: 2024-02-09 | End: 2024-03-07 | Stop reason: SDUPTHER

## 2024-02-12 ENCOUNTER — OFFICE VISIT (OUTPATIENT)
Dept: PALLIATIVE MEDICINE | Facility: CLINIC | Age: 64
End: 2024-02-12
Payer: MEDICARE

## 2024-02-12 VITALS
HEART RATE: 55 BPM | TEMPERATURE: 98 F | BODY MASS INDEX: 36.02 KG/M2 | DIASTOLIC BLOOD PRESSURE: 71 MMHG | WEIGHT: 229.5 LBS | SYSTOLIC BLOOD PRESSURE: 139 MMHG | HEIGHT: 67 IN | OXYGEN SATURATION: 98 %

## 2024-02-12 DIAGNOSIS — Z51.5 ENCOUNTER FOR PALLIATIVE CARE: Primary | ICD-10-CM

## 2024-02-12 DIAGNOSIS — G47.00 INSOMNIA, UNSPECIFIED TYPE: ICD-10-CM

## 2024-02-12 DIAGNOSIS — F43.21 ADJUSTMENT DISORDER WITH DEPRESSED MOOD: ICD-10-CM

## 2024-02-12 DIAGNOSIS — Z71.89 ACP (ADVANCE CARE PLANNING): ICD-10-CM

## 2024-02-12 DIAGNOSIS — G89.3 CANCER RELATED PAIN: ICD-10-CM

## 2024-02-12 DIAGNOSIS — R63.0 ANOREXIA: ICD-10-CM

## 2024-02-12 DIAGNOSIS — K59.00 CONSTIPATION, UNSPECIFIED CONSTIPATION TYPE: ICD-10-CM

## 2024-02-12 DIAGNOSIS — G89.3 CANCER ASSOCIATED PAIN: ICD-10-CM

## 2024-02-12 DIAGNOSIS — R63.0 POOR APPETITE: ICD-10-CM

## 2024-02-12 DIAGNOSIS — C57.00 FALLOPIAN TUBE CANCER, CARCINOMA, UNSPECIFIED LATERALITY: ICD-10-CM

## 2024-02-12 DIAGNOSIS — R53.83 FATIGUE, UNSPECIFIED TYPE: ICD-10-CM

## 2024-02-12 PROCEDURE — 3078F DIAST BP <80 MM HG: CPT | Mod: CPTII,S$GLB,, | Performed by: NURSE PRACTITIONER

## 2024-02-12 PROCEDURE — 1125F AMNT PAIN NOTED PAIN PRSNT: CPT | Mod: CPTII,S$GLB,, | Performed by: NURSE PRACTITIONER

## 2024-02-12 PROCEDURE — 1123F ACP DISCUSS/DSCN MKR DOCD: CPT | Mod: CPTII,S$GLB,, | Performed by: NURSE PRACTITIONER

## 2024-02-12 PROCEDURE — 3075F SYST BP GE 130 - 139MM HG: CPT | Mod: CPTII,S$GLB,, | Performed by: NURSE PRACTITIONER

## 2024-02-12 PROCEDURE — 1101F PT FALLS ASSESS-DOCD LE1/YR: CPT | Mod: CPTII,S$GLB,, | Performed by: NURSE PRACTITIONER

## 2024-02-12 PROCEDURE — 99999 PR PBB SHADOW E&M-EST. PATIENT-LVL IV: CPT | Mod: PBBFAC,,, | Performed by: NURSE PRACTITIONER

## 2024-02-12 PROCEDURE — 99205 OFFICE O/P NEW HI 60 MIN: CPT | Mod: S$GLB,,, | Performed by: NURSE PRACTITIONER

## 2024-02-12 PROCEDURE — 1159F MED LIST DOCD IN RCRD: CPT | Mod: CPTII,S$GLB,, | Performed by: NURSE PRACTITIONER

## 2024-02-12 PROCEDURE — 3008F BODY MASS INDEX DOCD: CPT | Mod: CPTII,S$GLB,, | Performed by: NURSE PRACTITIONER

## 2024-02-12 PROCEDURE — 3288F FALL RISK ASSESSMENT DOCD: CPT | Mod: CPTII,S$GLB,, | Performed by: NURSE PRACTITIONER

## 2024-02-12 RX ORDER — DRONABINOL 2.5 MG/1
2.5 CAPSULE ORAL 2 TIMES DAILY
Qty: 30 CAPSULE | Refills: 0 | Status: SHIPPED | OUTPATIENT
Start: 2024-02-12 | End: 2024-02-12

## 2024-02-12 RX ORDER — MORPHINE SULFATE 15 MG/1
15 TABLET, FILM COATED, EXTENDED RELEASE ORAL 2 TIMES DAILY
Qty: 60 TABLET | Refills: 0 | Status: SHIPPED | OUTPATIENT
Start: 2024-02-12 | End: 2024-02-12

## 2024-02-12 RX ORDER — DRONABINOL 5 MG/1
5 CAPSULE ORAL 2 TIMES DAILY
Qty: 60 CAPSULE | Refills: 0 | Status: SHIPPED | OUTPATIENT
Start: 2024-02-12 | End: 2024-03-13

## 2024-02-12 RX ORDER — POLYETHYLENE GLYCOL 3350 17 G/17G
17 POWDER, FOR SOLUTION ORAL DAILY
Qty: 510 G | Refills: 0 | Status: SHIPPED | OUTPATIENT
Start: 2024-02-12 | End: 2024-03-13

## 2024-02-12 RX ORDER — MORPHINE SULFATE 30 MG/1
30 TABLET, FILM COATED, EXTENDED RELEASE ORAL 2 TIMES DAILY
Qty: 60 TABLET | Refills: 0 | Status: SHIPPED | OUTPATIENT
Start: 2024-02-12 | End: 2024-03-13

## 2024-02-12 NOTE — PROGRESS NOTES
"Consult Note  Palliative Care      Consult Requested By: Aaareferral Self  Reason for Consult: symptom mgmt/ACP       ASSESSMENT/PLAN:     Plan/Recommendations:    02/12/2024:  - initial visit  - increased ms contin 30 mg   - increased dronabinol to 5 mg BID  - HCPOA completed     Fallopian tube carcinoma  - ID 8/2021  - 12/2021 debulking, total hysterectomy and bilateral salpingo-oophorectomy   - following w Dr. Burgess  - ERIKA    Encounter for palliative  - Patient is decisional  - Patient accompanied today by daughter   - HCPOA uploaded into EMR on 2/12/24  - Philosophy of Palliative Medicine reviewed with patient and family at first visit  - New patient folder given to and reviewed with patient and family at first visit  - Goals of care: To "feel normal again". Tolerable symptoms, improved pain.     Cancer pain   - describes as stomach pain "grinding, like something moving"   - currently on MS Contin 15 mg q 12 hour with percocet 7.5 mg, 2/12: increase MS Contin to 30 mg q 12 hrs  - narcan available  - opioid safety sheet given at first visit  - also has tizanidine  - will continue to monitor     Adjustment disorder with anxiety and depression   - tearful throughout visit  - irritable at times  - wants to feel normal and do the things she used to do  - she feels badly for the burden she imposes on her daughter, who had to move in with her to help with her care  - declines mental health counseling   - cont zoloft 100 mg daily  - emotional support provided  - [facility] care MSW present at initial visit  - will continue to monitor     Anorexia  - dronabinol 2.5 mg   - remeron 15 mg qhs     Constipation  - BM sometimes once daily but feels incomplete  - following with GI, per note: "rec daily exercise as tolerated, adequate water intake (six 8-oz glasses of water daily), and high fiber diet. OTC fiber supplements are recommended if diet does not reach daily fiber goal (20-30 grams daily), such as Metamucil, Citrucel, or " "FiberCon (take as directed, separate from other oral medications by >2 hours). 01/31/2024 - START: linaCLOtide (LINZESS) 72 mcg Cap capsule; Take 1 capsule (72 mcg total) by mouth before breakfast."  - say migdalia doesn't help  - pending colonoscopy   - previously on miralax, request refill which is given at initial visit   - endorses good hydration     Insomnia/fatigue  - establish better pain control to improve sleep  - remeron 15 mg qhs    Understanding of illness/Prognosis: fair    Follow up: 6-8 weeks    Patient's encounter and above plan of care discussed with patient's oncology team.     SUBJECTIVE:     History of Present Illness:  Patient is a 63 y.o. year old female presenting with fallopian tube carcinoma. Please see oncology notes for full oncologic history and treatment course.     02/12/2024:  MICHAEL VELOZ reviewed    Patient presents to initial visit with her daughter and grandchildren. Her daughter is initially standoffish and brusk but eventually patient daughter are tearful throughout visit. Patient is in severe pain today due to very recent oral surgery. Her cancer related pain is severe and feels like something is moving inside of her, morphine helps but not enough to make her comfortable. Her goal is to feel normal again; she is currently ERIKA but feels unwell much of the time. She has been caring for her son's children for many years however now that she is sick, her adult daughter has had to put her life on hold and move in with, and help care for, her mother and nephews/nieces. Though they all seem to be struggling emotionally, they decline mental health support at this time, patient says she is thinking about it. RTC in 6-8 weeks.       Past Medical History:   Diagnosis Date    Arthritis     Asthma     C. difficile colitis     Depression     Diabetes mellitus, type 2     Diabetic neuropathy     DVT (deep venous thrombosis)     Enteritis due to Norovirus 02/22/2019    Feeding difficulty in adult " 11/22/2021    Formatting of this note might be different from the original. Added automatically from request for surgery [id number] Last Assessment & Plan: Formatting of this note might be different from the original. · Patient with history of ovarian cancer s/p MARCELO/BSO, omentectomy, radical tumor debulking, cytoreduction, appendectomy, partial hepatectomy, and HIPEC per Dr. Forrester and Dr. Jordan 12/13 with postope    GERD (gastroesophageal reflux disease)     Hyperlipidemia     Hypertension     Internal hemorrhoids     Pancreatitis 09/23/2018    Last Assessment & Plan: Formatting of this note might be different from the original. Mirna is a  58 year old female presenting with abdominal pain.  CT abd/pevis w/o contrast revealed acute pancreatitis involving pancreatic head with no fluid collections/necrosis.  EGD from 9/24/2018 unremarkable. Assessment: Today patient is sitting up and interactive.  No acute stress obvious.   Reports pain in    Pulmonary embolism     Respiratory failure with hypoxia 10/06/2021    Rheumatoid arthritis(714.0)     Right upper quadrant pain 08/22/2021    SOB (shortness of breath) 02/24/2023     Past Surgical History:   Procedure Laterality Date    CARPAL TUNNEL RELEASE Left     CHOLECYSTECTOMY      COLONOSCOPY      about 10 years ago    KNEE ARTHROSCOPY      KNEE SURGERY      TUBAL LIGATION      UPPER GASTROINTESTINAL ENDOSCOPY       Family History   Problem Relation Age of Onset    Heart disease Mother     Hypertension Mother     Diabetes Father     Stroke Father     Hypertension Father     Stroke Sister     Stroke Maternal Aunt     Stroke Maternal Aunt     Breast cancer Neg Hx     Colon cancer Neg Hx     Ovarian cancer Neg Hx     Colon polyps Neg Hx     Esophageal cancer Neg Hx     Stomach cancer Neg Hx      Review of patient's allergies indicates:   Allergen Reactions    Liraglutide Other (See Comments)     pancreatitis    Empagliflozin      Other reaction(s): yeast infxn    Glyburide       "Other reaction(s): unknown    Metformin      Other reaction(s): GI upset    Pioglitazone      Other reaction(s): Swelling    Iohexol Itching     Patient given 50mg benadryl im and itching subsided       Medications:    Current Outpatient Medications:     aspirin 81 mg Cap, Take 81 mg by mouth once daily., Disp: , Rfl:     BD INSULIN PEN NEEDLE UF MINI 31 x 3/16 " Ndle, , Disp: , Rfl: 0    droNABinol (MARINOL) 2.5 MG capsule, Take 1 capsule (2.5 mg total) by mouth once daily., Disp: 30 capsule, Rfl: 0    ezetimibe (ZETIA) 10 mg tablet, Take 10 mg by mouth once daily., Disp: , Rfl:     flash glucose sensor (FREESTYLE LUC 2 SENSOR) Kit, 1 each by Misc.(Non-Drug; Combo Route) route as needed., Disp: 1 kit, Rfl: 6    furosemide (LASIX) 40 MG tablet, Take 40 mg by mouth 2 (two) times a day., Disp: , Rfl:     hydrOXYzine HCL (ATARAX) 10 MG Tab, Take 10 mg by mouth 3 (three) times daily as needed., Disp: , Rfl:     insulin aspart (NOVOLOG FLEXPEN) 100 unit/mL InPn, Inject 8 Units into the skin 3 (three) times daily with meals., Disp: 1 Box, Rfl: 6    insulin degludec (TRESIBA FLEXTOUCH U-100) 100 unit/mL (3 mL) insulin pen, Inject 32 Units into the skin., Disp: , Rfl:     lansoprazole (PREVACID) 30 MG capsule, Take 30 mg by mouth once daily., Disp: , Rfl:     linaCLOtide (LINZESS) 72 mcg Cap capsule, Take 1 capsule (72 mcg total) by mouth before breakfast., Disp: 30 capsule, Rfl: 2    mirtazapine (REMERON) 15 MG tablet, Take 15 mg by mouth every evening., Disp: , Rfl:     montelukast (SINGULAIR) 10 mg tablet, Take 1 tablet (10 mg total) by mouth every evening., Disp: 90 tablet, Rfl: 1    morphine (MS CONTIN) 15 MG 12 hr tablet, Take 1 tablet (15 mg total) by mouth 2 (two) times daily., Disp: 60 tablet, Rfl: 0    MOUNJARO 2.5 mg/0.5 mL PnIj, Inject 2.5 mg into the skin once a week., Disp: 4 pen , Rfl: 3    ondansetron (ZOFRAN) 4 MG tablet, Take 1 tablet (4 mg total) by mouth every 8 (eight) hours as needed (Nausea and " vomiting)., Disp: 12 tablet, Rfl: 0    oxyCODONE-acetaminophen (PERCOCET) 7.5-325 mg per tablet, Take 1 tablet by mouth every 6 (six) hours as needed for Pain., Disp: 120 tablet, Rfl: 0    rosuvastatin (CRESTOR) 40 MG Tab, Take 20 mg by mouth once daily., Disp: , Rfl:     sertraline (ZOLOFT) 100 MG tablet, Take by mouth., Disp: , Rfl:     sumatriptan (IMITREX) 50 MG tablet, Take by mouth., Disp: , Rfl:     SYMBICORT 160-4.5 mcg/actuation HFAA, Inhale 2 puffs into the lungs every 12 (twelve) hours., Disp: , Rfl:     tiZANidine 4 mg Cap, Take 4 mg by mouth 2 (two) times daily as needed., Disp: , Rfl:     valsartan-hydrochlorothiazide (DIOVAN-HCT) 80-12.5 mg per tablet, Take 1 tablet by mouth once daily., Disp: , Rfl:     warfarin (COUMADIN) 7.5 MG tablet, Take 7.5 mg by mouth. Mon, Tue, Thurs, Fri, Sat, Sun. (Daily EXCEPT Wednesday), Disp: , Rfl:     ZEJULA 100 mg Tab, Take 1 tablet by mouth every morning., Disp: , Rfl:     OBJECTIVE:       ROS:  Review of Systems   Constitutional:  Positive for activity change, appetite change, fatigue and unexpected weight change.   HENT:  Positive for dental problem.    Eyes:  Negative for pain, discharge and itching.   Respiratory:  Negative for cough, choking and shortness of breath.    Cardiovascular:  Negative for chest pain, palpitations and leg swelling.   Gastrointestinal:  Positive for abdominal pain and constipation. Negative for diarrhea, nausea and vomiting.   Endocrine: Negative for polydipsia, polyphagia and polyuria.   Genitourinary:  Negative for difficulty urinating, dyspareunia and dysuria.   Musculoskeletal:  Positive for arthralgias and gait problem.   Skin:  Negative for pallor, rash and wound.   Neurological:  Positive for weakness.   Psychiatric/Behavioral:  Positive for dysphoric mood and sleep disturbance.        Review of Symptoms      Symptom Assessment (ESAS 0-10 Scale)  Pain:  9  Dyspnea:  0  Anxiety:  0  Nausea:  0  Depression:  0  Anorexia:   0  Fatigue:  5  Insomnia:  5  Restlessness:  0  Agitation:  0     CAM / Delirium:  Negative  Constipation:  Positive  Diarrhea:  Negative    Constipation:  Constipation    Bowel Management Plan (BMP):  Yes      Pain Assessment:  OME in 24 hours:  45  Location(s): abdomen    Abdomen       Location: generalized        Quality: Cramping        Quantity: 9/10 in intensity        Chronicity: Onset 0 second(s) ago, unchanged        Aggravating Factors: None        Alleviating Factors: Opiates        Associated Symptoms: None    Modified Patrick Scale:  0    Psychosocial/Cultural:   See Palliative Psychosocial Note: Yes  **Primary  to Follow**  Palliative Care  Consult: No      Advance Care Planning   Advance Directives:   Living Will: No    Medical Power of : Yes      Decision Making:  Patient answered questions  Goals of Care: The patient endorses that what is most important right now is to focus on remaining as independent as possible, symptom/pain control, and extending life as long as possible, even it it means sacrificing quality    Accordingly, we have decided that the best plan to meet the patient's goals includes continuing with treatment            Physical Exam:  Vitals:    Vitals:    02/12/24 1253   BP: 139/71   Pulse: (!) 55   Temp: 97.5 °F (36.4 °C)       Physical Exam  Vitals reviewed.   Constitutional:       General: She is in acute distress (pain).      Appearance: She is ill-appearing. She is not diaphoretic.   HENT:      Head: Normocephalic and atraumatic.      Right Ear: External ear normal.      Left Ear: External ear normal.      Nose: Nose normal.   Eyes:      General:         Right eye: No discharge.         Left eye: No discharge.      Extraocular Movements: Extraocular movements intact.      Conjunctiva/sclera: Conjunctivae normal.   Cardiovascular:      Rate and Rhythm: Bradycardia present.   Pulmonary:      Effort: Pulmonary effort is normal. No respiratory  distress.      Breath sounds: No stridor.   Musculoskeletal:      Cervical back: Normal range of motion.   Skin:     General: Skin is warm and dry.      Coloration: Skin is not jaundiced.      Findings: No bruising.   Neurological:      Mental Status: She is alert and oriented to person, place, and time. Mental status is at baseline.   Psychiatric:         Attention and Perception: Attention normal.         Mood and Affect: Mood is depressed. Affect is flat.         Behavior: Behavior is withdrawn.         Labs:  CBC:   WBC   Date Value Ref Range Status   01/11/2024 4.07 3.90 - 12.70 K/uL Final     Hemoglobin   Date Value Ref Range Status   01/11/2024 11.7 (L) 12.0 - 16.0 g/dL Final     Hematocrit   Date Value Ref Range Status   01/11/2024 34.4 (L) 37.0 - 48.5 % Final     MCV   Date Value Ref Range Status   01/11/2024 89 82 - 98 fL Final     Platelets   Date Value Ref Range Status   01/11/2024 202 150 - 450 K/uL Final       LFT:   Lab Results   Component Value Date    AST 14 01/11/2024    ALKPHOS 75 01/11/2024    BILITOT 0.5 01/11/2024       Albumin:   Albumin   Date Value Ref Range Status   01/11/2024 4.1 3.5 - 5.2 g/dL Final     Protein:   Total Protein   Date Value Ref Range Status   01/11/2024 7.6 6.0 - 8.4 g/dL Final       Radiology:I have reviewed all pertinent imaging results/findings within the past 24 hours.    02/06/2024 CT AP: IMPRESSION:  No acute or significant abnormality seen in the abdomen or pelvis.  Prior cholecystectomy and hysterectomy     This exam was performed according to our departmental dose-optimization program which includes automated exposure control, adjustment of the mA and/or kV according to patient size and/or use of iterative reconstruction technique.    I spent a total of 75 minutes on the day of the visit.This includes face to face time in discussion of goals of care, symptom assessment, coordination of care and emotional support.  This also includes non-face to face time  preparing to see the patient (eg, review of tests/imaging), obtaining and/or reviewing separately obtained history, documenting clinical information in the electronic or other health record, independently interpreting results and communicating results to the patient/family/caregiver, or care coordinator.     A total of 20 min was spent on advance care planning, goals of care discussion, emotional support, formulating and communicating prognosis and goals of care, exploring burden/benefit of various approaches of treatment. This discussion occurred on a fully voluntary basis with the verbal consent of the patient and/or family    Signature: Chasity Morris DNP

## 2024-02-14 NOTE — PROGRESS NOTES
Advance Care Planning   [facility] Palliative Medicine [facility]  Palliative Care   Psychosocial Assessment    Patient Name: Mirna Quinteros  MRN: [id number]  Palliative Care Provider: Chasity Morris DNP  Primary Care Physician: Hazel Veras APRN-CNP  Principal Problem:Fallopian Tube Cancer     Reason for Referral:  Advanced Care Planning and Psychosocial Support       Present during Interview: patient and ER records.      Primary Language:English   Needed: no      Past Medical Situation:   PMH:   Past Medical History:   Diagnosis Date    Arthritis     Asthma     C. difficile colitis     Depression     Diabetes mellitus, type 2     Diabetic neuropathy     DVT (deep venous thrombosis)     Enteritis due to Norovirus 02/22/2019    Feeding difficulty in adult 11/22/2021    Formatting of this note might be different from the original. Added automatically from request for surgery [id number] Last Assessment & Plan: Formatting of this note might be different from the original. · Patient with history of ovarian cancer s/p MARCELO/BSO, omentectomy, radical tumor debulking, cytoreduction, appendectomy, partial hepatectomy, and HIPEC per Dr. Forrester and Dr. Jordan 12/13 with postope    GERD (gastroesophageal reflux disease)     Hyperlipidemia     Hypertension     Internal hemorrhoids     Pancreatitis 09/23/2018    Last Assessment & Plan: Formatting of this note might be different from the original. Mirna is a  58 year old female presenting with abdominal pain.  CT abd/pevis w/o contrast revealed acute pancreatitis involving pancreatic head with no fluid collections/necrosis.  EGD from 9/24/2018 unremarkable. Assessment: Today patient is sitting up and interactive.  No acute stress obvious.   Reports pain in    Pulmonary embolism     Respiratory failure with hypoxia 10/06/2021    Rheumatoid arthritis(714.0)     Right upper quadrant pain 08/22/2021    SOB (shortness of breath) 02/24/2023     Mental  Health/Substance Use History: Patient exhibits s/s of depression   Risk of Abuse, neglect or exploitation: None identified   Current or Previous Trauma and/or evidence of PTSD: None identified   Non-traditional Health practices:  None identified     Understanding of diagnosis and prognosis: Fair   Experience/Comfort level with health care system: Fair     Patients Mental Status: Alert and oriented to person, place, time and situation with depressed affect     Socio-Economic Factors/Resources:  Address: [address] [place] [address]  Phone Number: [phone] (home)     Marital Status: Single  Household composition: Patient, daughter, and patient's grandchildren   Children: One son and one daughter    Patient/Family perceptions about Caregiving Needs; availability and capacity: Patient's daughter has stopped work and moved in with patient to provide patient with assistance. Daughter reports experiencing caregiver fatigue.     Family Dynamics/Relationships: Patient and daughter exhibit a close bond.  Patient and daughter have limited supports outside of each other.  Patient is estranged from her son, who fathered the grandchildren patient has raised for the last four years.     Patient/Family Strengths/Resilience: Patient and daughter appear to be strong advocates for patient's needs.   Patient/Family Coping: Patient and daughter would benefit from additional support in processing the events since patient's diagnosis.  Patient declines therapy at this time.     Activities of Daily Living: Daughter provides assistance with ADLs as needed, based on patient's tolerance level any given day.   Support Systems-Family & Community (Home Health, HME etc): n/a    Transportation:  yes    Work/Education History: disability  Self-Care Activities/Hobbies: not discussed      History: no    Financial Resources:Medicare      Advanced Care Planning & Legal Concerns:   Advanced Directives/Living Will: no  [place]/POLST:  "no   Planning:  no    Power of : yes  Surrogate Decision Maker: Amena/Reyna Martínez        Spirituality, Culture & Coping Mechanisms:  F- Chio and Belief: Voodoo     I - Importance: not discussed     C - Community/Culture Values: not discussed      A - Address in Care: TBD       Goals/Hopes/Expectations: "Be normal again"   Fears/Anxiety/Concerns: Daughter reports patient's disease "changed patient"      SW accompanied DNP during initial visit with patient and daughter/Reyna.  Also present were patient's school age grandchildren. Patient presents Oriented x4 with depressed mood. Daughter presents standoffish and noted patient "would not want to talk to a ". Daughter declined seat offered by staff and elected to stand behind provider's back (facing computer screen) or next to this writer during visit. Daughter reports patient's life changed immensely following cancer diagnosis.  Daughter reports she moved in with patient to provide assistance with ADLs and medication/appointment management.  Daughter notes she was forced to quit work in order to best care for patient. Daughter adds patient oftentimes requires assistance with ADLs dependent on patient's pain burden that day. Patient reports her goal is feel better and "be normal again".     Patient was awarded custody of her two grandchildren (children of her son) appx four years ago. Outside of patient's daughter, it appears the family support is limited.  Daughter notes she has been working to apply for additional assistance in the home, which would allow daughter to return to work. Team provided patient and daughter with a save environment to reflect upon the difficulties they have faced in adjusting to and dealing with patient's disease.  Team offered continued support via Onc-Psych or Pal Med DSW. Patient reports she will "think about it".     Patient completed HCPOA assigning daughter/Reyna as decision maker.  Directive " scanned into EPIC.  Patient and daughter deny further psychosocial concerns. SW remains available to provide support. SW will continue to follow.        Nica Del Valle, LCSW-BACS    Palliative Medicine

## 2024-02-19 ENCOUNTER — TELEPHONE (OUTPATIENT)
Dept: ORTHOPEDICS | Facility: CLINIC | Age: 64
End: 2024-02-19
Payer: MEDICARE

## 2024-02-19 NOTE — TELEPHONE ENCOUNTER
----- Message from Gilberto Quinonez MD sent at 2/19/2024  4:57 PM CST -----  It was supposed to be shallow first. Notes say she was called to set up appt but didn't answer.   ----- Message -----  From: Karuna Sullivan LPN  Sent: 2/16/2024   2:26 PM CST  To: Gilberto Quinonez MD    You orders the IOVERA, but didn't do it. What depth IOVERA should pt be scheduled for?  ----- Message -----  From: Chasity Morris DNP  Sent: 2/16/2024   2:17 PM CST  To: Devi SZYMANSKI Staff    [facility],     I saw this patient in [facility] clinic this week. She mentioned that she thinks she's supposed to have a f/u w Dr. Quinonez and asked me to help reach out. Can you help her?     Thanks, A

## 2024-02-21 DIAGNOSIS — E11.9 TYPE 2 DIABETES MELLITUS WITHOUT COMPLICATION: ICD-10-CM

## 2024-02-23 ENCOUNTER — PATIENT MESSAGE (OUTPATIENT)
Dept: HEMATOLOGY/ONCOLOGY | Facility: CLINIC | Age: 64
End: 2024-02-23
Payer: MEDICARE

## 2024-02-26 ENCOUNTER — LAB VISIT (OUTPATIENT)
Dept: LAB | Facility: HOSPITAL | Age: 64
End: 2024-02-26
Payer: MEDICARE

## 2024-02-26 DIAGNOSIS — Z79.01 LONG TERM (CURRENT) USE OF ANTICOAGULANTS: ICD-10-CM

## 2024-02-26 DIAGNOSIS — I82.90 VTE (VENOUS THROMBOEMBOLISM): ICD-10-CM

## 2024-02-26 DIAGNOSIS — Z79.01 WARFARIN ANTICOAGULATION: ICD-10-CM

## 2024-02-26 LAB
INR PPP: 4.8 (ref 0.8–1.2)
PROTHROMBIN TIME: 48.2 SEC (ref 9–12.5)

## 2024-02-26 PROCEDURE — 36415 COLL VENOUS BLD VENIPUNCTURE: CPT | Performed by: INTERNAL MEDICINE

## 2024-02-26 PROCEDURE — 85610 PROTHROMBIN TIME: CPT | Performed by: INTERNAL MEDICINE

## 2024-02-27 ENCOUNTER — TELEPHONE (OUTPATIENT)
Dept: HEMATOLOGY/ONCOLOGY | Facility: CLINIC | Age: 64
End: 2024-02-27
Payer: MEDICARE

## 2024-02-27 ENCOUNTER — PATIENT MESSAGE (OUTPATIENT)
Dept: CARDIOLOGY | Facility: CLINIC | Age: 64
End: 2024-02-27

## 2024-02-27 ENCOUNTER — DOCUMENTATION ONLY (OUTPATIENT)
Dept: HEMATOLOGY/ONCOLOGY | Facility: CLINIC | Age: 64
End: 2024-02-27
Payer: MEDICARE

## 2024-02-27 ENCOUNTER — ANTI-COAG VISIT (OUTPATIENT)
Dept: CARDIOLOGY | Facility: CLINIC | Age: 64
End: 2024-02-27
Payer: MEDICARE

## 2024-02-27 DIAGNOSIS — I82.5Z2 CHRONIC DEEP VEIN THROMBOSIS (DVT) OF DISTAL VEIN OF LEFT LOWER EXTREMITY: ICD-10-CM

## 2024-02-27 DIAGNOSIS — Z79.01 LONG TERM (CURRENT) USE OF ANTICOAGULANTS: ICD-10-CM

## 2024-02-27 DIAGNOSIS — Z79.01 WARFARIN ANTICOAGULATION: Primary | ICD-10-CM

## 2024-02-27 PROCEDURE — 93793 ANTICOAG MGMT PT WARFARIN: CPT | Mod: S$GLB,,,

## 2024-02-27 NOTE — PROGRESS NOTES
[facility]    02/27/2024 10:45 AM    Mirna Quinteros (63 y.o.) is followed by the [email or web address].      Assessment/Plan:    Mirna Quinteros presents today with supratherapeutic INR. Goal INR  2.0-2.5    Assessment of patient findings per MA/LPN and chart review:   The following significant findings were found:  Patient self reports taking warfarin as instructed per calendar.   Reports recently increasing the dose of her morphine sulfate as well as her dronabinol, of which there is no drug-drug interaction.     Recommendation for patient's warfarin regimen:   Due to supratherapeutic INR, patient was instructed to SKIP their next two warfarin doses between 2/27 to 2/28.  Will re-check INR on 2/29 and re-evaluate based on trends.     Recommended repeat INR in 3 days      Jimbo Pandya, PharmD.   Preferred Contact: Secure Messaging or In Basket Message

## 2024-02-27 NOTE — TELEPHONE ENCOUNTER
"----- Message from Elsa Diaz sent at 2/26/2024  4:47 PM CST -----  Consult/Advisory:      Name Of Caller: [facility]      Contact Preference:  [phone]       What is the nature of the call?:  Long has a Critical Lab       Additional Notes:  "Thank you for all that you do for our patients"          "

## 2024-02-29 DIAGNOSIS — E78.2 COMBINED HYPERLIPIDEMIA ASSOCIATED WITH TYPE 2 DIABETES MELLITUS: Primary | Chronic | ICD-10-CM

## 2024-02-29 DIAGNOSIS — K21.9 GASTROESOPHAGEAL REFLUX DISEASE, UNSPECIFIED WHETHER ESOPHAGITIS PRESENT: ICD-10-CM

## 2024-02-29 DIAGNOSIS — E11.69 COMBINED HYPERLIPIDEMIA ASSOCIATED WITH TYPE 2 DIABETES MELLITUS: Primary | Chronic | ICD-10-CM

## 2024-02-29 RX ORDER — LANSOPRAZOLE 30 MG/1
30 CAPSULE, DELAYED RELEASE ORAL DAILY
Qty: 30 CAPSULE | Refills: 2 | Status: SHIPPED | OUTPATIENT
Start: 2024-02-29 | End: 2024-05-24

## 2024-02-29 RX ORDER — EZETIMIBE 10 MG/1
10 TABLET ORAL DAILY
Qty: 90 TABLET | Refills: 2 | Status: SHIPPED | OUTPATIENT
Start: 2024-02-29 | End: 2024-11-25

## 2024-03-07 ENCOUNTER — LAB VISIT (OUTPATIENT)
Dept: LAB | Facility: HOSPITAL | Age: 64
End: 2024-03-07
Attending: INTERNAL MEDICINE
Payer: MEDICARE

## 2024-03-07 ENCOUNTER — ANTI-COAG VISIT (OUTPATIENT)
Dept: CARDIOLOGY | Facility: CLINIC | Age: 64
End: 2024-03-07
Payer: MEDICARE

## 2024-03-07 DIAGNOSIS — Z79.01 WARFARIN ANTICOAGULATION: ICD-10-CM

## 2024-03-07 DIAGNOSIS — I82.90 VTE (VENOUS THROMBOEMBOLISM): ICD-10-CM

## 2024-03-07 DIAGNOSIS — Z79.01 WARFARIN ANTICOAGULATION: Primary | ICD-10-CM

## 2024-03-07 DIAGNOSIS — I82.5Z2 CHRONIC DEEP VEIN THROMBOSIS (DVT) OF DISTAL VEIN OF LEFT LOWER EXTREMITY: ICD-10-CM

## 2024-03-07 DIAGNOSIS — E11.49 DIABETES MELLITUS TYPE 2 WITH NEUROLOGICAL MANIFESTATIONS: Chronic | ICD-10-CM

## 2024-03-07 DIAGNOSIS — Z79.01 LONG TERM (CURRENT) USE OF ANTICOAGULANTS: ICD-10-CM

## 2024-03-07 LAB
INR PPP: 3.5 (ref 0.8–1.2)
PROTHROMBIN TIME: 36.1 SEC (ref 9–12.5)

## 2024-03-07 PROCEDURE — 85610 PROTHROMBIN TIME: CPT | Performed by: INTERNAL MEDICINE

## 2024-03-07 PROCEDURE — 36415 COLL VENOUS BLD VENIPUNCTURE: CPT | Performed by: INTERNAL MEDICINE

## 2024-03-07 PROCEDURE — 93793 ANTICOAG MGMT PT WARFARIN: CPT | Mod: S$GLB,,,

## 2024-03-08 ENCOUNTER — PATIENT MESSAGE (OUTPATIENT)
Dept: CARDIOLOGY | Facility: CLINIC | Age: 64
End: 2024-03-08
Payer: MEDICARE

## 2024-03-08 RX ORDER — TIRZEPATIDE 2.5 MG/.5ML
2.5 INJECTION, SOLUTION SUBCUTANEOUS WEEKLY
Qty: 4 PEN | Refills: 3 | Status: SHIPPED | OUTPATIENT
Start: 2024-03-08 | End: 2024-03-20 | Stop reason: SDUPTHER

## 2024-03-08 NOTE — PROGRESS NOTES
[facility]    03/08/2024 2:28 PM    Assessment/Plan:    Patient presents today with supratherapeutic INR.    Assessment of patient findings and chart review: pt reports changing diet and intentional weight loss    Recommendation for patient's warfarin regimen: Hold dose today then decrease maintenance dose    Recommend repeat INR in 1 week  _________________________________________________________________    Mirna Quinteros (63 y.o.) is followed by the [facility].    Anticoagulation Summary  As of 3/7/2024      INR goal:  2.0-2.5   TTR:  15.4 % (2.8 mo)   INR used for dosing:  3.5 (3/7/2024)   Warfarin maintenance plan:  3.75 mg (7.5 mg x 0.5) every Mon, Wed, Fri; 7.5 mg (7.5 mg x 1) all other days   Weekly warfarin total:  41.25 mg   Plan last modified:  Evon Sarmiento, PharmD (3/8/2024)   Next INR check:  3/14/2024   Target end date:      Indications    Warfarin anticoagulation [Z79.01]  Long term (current) use of anticoagulants [Z79.01]  Chronic deep vein thrombosis (DVT) of distal vein of left lower extremity [I82.5Z2]                 Anticoagulation Episode Summary       INR check location:      Preferred lab:      Send INR reminders to:  ANTOINE CABRERA PROVIDER    Comments:  [facility] [place] Phone   [phone] Fax   [phone]          Anticoagulation Care Providers       Provider Role Specialty Phone number    Emil Burgess MD [facility] Medical Oncology [phone]            Patient Findings       Positives:  Other complaints    Negatives:  Signs/symptoms of bleeding, Change in health, Change in alcohol use, Change in activity, Upcoming invasive procedure, Emergency department visit, Upcoming dental procedure, Missed doses, Extra doses, Change in medications, Change in diet/appetite, Hospital admission, Bruising    Comments:  Patient confirmed correct warfarin dosing per calendar. Correct tablet size  and color was confirmed  Has changed eating habits and is losing weight

## 2024-03-11 ENCOUNTER — TELEPHONE (OUTPATIENT)
Dept: GASTROENTEROLOGY | Facility: CLINIC | Age: 64
End: 2024-03-11
Payer: MEDICARE

## 2024-03-11 NOTE — TELEPHONE ENCOUNTER
----- Message from Evon Sarmiento PharmD sent at 3/11/2024  7:59 AM CDT -----  Regarding: RE: BLOOD THINNER CLEARANCE  Good morning,    Pt cleared to hold warfarin prior to colonoscopy, we will coordinate this for the patient as the date approaches.     If you have any additional questions, please let me know.  Thanks,  Evon   ----- Message -----  From: Paradise Stearns LPN  Sent: 3/11/2024  12:00 AM CDT  To: [facility] Coumadin Monitoring Pool  Subject: BLOOD THINNER CLEARANCE                          Good morning, Ms. Quinteros is scheduled for a colonoscopy on 4/9 with Dr. Abbie Still. We are requesting clearance to hold her coumadin 3-5 days prior to her procedure. Please advise.

## 2024-03-14 ENCOUNTER — ANTI-COAG VISIT (OUTPATIENT)
Dept: CARDIOLOGY | Facility: CLINIC | Age: 64
End: 2024-03-14
Payer: MEDICARE

## 2024-03-14 ENCOUNTER — LAB VISIT (OUTPATIENT)
Dept: LAB | Facility: HOSPITAL | Age: 64
End: 2024-03-14
Attending: INTERNAL MEDICINE
Payer: MEDICARE

## 2024-03-14 DIAGNOSIS — Z79.01 WARFARIN ANTICOAGULATION: Primary | ICD-10-CM

## 2024-03-14 DIAGNOSIS — Z79.01 WARFARIN ANTICOAGULATION: ICD-10-CM

## 2024-03-14 DIAGNOSIS — I82.90 VTE (VENOUS THROMBOEMBOLISM): ICD-10-CM

## 2024-03-14 DIAGNOSIS — Z79.01 LONG TERM (CURRENT) USE OF ANTICOAGULANTS: ICD-10-CM

## 2024-03-14 DIAGNOSIS — I82.5Z2 CHRONIC DEEP VEIN THROMBOSIS (DVT) OF DISTAL VEIN OF LEFT LOWER EXTREMITY: ICD-10-CM

## 2024-03-14 LAB
INR PPP: 1.6 (ref 0.8–1.2)
PROTHROMBIN TIME: 17.8 SEC (ref 9–12.5)

## 2024-03-14 PROCEDURE — 93793 ANTICOAG MGMT PT WARFARIN: CPT | Mod: S$GLB,,,

## 2024-03-14 PROCEDURE — 36415 COLL VENOUS BLD VENIPUNCTURE: CPT | Performed by: INTERNAL MEDICINE

## 2024-03-14 PROCEDURE — 85610 PROTHROMBIN TIME: CPT | Performed by: INTERNAL MEDICINE

## 2024-03-14 NOTE — PROGRESS NOTES
[facility]    2024 11:51 AM    Assessment/Plan:    Patient presents today with subtherapeutic  INR.    Recommendation for patient's warfarin regimen: Boost dose today to 11.25mg then resume current maintenance dose    Recommend repeat INR in 1.5 weeks  _________________________________________________________________    Mirna Quinteros (63 y.o.) is followed by the [facility].    Anticoagulation Summary  As of 3/14/2024      INR goal:  2.0-2.5   TTR:  16.2 % (3 mo)   INR used for dosin.6 (3/14/2024)   Warfarin maintenance plan:  3.75 mg (7.5 mg x 0.5) every Mon, Fri; 7.5 mg (7.5 mg x 1) all other days   Weekly warfarin total:  45 mg   Plan last modified:  Evon Sarmiento, PharmD (3/14/2024)   Next INR check:  3/25/2024   Target end date:      Indications    Warfarin anticoagulation [Z79.01]  Long term (current) use of anticoagulants [Z79.01]  Chronic deep vein thrombosis (DVT) of distal vein of left lower extremity [I82.5Z2]                 Anticoagulation Episode Summary       INR check location:  Anticoagulation Clinic    Preferred lab:      Send INR reminders to:  [facility] COUMADIN MONITORING POOL    Comments:  [facility] [place] Phone   [phone] Fax   [phone]          Anticoagulation Care Providers       Provider Role Specialty Phone number    Emil Burgess MD [facility] Medical Oncology [phone]            Patient Findings       Negatives:  Signs/symptoms of bleeding, Change in health, Change in alcohol use, Upcoming invasive procedure, Emergency department visit, Upcoming dental procedure, Missed doses, Extra doses, Change in medications, Change in diet/appetite, Hospital admission, Bruising    Comments:  Patient confirmed correct warfarin dosing per calendar. Correct tablet size and color was confirmed  No changes to report

## 2024-03-20 ENCOUNTER — OFFICE VISIT (OUTPATIENT)
Dept: HEMATOLOGY/ONCOLOGY | Facility: CLINIC | Age: 64
End: 2024-03-20
Payer: MEDICARE

## 2024-03-20 ENCOUNTER — TELEPHONE (OUTPATIENT)
Dept: HEMATOLOGY/ONCOLOGY | Facility: CLINIC | Age: 64
End: 2024-03-20
Payer: MEDICARE

## 2024-03-20 DIAGNOSIS — I82.90 VTE (VENOUS THROMBOEMBOLISM): ICD-10-CM

## 2024-03-20 DIAGNOSIS — E11.49 DIABETES MELLITUS TYPE 2 WITH NEUROLOGICAL MANIFESTATIONS: Chronic | ICD-10-CM

## 2024-03-20 DIAGNOSIS — C57.00 FALLOPIAN TUBE CANCER, CARCINOMA, UNSPECIFIED LATERALITY: Primary | ICD-10-CM

## 2024-03-20 PROCEDURE — 1157F ADVNC CARE PLAN IN RCRD: CPT | Mod: CPTII,95,, | Performed by: INTERNAL MEDICINE

## 2024-03-20 PROCEDURE — 99214 OFFICE O/P EST MOD 30 MIN: CPT | Mod: 95,,, | Performed by: INTERNAL MEDICINE

## 2024-03-20 RX ORDER — WARFARIN 7.5 MG/1
TABLET ORAL
Qty: 60 TABLET | Refills: 3 | Status: SHIPPED | OUTPATIENT
Start: 2024-03-20 | End: 2024-04-16 | Stop reason: SDUPTHER

## 2024-03-20 RX ORDER — TIRZEPATIDE 2.5 MG/.5ML
2.5 INJECTION, SOLUTION SUBCUTANEOUS WEEKLY
Qty: 4 PEN | Refills: 3 | Status: SHIPPED | OUTPATIENT
Start: 2024-03-20 | End: 2024-05-29

## 2024-03-20 NOTE — NURSING
RN Cecilio lvm for patient w/return contact information to assist in scheduling dietitian Vishnu hernandez RN.

## 2024-03-20 NOTE — PROGRESS NOTES
PATIENT: Mirna Quinteros  MRN: [id number]  DATE: 3/20/2024    The patient location is: [place]  The chief complaint leading to consultation is: follow-up VTE    Visit type: audiovisual    Face to Face time with patient: 12 min      Each patient to whom he or she provides medical services by telemedicine is:  (1) informed of the relationship between the physician and patient and the respective role of any other health care provider with respect to management of the patient; and (2) notified that he or she may decline to receive medical services by telemedicine and may withdraw from such care at any time.    Notes:       Subjective:     Chief complaint:  Chief Complaint   Patient presents with    VTE    Follow-up       Oncologic History:  Fallopian tube carcinoma (HCC)   8/21/2021 - Hospital Admission   To ED with abdominal pain    8/21/2021 Imaging   CT A/P  1. Findings consistent with peritoneal carcinomatosis.  2. Mild pelvic ascites.  3. No other acute process    8/21/2021 Other   Component  Latest Ref Rng & Units 8/21/2021   CEA  0.00 - 2.50 ng/mL 1.09   CA 19-9  0.0 - 40.0 U/mL 19.3     0.0 - 35.0 U/mL 144.0 (H)     8/22/2021 Imaging   CT Chest  1. No findings of thoracic metastatic disease.  2. Bilateral lower lung atelectasis.  3. No thoracic adenopathy.    8/23/2021 Biopsy   Diagnostic Lap - Dr. Jordan    A. Abdominal mass #1, biopsy:   Metastatic carcinoma; see comment.     B. Abdominal mass, biopsy:   Metastatic carcinoma; see comment.     C. Abdominal mass #2, biopsy:   Fibroconnective tissue with rare atypical cells.   Additional levels reviewed.     D. Abdominal mass #3, biopsy:   Metastatic carcinoma, compatible with high grade serous carcinoma.   See diagnostic comment.     E. Liver, segment 3, biopsy:   Hepatic parenchyma with scattered chronic inflammation, fibrosis, and rare atypical cells.   Additional levels reviewed.     8/26/2021 Imaging   Pelvic U/S  1. Fibroid uterus. Normal endometrial stripe  thickness.  2. Nonvisualization of the ovaries.  3. Small amount of free fluid in the pelvis.    8/27/2021 - Chemotherapy   CARBOplatin AUC 6 / PACLitaxel 175mg/m2    C1 - In H    C2 - Taxol to 135mg/m2 due to neuropathy    C3 - Held due to Covid +, hospital admission follow due to bacteremia  Decrease Carbo dose to AUC 4 due to performance status    10/5/2021 Imaging   CT A/P (in ED due to nausea)  1. Normal CT appearance of the appendix. No acute process.  2. Decreased stranding in the omental fat suggesting response to treatment.  3. No lymphadenopathy.  4. Constipation. No bowel obstruction.  5. Bilateral airspace disease suggesting atypical pneumonia.  6. Prior cholecystectomy.    10/6/2021 - 10/8/2021 Hospital Admission   Covid pneumonia    10/14/2021 - Hospital Admission   TO ED due to fever    Treated    10/20/2021 Imaging   CT Chest  1. Interval removal of the right IJ port. There is a small hematoma at the site of port removal within the subcutaneous soft tissues of the anterior abdominal wall measuring 2.7 x 1.6 cm. Additionally, there is a filling defect within the right IJ/SVC measuring approximately 1.7 cm in length compatible with fibrin sheath versus thrombus.  2. Increasing bilateral peripheral somewhat wedge-shaped opacities within both lungs. Differential considerations include septic emboli, atypical/viral pneumonia, and pulmonary infarcts. Central pulmonary arteries appear patent    11/12/2021 Imaging   CT C/A/P (s/p 3 cycles C/T)  CHEST:  Moderate improvement in previously seen ill-defined parenchymal opacities with residual mild-to-moderate linear densities some of which have nodular thickening particularly in the left lung. Residual densities may represent residual inflammation/infection or areas of persistent post infectious scarring. A few of the more nodular components described in the left lung in the body of the report require continued follow-up.    0.3 cm subpleural right upper lobe  lung nodule stable. Continued attention on follow-up imaging.    No lymphadenopathy.    ABDOMEN/PELVIS:  No convincing evidence of new metastatic disease.    No lymphadenopathy.    Very mild residual stranding seen within the omentum without nodularity.    No ascites.    Nonspecific mild varicosities involving the lower anterior abdominal wall within the subcutaneous fat, stable.    12/13/2021 Surgery   Debulking, MARCELO, BSO, omentectomy, HIPEC, partial hepatectomy    BILATERAL FALLOPIAN TUBES: HIGH-GRADE SEROUS CARCINOMA     Procedure: Total hysterectomy and bilateral salpingo-oophorectomy   Specimen integrity: Right ovary - Capsule intact  Left ovary - Capsule intact  Right fallopian tube - Serosa intact  Left fallopian tube - Serosa intact   Tumor site: Bilateral fallopian tubes   Tumor size: See comment   Histologic type: High grade serous carcinoma   Histologic grade: High grade   Ovarian surface involvement: Not identified   Fallopian tube surface involvement: Not identified   Implants (for borderline tumors only): Not applicable   Other tissue/ organ involvement: Not identified   Largest extrapelvic peritoneal focus: Not applicable   Peritoneal/Ascitic fluid involvement: Not submitted/unknown   Chemotherapy response score (CRS): CRS3 (marked response with no or minimal residual cancer)   Regional lymph node status: All regional lymph nodes negative for tumor cells   No. of nodes with metastasis >10 mm: Not applicable   No. of nodes with metastasis <10 mm (excluding isolated tumor cells): Not applicable   No. of nodes with isolated tumor cells (0.2 mm or less): Not applicable   Number of lymph nodes examined: 1   Distant metastasis: Not applicable   AJCC 8th edition pathologic stage: pT1b, pN0,   pM not applicable - pM cannot be determined from the submitted specimen(s)  TNM Descriptors: y (posttreatment     Discharged 1/11/22. Extended hospital stay.     3/9/2022 Cancer Staged   Staging form: Ovary, Fallopian  Tube, And Primary Peritoneal Carcinoma, AJCC 8th Edition  - Pathologic: FIGO Stage IIIC (pT3c, cM0) - Signed by Casimiro Forrester MD on 3/9/2022    3/21/2022 Imaging   CT C/A/P  1. No focal consolidation or nodule in the lung. Minimal subsegmental atelectasis/scarring.  2. Postsurgical changes in the liver, pelvis and along the laparotomy scar. Small amount of fluid with air in the laparotomy scar could be due to an open wound, recommend clinical correlation.    4/1/2022 - Chemotherapy   Zejula 300mg daily    4/6 - Hold due to nausea and fatigue    4/11 - Plan to restart at 200mg daily    4/27 - Hold Zejula due to thrombocytopenia, neutropenia - Zejula not held until 4/29 5/18 - Okay to resume Zejula at 100mg daily    4/6/2022 Other   Genetic testing - Ms. Quinteros chose to proceed with [employer] xG panel.    5/9/2022 Imaging   CT A/P ( due to abdominal pain)   1. Postoperative changes again seen from a hysterectomy, bilateral salpingo-oophorectomy, omentectomy, and appendectomy. Residual infiltrative changes in the midline subcutaneous tissues again seen, compatible with postoperative scarring/granulation tissue. A chronic deep tract of gas and trace fluid along the abdominal wall musculature is not significantly changed. No enlarging or new fluid collections are identified in the anterior abdomino-pelvic wall.  2. No lymphadenopathy or evidence of metastatic disease in the abdomen or pelvis.  3. Persistent moderate linear atelectasis/scarring in the visualized lung bases, not significantly improved. If there are persistent/developing pulmonary symptoms, consider a follow-up CT chest.  6/7/2023: CT Chest- No evidence of metastatic disease in the chest. CT Abdomen & Pelvis- No evidence of recurrent or metastatic disease.            Interval History: Ms. Quinteros participates remotely in telemedicine follow-up today.   She is doing well. She is on warfarin 7.5 every day except Wednesday. She is tolerating that well. She is  requesting refills on mounjaro and warfarin. No acute issues/complaints at this time.       Review of Systems   A comprehensive review of systems was performed; pertinent positives and negatives are noted in the HPI.        ECOG Performance Status:   ECOG SCORE    1 - Restricted in strenuous activity-ambulatory and able to carry out work of a light nature         Objective:      Vitals: There were no vitals filed for this visit.  BMI: There is no height or weight on file to calculate BMI.      Physical Exam:   No physical exam due to remote nature of the visit.        Laboratory Data:  WBC   Date Value Ref Range Status   01/11/2024 4.07 3.90 - 12.70 K/uL Final     Hemoglobin   Date Value Ref Range Status   01/11/2024 11.7 (L) 12.0 - 16.0 g/dL Final     Hematocrit   Date Value Ref Range Status   01/11/2024 34.4 (L) 37.0 - 48.5 % Final     Platelets   Date Value Ref Range Status   01/11/2024 202 150 - 450 K/uL Final     Gran # (ANC)   Date Value Ref Range Status   01/11/2024 2.3 1.8 - 7.7 K/uL Final     Comment:     The ANC is based on a white cell differential from an   automated cell counter. It has not been microscopically   reviewed for the presence of abnormal cells. Clinical   correlation is required.         Chemistry        Component Value Date/Time     01/11/2024 1151    K 4.0 01/11/2024 1151     01/11/2024 1151    CO2 27 01/11/2024 1151    BUN 12 01/11/2024 1151    CREATININE 0.8 01/11/2024 1151     (H) 01/11/2024 1151        Component Value Date/Time    CALCIUM 9.0 01/11/2024 1151    ALKPHOS 75 01/11/2024 1151    AST 14 01/11/2024 1151    ALT 12 01/11/2024 1151    BILITOT 0.5 01/11/2024 1151    ESTGFRAFRICA >60 02/05/2016 1318    EGFRNONAA >60 02/05/2016 1318              Assessment/Plan:     1. Fallopian tube cancer, carcinoma, unspecified laterality    2. VTE (venous thromboembolism)    3. Diabetes mellitus type 2 with neurological manifestations      ERIKA. Follows with Dr. Hanson. Will  repeat CA-125 in 6 mo.  Continue warfarin; following with warfarin clinic. Refill at her request.  Refill per patient request.     Med and Orders:  Orders Placed This Encounter    CBC auto differential    CMP        Ambulatory referral/consult to Hematology/Oncology/Nutrition    MOUNJARO 2.5 mg/0.5 mL PnIj    warfarin (COUMADIN) 7.5 MG tablet       Follow Up:  Follow up in about 6 months (around 9/20/2024).      Above care plan was discussed with patient and all questions were addressed to their expressed satisfaction.       Emil Burgess MD, FACP  Hematology & Medical Oncology  [facility]

## 2024-03-21 ENCOUNTER — PATIENT MESSAGE (OUTPATIENT)
Dept: HEMATOLOGY/ONCOLOGY | Facility: CLINIC | Age: 64
End: 2024-03-21
Payer: MEDICARE

## 2024-03-26 ENCOUNTER — LAB VISIT (OUTPATIENT)
Dept: LAB | Facility: HOSPITAL | Age: 64
End: 2024-03-26
Attending: INTERNAL MEDICINE
Payer: MEDICARE

## 2024-03-26 ENCOUNTER — TELEPHONE (OUTPATIENT)
Dept: GYNECOLOGIC ONCOLOGY | Facility: CLINIC | Age: 64
End: 2024-03-26
Payer: MEDICARE

## 2024-03-26 ENCOUNTER — ANTI-COAG VISIT (OUTPATIENT)
Dept: CARDIOLOGY | Facility: CLINIC | Age: 64
End: 2024-03-26
Payer: MEDICARE

## 2024-03-26 ENCOUNTER — PATIENT MESSAGE (OUTPATIENT)
Dept: CARDIOLOGY | Facility: CLINIC | Age: 64
End: 2024-03-26

## 2024-03-26 DIAGNOSIS — Z79.01 WARFARIN ANTICOAGULATION: Primary | ICD-10-CM

## 2024-03-26 DIAGNOSIS — Z79.01 LONG TERM (CURRENT) USE OF ANTICOAGULANTS: ICD-10-CM

## 2024-03-26 DIAGNOSIS — I82.5Z2 CHRONIC DEEP VEIN THROMBOSIS (DVT) OF DISTAL VEIN OF LEFT LOWER EXTREMITY: ICD-10-CM

## 2024-03-26 DIAGNOSIS — I82.90 VTE (VENOUS THROMBOEMBOLISM): ICD-10-CM

## 2024-03-26 DIAGNOSIS — Z79.01 WARFARIN ANTICOAGULATION: ICD-10-CM

## 2024-03-26 LAB
INR PPP: 3.6 (ref 0.8–1.2)
PROTHROMBIN TIME: 37.2 SEC (ref 9–12.5)

## 2024-03-26 PROCEDURE — 36415 COLL VENOUS BLD VENIPUNCTURE: CPT | Performed by: INTERNAL MEDICINE

## 2024-03-26 PROCEDURE — 93793 ANTICOAG MGMT PT WARFARIN: CPT | Mod: S$GLB,,,

## 2024-03-26 PROCEDURE — 85610 PROTHROMBIN TIME: CPT | Performed by: INTERNAL MEDICINE

## 2024-03-26 NOTE — PROGRESS NOTES
[facility]    03/26/2024 10:49 AM    Mirna Quinteros (63 y.o.) is followed by the [email or web address].      Assessment/Plan:    Mirna Quinteros presents today with supratherapeutic INR. Goal INR  2.0-2.5    Assessment of patient findings per MA/LPN and chart review:   The following significant findings were found:  Patient confirmed she was told to take 0 mg on Wednesday and all other dosing correct per calendar.   No changes to diet or mediations confirmed and no other issues noted.     Recommendation for patient's warfarin regimen:   Due to supratherapeutic INR, patient was instructed to SKIP their next 1 warfarin dose but will resume their normal weekly dose.    Recommended repeat INR in 1 week      Felicity Armijo, PharmD  PGY1 Pharmacy Resident  Preferred Contact: Secure Messaging or In Basket Message

## 2024-03-26 NOTE — TELEPHONE ENCOUNTER
Pt confirmed appt 04/16/24 9:00 AM with Dr. Hanson at [facility] gyn-onc location. Pt verbalized understanding.       ----- Message from Aspen Hanson MD sent at 3/26/2024  1:33 PM CDT -----  Regarding: missed follow up  Patient missed her follow up with me.   Please reach out to reschedule.   Thank you.

## 2024-03-26 NOTE — PROGRESS NOTES
Patient states that she will be out of town and will not be able to go to the lab on 4/2/24. Will be able to go on 4/4/24. Please advise

## 2024-04-05 ENCOUNTER — OFFICE VISIT (OUTPATIENT)
Dept: PALLIATIVE MEDICINE | Facility: CLINIC | Age: 64
End: 2024-04-05
Payer: MEDICARE

## 2024-04-05 ENCOUNTER — LAB VISIT (OUTPATIENT)
Dept: LAB | Facility: HOSPITAL | Age: 64
End: 2024-04-05
Attending: INTERNAL MEDICINE
Payer: MEDICARE

## 2024-04-05 ENCOUNTER — TELEPHONE (OUTPATIENT)
Dept: HEMATOLOGY/ONCOLOGY | Facility: CLINIC | Age: 64
End: 2024-04-05
Payer: MEDICARE

## 2024-04-05 ENCOUNTER — ANTI-COAG VISIT (OUTPATIENT)
Dept: CARDIOLOGY | Facility: CLINIC | Age: 64
End: 2024-04-05
Payer: MEDICARE

## 2024-04-05 ENCOUNTER — PATIENT MESSAGE (OUTPATIENT)
Dept: PALLIATIVE MEDICINE | Facility: CLINIC | Age: 64
End: 2024-04-05

## 2024-04-05 DIAGNOSIS — G89.3 CANCER ASSOCIATED PAIN: ICD-10-CM

## 2024-04-05 DIAGNOSIS — G89.4 CHRONIC PAIN SYNDROME: ICD-10-CM

## 2024-04-05 DIAGNOSIS — Z79.01 WARFARIN ANTICOAGULATION: ICD-10-CM

## 2024-04-05 DIAGNOSIS — Z79.01 LONG TERM (CURRENT) USE OF ANTICOAGULANTS: ICD-10-CM

## 2024-04-05 DIAGNOSIS — K59.00 CONSTIPATION, UNSPECIFIED CONSTIPATION TYPE: ICD-10-CM

## 2024-04-05 DIAGNOSIS — R63.0 ANOREXIA: ICD-10-CM

## 2024-04-05 DIAGNOSIS — G89.3 CANCER RELATED PAIN: ICD-10-CM

## 2024-04-05 DIAGNOSIS — C57.00 FALLOPIAN TUBE CANCER, CARCINOMA, UNSPECIFIED LATERALITY: ICD-10-CM

## 2024-04-05 DIAGNOSIS — I82.90 VTE (VENOUS THROMBOEMBOLISM): ICD-10-CM

## 2024-04-05 DIAGNOSIS — Z79.01 WARFARIN ANTICOAGULATION: Primary | ICD-10-CM

## 2024-04-05 DIAGNOSIS — Z51.5 ENCOUNTER FOR PALLIATIVE CARE: Primary | ICD-10-CM

## 2024-04-05 DIAGNOSIS — I82.5Z2 CHRONIC DEEP VEIN THROMBOSIS (DVT) OF DISTAL VEIN OF LEFT LOWER EXTREMITY: ICD-10-CM

## 2024-04-05 DIAGNOSIS — G47.00 INSOMNIA, UNSPECIFIED TYPE: ICD-10-CM

## 2024-04-05 DIAGNOSIS — F43.21 ADJUSTMENT DISORDER WITH DEPRESSED MOOD: ICD-10-CM

## 2024-04-05 DIAGNOSIS — R63.0 POOR APPETITE: ICD-10-CM

## 2024-04-05 DIAGNOSIS — R53.83 FATIGUE, UNSPECIFIED TYPE: ICD-10-CM

## 2024-04-05 LAB
INR PPP: 1.3 (ref 0.8–1.2)
PROTHROMBIN TIME: 13.8 SEC (ref 9–12.5)

## 2024-04-05 PROCEDURE — 85610 PROTHROMBIN TIME: CPT | Performed by: INTERNAL MEDICINE

## 2024-04-05 PROCEDURE — 93793 ANTICOAG MGMT PT WARFARIN: CPT | Mod: S$GLB,,,

## 2024-04-05 PROCEDURE — 1123F ACP DISCUSS/DSCN MKR DOCD: CPT | Mod: CPTII,95,, | Performed by: NURSE PRACTITIONER

## 2024-04-05 PROCEDURE — 1125F AMNT PAIN NOTED PAIN PRSNT: CPT | Mod: CPTII,95,, | Performed by: NURSE PRACTITIONER

## 2024-04-05 PROCEDURE — 99215 OFFICE O/P EST HI 40 MIN: CPT | Mod: 95,,, | Performed by: NURSE PRACTITIONER

## 2024-04-05 PROCEDURE — 1159F MED LIST DOCD IN RCRD: CPT | Mod: CPTII,95,, | Performed by: NURSE PRACTITIONER

## 2024-04-05 PROCEDURE — 36415 COLL VENOUS BLD VENIPUNCTURE: CPT | Performed by: INTERNAL MEDICINE

## 2024-04-05 RX ORDER — OXYCODONE AND ACETAMINOPHEN 7.5; 325 MG/1; MG/1
1 TABLET ORAL EVERY 6 HOURS PRN
Qty: 120 TABLET | Refills: 0 | Status: SHIPPED | OUTPATIENT
Start: 2024-04-05 | End: 2024-05-13 | Stop reason: SDUPTHER

## 2024-04-05 RX ORDER — MORPHINE SULFATE 30 MG/1
30 TABLET, FILM COATED, EXTENDED RELEASE ORAL 2 TIMES DAILY
Qty: 60 TABLET | Refills: 0 | OUTPATIENT
Start: 2024-04-05 | End: 2024-05-05

## 2024-04-05 RX ORDER — DRONABINOL 5 MG/1
5 CAPSULE ORAL 2 TIMES DAILY
Qty: 60 CAPSULE | Refills: 0 | OUTPATIENT
Start: 2024-04-05 | End: 2024-05-05

## 2024-04-05 RX ORDER — MORPHINE SULFATE 15 MG/1
15 TABLET, FILM COATED, EXTENDED RELEASE ORAL 2 TIMES DAILY
Qty: 60 TABLET | Refills: 0 | Status: SHIPPED | OUTPATIENT
Start: 2024-04-05 | End: 2024-04-22 | Stop reason: SDUPTHER

## 2024-04-05 RX ORDER — DRONABINOL 10 MG/1
10 CAPSULE ORAL
Qty: 60 CAPSULE | Refills: 0 | Status: SHIPPED | OUTPATIENT
Start: 2024-04-05 | End: 2024-05-05

## 2024-04-05 NOTE — PROGRESS NOTES
INR currently in process, pt rescheduled INR appt multiple times this week. Plan to hold warfarin x 4 days starting today 4/5 through 4/8 for cscope on 4/9/24. Patient advised to resume coumadin on day of colonoscopy unless directed otherwise by procedural MD. Patient advised that coumadin may not be resumed if polyps removed or biopsies taken. Patient advised to call if coumadin not resumed as planned or any signs/symptoms of bleeding.

## 2024-04-05 NOTE — TELEPHONE ENCOUNTER
----- Message from Kasie Cifuentes sent at 4/5/2024 11:03 AM CDT -----  Type:  Patient Returning Call    Who Called:pt  Does the patient know what this is regarding?:Ensure  Would the patient rather a call back or a response via [facility]? call  Best Call Back Number: [phone]  Additional Information: Pt's daughter is on her way to pickup the Ensure.

## 2024-04-05 NOTE — PROGRESS NOTES
"The patient location is: [place]  The chief complaint leading to consultation is: symptom mgmt    Visit type: audiovisual    Face to Face time with patient: 30 minutes     40 minutes minutes of total time spent on the encounter, which includes face to face time and non-face to face time preparing to see the patient (eg, review of tests), Obtaining and/or reviewing separately obtained history, Documenting clinical information in the electronic or other health record, Independently interpreting results (not separately reported) and communicating results to the patient/family/caregiver, or Care coordination (not separately reported).       Each patient to whom he or she provides medical services by telemedicine is:  (1) informed of the relationship between the physician and patient and the respective role of any other health care provider with respect to management of the patient; and (2) notified that he or she may decline to receive medical services by telemedicine and may withdraw from such care at any time.    Notes:     Consult Note  Palliative Care      Consult Requested By: No ref. provider found  Reason for Consult: symptom mgmt/ACP       ASSESSMENT/PLAN:     Plan/Recommendations:    04/05/2024:  - increased dronabinol to 10 mg bid prn  - refilled percocet 7.5 mg q 6 hrs prn  - refilled ms contin 15 mg q 12 hours     Fallopian tube carcinoma  - ID 8/2021  - 12/2021 debulking, total hysterectomy and bilateral salpingo-oophorectomy   - following w Dr. Aditya JAMES    Encounter for palliative  - Patient is decisional  - Patient accompanied today by daughter   - HCPOA uploaded into EMR on 2/12/24  - Philosophy of Palliative Medicine reviewed with patient and family at first visit  - New patient folder given to and reviewed with patient and family at first visit  - Goals of care: To "feel normal again". Tolerable symptoms, improved pain.     Cancer pain   - describes as stomach pain "grinding, like something moving" " "  - currently on MS Contin 15 mg q 12 hour with percocet 7.5 mg, has been using ~ 3 tabs percocet daily  - narcan available  - opioid safety sheet given at first visit  - also has tizanidine  - will continue to monitor     Adjustment disorder with anxiety and depression   - tearful throughout initial visit  - irritable at times  - wants to feel normal and do the things she used to do  - she feels badly for the burden she imposes on her daughter, who had to move in with her to help with her care  - declines mental health counseling   - cont zoloft 100 mg daily  - emotional support provided  - pall care MSW present at initial visit  - will continue to monitor     Anorexia  - dronabinol 10 mg BID before meals    - remeron 15 mg qhs     Constipation  - BM sometimes once daily but feels incomplete  - following with GI, per note: "rec daily exercise as tolerated, adequate water intake (six 8-oz glasses of water daily), and high fiber diet. OTC fiber supplements are recommended if diet does not reach daily fiber goal (20-30 grams daily), such as Metamucil, Citrucel, or FiberCon (take as directed, separate from other oral medications by >2 hours). 01/31/2024 - START: linaCLOtide (LINZESS) 72 mcg Cap capsule; Take 1 capsule (72 mcg total) by mouth before breakfast."  - lizness, no relief   - pending colonoscopy   - previously on miralax, request refill which is given at initial visit   - endorses good hydration     Insomnia/fatigue  - cont remeron 15 mg qhs    Understanding of illness/Prognosis: fair    Follow up: 12 weeks    Patient's encounter and above plan of care discussed with patient's oncology team.     SUBJECTIVE:     History of Present Illness:  Patient is a 63 y.o. year old female presenting with fallopian tube carcinoma. Please see oncology notes for full oncologic history and treatment course.     04/05/2024:  [place]  reviewed    Patient presents to telehealth visit with her daughter. She is pleasant and appears " to be in good spirits. Her appetite is still poor, increased dronabinol to 10 mg. Pain has been well-controlled, regimen refilled today. Patient is anticipating upcoming colonoscopy, not yet scheduled. She is hopeful the procedure will identify source of BM symptoms. Scheduled to f/u with Dr. Hanson on 4/16.    02/12/2024:  [place]  reviewed    Patient presents to initial visit with her daughter and grandchildren. Her daughter is initially standoffish and brusk but eventually patient daughter are tearful throughout visit. Patient is in severe pain today due to very recent oral surgery. Her cancer related pain is severe and feels like something is moving inside of her, morphine helps but not enough to make her comfortable. Her goal is to feel normal again; she is currently ERIKA but feels unwell much of the time. She has been caring for her son's children for many years however now that she is sick, her adult daughter has had to put her life on hold and move in with, and help care for, her mother and nephews/nieces. Though they all seem to be struggling emotionally, they decline mental health support at this time, patient says she is thinking about it. RTC in 6-8 weeks.       Past Medical History:   Diagnosis Date    Arthritis     Asthma     C. difficile colitis     Depression     Diabetes mellitus, type 2     Diabetic neuropathy     DVT (deep venous thrombosis)     Enteritis due to Norovirus 02/22/2019    Feeding difficulty in adult 11/22/2021    Formatting of this note might be different from the original. Added automatically from request for surgery [id number] Last Assessment & Plan: Formatting of this note might be different from the original. · Patient with history of ovarian cancer s/p MARCELO/BSO, omentectomy, radical tumor debulking, cytoreduction, appendectomy, partial hepatectomy, and HIPEC per Dr. Forrester and Dr. Jordan 12/13 with postope    GERD (gastroesophageal reflux disease)     Hyperlipidemia     Hypertension      "Internal hemorrhoids     Pancreatitis 09/23/2018    Last Assessment & Plan: Formatting of this note might be different from the original. Mirna is a  58 year old female presenting with abdominal pain.  CT abd/pevis w/o contrast revealed acute pancreatitis involving pancreatic head with no fluid collections/necrosis.  EGD from 9/24/2018 unremarkable. Assessment: Today patient is sitting up and interactive.  No acute stress obvious.   Reports pain in    Pulmonary embolism     Respiratory failure with hypoxia 10/06/2021    Rheumatoid arthritis(714.0)     Right upper quadrant pain 08/22/2021    SOB (shortness of breath) 02/24/2023     Past Surgical History:   Procedure Laterality Date    CARPAL TUNNEL RELEASE Left     CHOLECYSTECTOMY      COLONOSCOPY      about 10 years ago    KNEE ARTHROSCOPY      KNEE SURGERY      TUBAL LIGATION      UPPER GASTROINTESTINAL ENDOSCOPY       Family History   Problem Relation Age of Onset    Heart disease Mother     Hypertension Mother     Diabetes Father     Stroke Father     Hypertension Father     Stroke Sister     Stroke Maternal Aunt     Stroke Maternal Aunt     Breast cancer Neg Hx     Colon cancer Neg Hx     Ovarian cancer Neg Hx     Colon polyps Neg Hx     Esophageal cancer Neg Hx     Stomach cancer Neg Hx      Review of patient's allergies indicates:   Allergen Reactions    Liraglutide Other (See Comments)     pancreatitis    Empagliflozin      Other reaction(s): yeast infxn    Glyburide      Other reaction(s): unknown    Metformin      Other reaction(s): GI upset    Pioglitazone      Other reaction(s): Swelling    Iohexol Itching     Patient given 50mg benadryl im and itching subsided       Medications:    Current Outpatient Medications:     aspirin 81 mg Cap, Take 81 mg by mouth once daily., Disp: , Rfl:     BD INSULIN PEN NEEDLE UF MINI 31 x 3/16 " Ndle, , Disp: , Rfl: 0    ezetimibe (ZETIA) 10 mg tablet, Take 1 tablet (10 mg total) by mouth once daily., Disp: 90 tablet, Rfl: " 2    flash glucose sensor (FREESTYLE LUC 2 SENSOR) Kit, 1 each by Misc.(Non-Drug; Combo Route) route as needed., Disp: 1 kit, Rfl: 6    furosemide (LASIX) 40 MG tablet, Take 40 mg by mouth 2 (two) times a day., Disp: , Rfl:     hydrOXYzine HCL (ATARAX) 10 MG Tab, Take 10 mg by mouth 3 (three) times daily as needed., Disp: , Rfl:     insulin aspart (NOVOLOG FLEXPEN) 100 unit/mL InPn, Inject 8 Units into the skin 3 (three) times daily with meals., Disp: 1 Box, Rfl: 6    insulin degludec (TRESIBA FLEXTOUCH U-100) 100 unit/mL (3 mL) insulin pen, Inject 32 Units into the skin., Disp: , Rfl:     lansoprazole (PREVACID) 30 MG capsule, Take 1 capsule (30 mg total) by mouth once daily., Disp: 30 capsule, Rfl: 2    linaCLOtide (LINZESS) 72 mcg Cap capsule, Take 1 capsule (72 mcg total) by mouth before breakfast., Disp: 30 capsule, Rfl: 2    mirtazapine (REMERON) 15 MG tablet, Take 15 mg by mouth every evening., Disp: , Rfl:     montelukast (SINGULAIR) 10 mg tablet, Take 1 tablet (10 mg total) by mouth every evening., Disp: 90 tablet, Rfl: 1    MOUNJARO 2.5 mg/0.5 mL PnIj, Inject 2.5 mg into the skin once a week., Disp: 4 Pen, Rfl: 3    ondansetron (ZOFRAN) 4 MG tablet, Take 1 tablet (4 mg total) by mouth every 8 (eight) hours as needed (Nausea and vomiting)., Disp: 12 tablet, Rfl: 0    oxyCODONE-acetaminophen (PERCOCET) 7.5-325 mg per tablet, Take 1 tablet by mouth every 6 (six) hours as needed for Pain., Disp: 120 tablet, Rfl: 0    rosuvastatin (CRESTOR) 40 MG Tab, Take 20 mg by mouth once daily., Disp: , Rfl:     sertraline (ZOLOFT) 100 MG tablet, Take by mouth., Disp: , Rfl:     sumatriptan (IMITREX) 50 MG tablet, Take by mouth., Disp: , Rfl:     SYMBICORT 160-4.5 mcg/actuation HFAA, Inhale 2 puffs into the lungs every 12 (twelve) hours., Disp: , Rfl:     tiZANidine 4 mg Cap, Take 4 mg by mouth 2 (two) times daily as needed., Disp: , Rfl:     valsartan-hydrochlorothiazide (DIOVAN-HCT) 80-12.5 mg per tablet, Take 1 tablet  by mouth once daily., Disp: , Rfl:     warfarin (COUMADIN) 7.5 MG tablet, Mon, Tue, Thurs, Fri, Sat, Sun. (Daily EXCEPT Wednesday), Disp: 60 tablet, Rfl: 3    ZEJULA 100 mg Tab, Take 1 tablet by mouth every morning., Disp: , Rfl:     OBJECTIVE:       ROS:  Review of Systems   Constitutional:  Positive for activity change, appetite change, fatigue and unexpected weight change.   HENT:  Positive for dental problem.    Eyes:  Negative for pain, discharge and itching.   Respiratory:  Negative for cough, choking and shortness of breath.    Cardiovascular:  Negative for chest pain, palpitations and leg swelling.   Gastrointestinal:  Positive for abdominal pain and constipation. Negative for diarrhea, nausea and vomiting.   Endocrine: Negative for polydipsia, polyphagia and polyuria.   Genitourinary:  Negative for difficulty urinating, dyspareunia and dysuria.   Musculoskeletal:  Positive for arthralgias and gait problem.   Skin:  Negative for pallor, rash and wound.   Neurological:  Positive for weakness.   Psychiatric/Behavioral:  Positive for dysphoric mood and sleep disturbance.        Review of Symptoms      Symptom Assessment (ESAS 0-10 Scale)  Pain:  10  Dyspnea:  0  Anxiety:  0  Nausea:  0  Depression:  0  Anorexia:  3  Fatigue:  10  Insomnia:  6  Restlessness:  0  Agitation:  0     CAM / Delirium:  Negative  Constipation:  Positive  Diarrhea:  Negative    Constipation:  Constipation    Bowel Management Plan (BMP):  Yes      Pain Assessment:  OME in 24 hours:  22.5  Location(s): abdomen    Abdomen       Location: generalized        Quality: Cramping        Quantity: 9/10 in intensity        Chronicity: Onset 0 second(s) ago, unchanged        Aggravating Factors: None        Alleviating Factors: Opiates        Associated Symptoms: None    Modified Patrick Scale:  0    Psychosocial/Cultural:   See Palliative Psychosocial Note: Yes  **Primary  to Follow**  Palliative Care  Consult:  No      Advance Care Planning   Advance Directives:   Living Will: No    Medical Power of : Yes      Decision Making:  Patient answered questions  Goals of Care: What is most important right now is to focus on remaining as independent as possible, symptom/pain control, extending life as long as possible, even it it means sacrificing quality. Accordingly, we have decided that the best plan to meet the patient's goals includes continuing with treatment.            Physical Exam:  Vitals:    Telehealth visit      Physical Exam  Constitutional:       General: She is not in acute distress.     Appearance: She is not diaphoretic.   HENT:      Head: Normocephalic and atraumatic.      Right Ear: External ear normal.      Left Ear: External ear normal.      Nose: Nose normal.   Eyes:      General:         Right eye: No discharge.         Left eye: No discharge.      Extraocular Movements: Extraocular movements intact.      Conjunctiva/sclera: Conjunctivae normal.   Pulmonary:      Effort: Pulmonary effort is normal. No respiratory distress.      Breath sounds: No stridor.   Musculoskeletal:      Cervical back: Normal range of motion.   Skin:     General: Skin is warm and dry.      Coloration: Skin is not jaundiced.      Findings: No bruising.   Neurological:      Mental Status: She is alert and oriented to person, place, and time. Mental status is at baseline.   Psychiatric:         Attention and Perception: Attention normal.         Mood and Affect: Mood and affect normal.         Labs:  CBC:   WBC   Date Value Ref Range Status   01/11/2024 4.07 3.90 - 12.70 K/uL Final     Hemoglobin   Date Value Ref Range Status   01/11/2024 11.7 (L) 12.0 - 16.0 g/dL Final     Hematocrit   Date Value Ref Range Status   01/11/2024 34.4 (L) 37.0 - 48.5 % Final     MCV   Date Value Ref Range Status   01/11/2024 89 82 - 98 fL Final     Platelets   Date Value Ref Range Status   01/11/2024 202 150 - 450 K/uL Final       LFT:   Lab  Results   Component Value Date    AST 14 01/11/2024    ALKPHOS 75 01/11/2024    BILITOT 0.5 01/11/2024       Albumin:   Albumin   Date Value Ref Range Status   01/11/2024 4.1 3.5 - 5.2 g/dL Final     Protein:   Total Protein   Date Value Ref Range Status   01/11/2024 7.6 6.0 - 8.4 g/dL Final       Radiology:I have reviewed all pertinent imaging results/findings within the past 24 hours.    02/06/2024 CT AP: IMPRESSION:  No acute or significant abnormality seen in the abdomen or pelvis.  Prior cholecystectomy and hysterectomy     This exam was performed according to our departmental dose-optimization program which includes automated exposure control, adjustment of the mA and/or kV according to patient size and/or use of iterative reconstruction technique.      Signature: Chasity Morris, DNP

## 2024-04-08 ENCOUNTER — PATIENT MESSAGE (OUTPATIENT)
Dept: PALLIATIVE MEDICINE | Facility: CLINIC | Age: 64
End: 2024-04-08
Payer: MEDICARE

## 2024-04-08 DIAGNOSIS — R63.0 ANOREXIA: ICD-10-CM

## 2024-04-08 RX ORDER — DRONABINOL 10 MG/1
10 CAPSULE ORAL
Qty: 60 CAPSULE | Refills: 0 | Status: CANCELLED | OUTPATIENT
Start: 2024-04-08 | End: 2024-05-08

## 2024-04-08 RX ORDER — DRONABINOL 10 MG/1
10 CAPSULE ORAL 2 TIMES DAILY PRN
Qty: 60 CAPSULE | Refills: 0 | Status: SHIPPED | OUTPATIENT
Start: 2024-04-08 | End: 2024-05-08

## 2024-04-09 ENCOUNTER — ANESTHESIA (OUTPATIENT)
Dept: ENDOSCOPY | Facility: HOSPITAL | Age: 64
End: 2024-04-09
Payer: MEDICARE

## 2024-04-09 ENCOUNTER — HOSPITAL ENCOUNTER (OUTPATIENT)
Facility: HOSPITAL | Age: 64
Discharge: HOME | End: 2024-04-09
Attending: INTERNAL MEDICINE
Payer: MEDICARE

## 2024-04-09 ENCOUNTER — ANESTHESIA EVENT (OUTPATIENT)
Dept: ENDOSCOPY | Facility: HOSPITAL | Age: 64
End: 2024-04-09
Payer: MEDICARE

## 2024-04-09 VITALS
OXYGEN SATURATION: 99 % | SYSTOLIC BLOOD PRESSURE: 166 MMHG | TEMPERATURE: 98 F | WEIGHT: 235 LBS | RESPIRATION RATE: 20 BRPM | BODY MASS INDEX: 36.88 KG/M2 | HEIGHT: 67 IN | HEART RATE: 52 BPM | DIASTOLIC BLOOD PRESSURE: 67 MMHG

## 2024-04-09 DIAGNOSIS — R10.9 ABDOMINAL PAIN: ICD-10-CM

## 2024-04-09 PROCEDURE — 45380 COLONOSCOPY AND BIOPSY: CPT | Performed by: INTERNAL MEDICINE

## 2024-04-09 PROCEDURE — 88305 TISSUE EXAM BY PATHOLOGIST: CPT | Mod: TC | Performed by: PATHOLOGY

## 2024-04-09 PROCEDURE — 27201012 HC FORCEPS, HOT/COLD, DISP: Performed by: INTERNAL MEDICINE

## 2024-04-09 PROCEDURE — 45380 COLONOSCOPY AND BIOPSY: CPT | Mod: ,,, | Performed by: INTERNAL MEDICINE

## 2024-04-09 PROCEDURE — 63600175 PHARM REV CODE 636 W HCPCS: Performed by: STUDENT IN AN ORGANIZED HEALTH CARE EDUCATION/TRAINING PROGRAM

## 2024-04-09 PROCEDURE — 37000008 HC ANESTHESIA 1ST 15 MINUTES: Performed by: INTERNAL MEDICINE

## 2024-04-09 PROCEDURE — 37000009 HC ANESTHESIA EA ADD 15 MINS: Performed by: INTERNAL MEDICINE

## 2024-04-09 PROCEDURE — 25000003 PHARM REV CODE 250: Performed by: STUDENT IN AN ORGANIZED HEALTH CARE EDUCATION/TRAINING PROGRAM

## 2024-04-09 PROCEDURE — 25000003 PHARM REV CODE 250: Performed by: INTERNAL MEDICINE

## 2024-04-09 RX ORDER — SODIUM CHLORIDE 9 MG/ML
INJECTION, SOLUTION INTRAVENOUS CONTINUOUS
Status: DISCONTINUED | OUTPATIENT
Start: 2024-04-09 | End: 2024-04-09 | Stop reason: HOSPADM

## 2024-04-09 RX ORDER — PROPOFOL 10 MG/ML
VIAL (ML) INTRAVENOUS
Status: DISCONTINUED | OUTPATIENT
Start: 2024-04-09 | End: 2024-04-09

## 2024-04-09 RX ORDER — LIDOCAINE HYDROCHLORIDE 20 MG/ML
INJECTION INTRAVENOUS
Status: DISCONTINUED | OUTPATIENT
Start: 2024-04-09 | End: 2024-04-09

## 2024-04-09 RX ADMIN — PROPOFOL 50 MG: 10 INJECTION, EMULSION INTRAVENOUS at 12:04

## 2024-04-09 RX ADMIN — SODIUM CHLORIDE: 9 INJECTION, SOLUTION INTRAVENOUS at 11:04

## 2024-04-09 RX ADMIN — LIDOCAINE HYDROCHLORIDE 100 MG: 20 INJECTION, SOLUTION INTRAVENOUS at 12:04

## 2024-04-09 NOTE — ANESTHESIA PREPROCEDURE EVALUATION
04/09/2024  Mirna Quinteros is a 63 y.o., female.      Pre-op Assessment    I have reviewed the Patient Summary Reports.     I have reviewed the Nursing Notes. I have reviewed the NPO Status.   I have reviewed the Medications.     Review of Systems  Anesthesia Hx:  No problems with previous Anesthesia                Social:  Non-Smoker       Hematology/Oncology:       -- Anemia:               Hematology Comments: DVT w/PE        --  Cancer in past history (ovarian CA s/p chemo):                     Cardiovascular:     Hypertension                                        Pulmonary:   COPD, mild Asthma asymptomatic Shortness of breath  Sleep Apnea                Renal/:  Renal/ Normal                 Hepatic/GI:     GERD             Musculoskeletal:  Arthritis (RA)          Spine Disorders: lumbar Disc disease and Degenerative disease           Neurological:    Neuromuscular Disease,  Headaches           Chronic Pain Syndrome   Peripheral Neuropathy                          Endocrine:  Diabetes, well controlled, type 2         Obesity / BMI > 30, Morbid Obesity / BMI > 40  Psych:  Psychiatric History  depression              Physical Exam  General: Well nourished, Cooperative, Alert and Oriented    Airway:  Mallampati: II   Mouth Opening: Normal  TM Distance: Normal  Neck ROM: Normal ROM    Dental:  Dentures, Partial Dentures    Anesthesia Plan  Type of Anesthesia, risks & benefits discussed:    Anesthesia Type: Gen ETT, Gen Supraglottic Airway, Gen Natural Airway, MAC  Intra-op Monitoring Plan: Standard ASA Monitors  Post Op Pain Control Plan: multimodal analgesia  Induction:  IV  Airway Plan: Direct, Video and Fiberoptic, Post-Induction  Informed Consent: Informed consent signed with the Patient and all parties understand the risks and agree with anesthesia plan.  All questions answered.   ASA Score:  3    Ready For Surgery From Anesthesia Perspective.   .

## 2024-04-09 NOTE — ANESTHESIA POSTPROCEDURE EVALUATION
Anesthesia Post Evaluation    Patient: Mirna Quinteros    Procedure(s) Performed: Procedure(s) (LRB):  COLONOSCOPY (N/A)    Final Anesthesia Type: general      Patient location during evaluation: PACU  Patient participation: Yes- Able to Participate  Level of consciousness: awake and alert and oriented  Post-procedure vital signs: reviewed and stable  Pain management: adequate  Airway patency: patent    PONV status at discharge: No PONV  Anesthetic complications: no      Cardiovascular status: blood pressure returned to baseline and stable  Respiratory status: unassisted and spontaneous ventilation  Hydration status: euvolemic  Follow-up not needed.              Vitals Value Taken Time   /67 04/09/24 1249   Temp   04/09/24 1249   Pulse 54 04/09/24 1249   Resp   04/09/24 1249   SpO2 100 % 04/09/24 1249   Vitals shown include unvalidated device data.      No case tracking events are documented in the log.      Pain/Jerry Score: Jerry Score: 10 (4/9/2024 12:47 PM)

## 2024-04-09 NOTE — H&P
[facility] Note    CC: Abdominal pain    HPI 63 y.o. female presents for evaluation of abdominal pain    Past Medical History:   Diagnosis Date    Arthritis     Asthma     C. difficile colitis     Depression     Diabetes mellitus, type 2     Diabetic neuropathy     DVT (deep venous thrombosis)     Enteritis due to Norovirus 02/22/2019    Feeding difficulty in adult 11/22/2021    Formatting of this note might be different from the original. Added automatically from request for surgery [id number] Last Assessment & Plan: Formatting of this note might be different from the original. · Patient with history of ovarian cancer s/p MARCELO/BSO, omentectomy, radical tumor debulking, cytoreduction, appendectomy, partial hepatectomy, and HIPEC per Dr. Forrester and Dr. Jordan 12/13 with postope    GERD (gastroesophageal reflux disease)     Hyperlipidemia     Hypertension     Internal hemorrhoids     Pancreatitis 09/23/2018    Last Assessment & Plan: Formatting of this note might be different from the original. Mirna is a  58 year old female presenting with abdominal pain.  CT abd/pevis w/o contrast revealed acute pancreatitis involving pancreatic head with no fluid collections/necrosis.  EGD from 9/24/2018 unremarkable. Assessment: Today patient is sitting up and interactive.  No acute stress obvious.   Reports pain in    Pulmonary embolism     Respiratory failure with hypoxia 10/06/2021    Rheumatoid arthritis(714.0)     Right upper quadrant pain 08/22/2021    SOB (shortness of breath) 02/24/2023       Allergies and Medications reviewed     Review of Systems  General ROS: negative for - chills, fever or weight loss  Cardiovascular ROS: no chest pain or dyspnea on exertion  Gastrointestinal ROS: + abdominal pain    Physical Examination  There were no vitals taken for this visit.  General appearance: alert, cooperative, no distress  HENT: Normocephalic, atraumatic, neck symmetrical, no nasal discharge, sclera anicteric    Lungs: clear to auscultation bilaterally, symmetric chest wall expansion bilaterally  Heart: regular rate and rhythm without rub; no displacement of the PMI   Abdomen: soft  Extremities: extremities symmetric; no clubbing, cyanosis, or edema        Labs:  Lab Results   Component Value Date    WBC 4.07 01/11/2024    HGB 11.7 (L) 01/11/2024    HCT 34.4 (L) 01/11/2024    MCV 89 01/11/2024     01/11/2024         Assessment:   63 y.o. female presents for colonoscopy     Plan:  -Proceed to colonoscopy     Abbie Still MD  [facility]  [address]  [place] [address]  Office: [phone]  Fax: [phone]

## 2024-04-09 NOTE — PROVATION PATIENT INSTRUCTIONS
Discharge Summary/Instructions after an Endoscopic Procedure  Patient Name: Mirna Quinteros  Patient MRN: [id number]  Patient YOB: 1960 Tuesday, April 9, 2024  Abbie Still MD  Dear patient,  As a result of recent federal legislation (The Federal Cures Act), you may   receive lab or pathology results from your procedure in your [facility]   account before your physician is able to contact you. Your physician or   their representative will relay the results to you with their   recommendations at their soonest availability.  Thank you,  RESTRICTIONS:  During your procedure today, you received medications for sedation.  These   medications may affect your judgment, balance and coordination.  Therefore,   for 24 hours, you have the following restrictions:   - DO NOT drive a car, operate machinery, make legal/financial decisions,   sign important papers or drink alcohol.    ACTIVITY:  Today: no heavy lifting, straining or running due to procedural   sedation/anesthesia.  The following day: return to full activity including work.  DIET:  Eat and drink normally unless instructed otherwise.     TREATMENT FOR COMMON SIDE EFFECTS:  - Mild abdominal pain, nausea, belching, bloating or excessive gas:  rest,   eat lightly and use a heating pad.  - Sore Throat: treat with throat lozenges and/or gargle with warm salt   water.  - Because air was used during the procedure, expelling large amounts of air   from your rectum or belching is normal.  - If a bowel prep was taken, you may not have a bowel movement for 1-3 days.    This is normal.  SYMPTOMS TO WATCH FOR AND REPORT TO YOUR PHYSICIAN:  1. Abdominal pain or bloating, other than gas cramps.  2. Chest pain.  3. Back pain.  4. Signs of infection such as: chills or fever occurring within 24 hours   after the procedure.  5. Rectal bleeding, which would show as bright red, maroon, or black stools.   (A tablespoon of blood from the rectum is not serious,  especially if   hemorrhoids are present.)  6. Vomiting.  7. Weakness or dizziness.  GO DIRECTLY TO THE NEAREST EMERGENCY ROOM IF YOU HAVE ANY OF THE FOLLOWING:      Difficulty breathing              Chills and/or fever over 101 F   Persistent vomiting and/or vomiting blood   Severe abdominal pain   Severe chest pain   Black, tarry stools   Bleeding- more than one tablespoon   Any other symptom or condition that you feel may need urgent attention  Your doctor recommends these additional instructions:  If any biopsies were taken, your doctors clinic will contact you in 1 to 2   weeks with any results.  - Discharge patient to home (with escort).   - Patient has a contact number available for emergencies.  The signs and   symptoms of potential delayed complications were discussed with the   patient.  Return to normal activities tomorrow.  Written discharge   instructions were provided to the patient.   - Resume previous diet.   - Continue present medications.   - Await pathology results.   - Repeat colonoscopy in 10 years for screening purposes.   - Return to nurse practitioner PRN.   -Begin fiber supplement  -Refer to pelvic floor physcial therapy  For questions, problems or results please call your physician - Abbie Still MD at Work:  [phone].  [facility], EMERGENCY ROOM PHONE NUMBER: [phone]  IF A COMPLICATION OR EMERGENCY SITUATION ARISES AND YOU ARE UNABLE TO REACH   YOUR PHYSICIAN - GO DIRECTLY TO THE EMERGENCY ROOM.  Abbie Still MD  4/9/2024 12:30:10 PM  This report has been verified and signed electronically.  Dear patient,  As a result of recent federal legislation (The Federal Cures Act), you may   receive lab or pathology results from your procedure in your [facility]   account before your physician is able to contact you. Your physician or   their representative will relay the results to you with their   recommendations at their soonest  availability.  Thank you,  PROVATION

## 2024-04-09 NOTE — TRANSFER OF CARE
"Anesthesia Transfer of Care Note    Patient: Mirna Quinteros    Procedure(s) Performed: Procedure(s) (LRB):  COLONOSCOPY (N/A)    Patient location: PACU    Anesthesia Type: general    Transport from OR: Transported from OR on room air with adequate spontaneous ventilation    Post pain: adequate analgesia    Post assessment: no apparent anesthetic complications    Post vital signs: stable    Level of consciousness: responds to stimulation and lethargic    Nausea/Vomiting: no nausea/vomiting    Complications: none    Transfer of care protocol was followed      Last vitals: Visit Vitals  BP (!) 164/70 (BP Location: Left arm, Patient Position: Lying)   Pulse (!) 49   Temp 36.2 °C (97.2 °F) (Skin)   Resp 16   Ht 5' 7" (1.702 m)   Wt 106.6 kg (235 lb)   SpO2 99%   Breastfeeding No   BMI 36.81 kg/m²     "

## 2024-04-11 ENCOUNTER — DOCUMENTATION ONLY (OUTPATIENT)
Dept: HEMATOLOGY/ONCOLOGY | Facility: CLINIC | Age: 64
End: 2024-04-11
Payer: MEDICARE
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
## 2024-04-11 NOTE — PROGRESS NOTES
Received e mail from [facility] that patient reached out to them for assistance. They will need an application completed. Reached out to the patient to discuss. There was no answer so I left a detailed message along with my contact information.

## 2024-04-12 ENCOUNTER — DOCUMENTATION ONLY (OUTPATIENT)
Dept: HEMATOLOGY/ONCOLOGY | Facility: CLINIC | Age: 64
End: 2024-04-12
Payer: MEDICARE

## 2024-04-12 DIAGNOSIS — K59.00 CONSTIPATION, UNSPECIFIED CONSTIPATION TYPE: ICD-10-CM

## 2024-04-12 RX ORDER — LINACLOTIDE 72 UG/1
72 CAPSULE, GELATIN COATED ORAL
Qty: 90 CAPSULE | Refills: 3 | Status: SHIPPED | OUTPATIENT
Start: 2024-04-12

## 2024-04-12 NOTE — PROGRESS NOTES
JEN covering for Savita Quinteros LCSW. Patient called  inquiring into referral to food pantry.    SW called patient, left VM explaining that Savita did send a message to dietician requesting patient be scheduled to pickup pantry items. SW provided direct call back number.

## 2024-04-15 ENCOUNTER — DOCUMENTATION ONLY (OUTPATIENT)
Dept: HEMATOLOGY/ONCOLOGY | Facility: CLINIC | Age: 64
End: 2024-04-15
Payer: MEDICARE

## 2024-04-16 ENCOUNTER — OFFICE VISIT (OUTPATIENT)
Dept: GYNECOLOGIC ONCOLOGY | Facility: CLINIC | Age: 64
End: 2024-04-16
Payer: MEDICARE

## 2024-04-16 ENCOUNTER — TELEPHONE (OUTPATIENT)
Dept: HEMATOLOGY/ONCOLOGY | Facility: CLINIC | Age: 64
End: 2024-04-16
Payer: MEDICARE

## 2024-04-16 ENCOUNTER — LAB VISIT (OUTPATIENT)
Dept: LAB | Facility: HOSPITAL | Age: 64
End: 2024-04-16
Attending: INTERNAL MEDICINE
Payer: MEDICARE

## 2024-04-16 ENCOUNTER — DOCUMENTATION ONLY (OUTPATIENT)
Dept: HEMATOLOGY/ONCOLOGY | Facility: CLINIC | Age: 64
End: 2024-04-16
Payer: MEDICARE

## 2024-04-16 ENCOUNTER — ANTI-COAG VISIT (OUTPATIENT)
Dept: CARDIOLOGY | Facility: CLINIC | Age: 64
End: 2024-04-16
Payer: MEDICARE

## 2024-04-16 VITALS
WEIGHT: 240.06 LBS | HEART RATE: 46 BPM | DIASTOLIC BLOOD PRESSURE: 75 MMHG | SYSTOLIC BLOOD PRESSURE: 179 MMHG | BODY MASS INDEX: 37.6 KG/M2

## 2024-04-16 DIAGNOSIS — I82.5Z2 CHRONIC DEEP VEIN THROMBOSIS (DVT) OF DISTAL VEIN OF LEFT LOWER EXTREMITY: ICD-10-CM

## 2024-04-16 DIAGNOSIS — Z79.01 WARFARIN ANTICOAGULATION: ICD-10-CM

## 2024-04-16 DIAGNOSIS — C57.00 FALLOPIAN TUBE CANCER, CARCINOMA, UNSPECIFIED LATERALITY: Primary | ICD-10-CM

## 2024-04-16 DIAGNOSIS — Z79.01 WARFARIN ANTICOAGULATION: Primary | ICD-10-CM

## 2024-04-16 DIAGNOSIS — Z01.818 EXAMINATION PRIOR TO CHEMOTHERAPY: ICD-10-CM

## 2024-04-16 DIAGNOSIS — I82.90 VTE (VENOUS THROMBOEMBOLISM): ICD-10-CM

## 2024-04-16 DIAGNOSIS — Z79.01 LONG TERM (CURRENT) USE OF ANTICOAGULANTS: ICD-10-CM

## 2024-04-16 DIAGNOSIS — C57.00 FALLOPIAN TUBE CANCER, CARCINOMA, UNSPECIFIED LATERALITY: ICD-10-CM

## 2024-04-16 LAB
ALBUMIN SERPL BCP-MCNC: 3.7 G/DL (ref 3.5–5.2)
ALP SERPL-CCNC: 69 U/L (ref 55–135)
ALT SERPL W/O P-5'-P-CCNC: 13 U/L (ref 10–44)
ANION GAP SERPL CALC-SCNC: 8 MMOL/L (ref 8–16)
AST SERPL-CCNC: 18 U/L (ref 10–40)
BILIRUB SERPL-MCNC: 0.3 MG/DL (ref 0.1–1)
BUN SERPL-MCNC: 13 MG/DL (ref 8–23)
CALCIUM SERPL-MCNC: 9.7 MG/DL (ref 8.7–10.5)
CANCER AG125 SERPL-ACNC: 5 U/ML (ref 0–30)
CHLORIDE SERPL-SCNC: 108 MMOL/L (ref 95–110)
CO2 SERPL-SCNC: 26 MMOL/L (ref 23–29)
CREAT SERPL-MCNC: 0.9 MG/DL (ref 0.5–1.4)
ERYTHROCYTE [DISTWIDTH] IN BLOOD BY AUTOMATED COUNT: 14.8 % (ref 11.5–14.5)
EST. GFR  (NO RACE VARIABLE): >60 ML/MIN/1.73 M^2
GLUCOSE SERPL-MCNC: 123 MG/DL (ref 70–110)
HCT VFR BLD AUTO: 33.6 % (ref 37–48.5)
HGB BLD-MCNC: 11 G/DL (ref 12–16)
IMM GRANULOCYTES # BLD AUTO: 0.01 K/UL (ref 0–0.04)
INR PPP: 1.7 (ref 0.8–1.2)
MCH RBC QN AUTO: 30.1 PG (ref 27–31)
MCHC RBC AUTO-ENTMCNC: 32.7 G/DL (ref 32–36)
MCV RBC AUTO: 92 FL (ref 82–98)
NEUTROPHILS # BLD AUTO: 2.3 K/UL (ref 1.8–7.7)
PLATELET # BLD AUTO: 198 K/UL (ref 150–450)
PMV BLD AUTO: 10 FL (ref 9.2–12.9)
POTASSIUM SERPL-SCNC: 4.5 MMOL/L (ref 3.5–5.1)
PROT SERPL-MCNC: 7.3 G/DL (ref 6–8.4)
PROTHROMBIN TIME: 18.2 SEC (ref 9–12.5)
RBC # BLD AUTO: 3.65 M/UL (ref 4–5.4)
SODIUM SERPL-SCNC: 142 MMOL/L (ref 136–145)
WBC # BLD AUTO: 4.05 K/UL (ref 3.9–12.7)

## 2024-04-16 PROCEDURE — 1101F PT FALLS ASSESS-DOCD LE1/YR: CPT | Mod: CPTII,S$GLB,, | Performed by: OBSTETRICS & GYNECOLOGY

## 2024-04-16 PROCEDURE — 3078F DIAST BP <80 MM HG: CPT | Mod: CPTII,S$GLB,, | Performed by: OBSTETRICS & GYNECOLOGY

## 2024-04-16 PROCEDURE — 85610 PROTHROMBIN TIME: CPT | Performed by: INTERNAL MEDICINE

## 2024-04-16 PROCEDURE — 3008F BODY MASS INDEX DOCD: CPT | Mod: CPTII,S$GLB,, | Performed by: OBSTETRICS & GYNECOLOGY

## 2024-04-16 PROCEDURE — 86304 IMMUNOASSAY TUMOR CA 125: CPT | Performed by: OBSTETRICS & GYNECOLOGY

## 2024-04-16 PROCEDURE — 85027 COMPLETE CBC AUTOMATED: CPT | Performed by: OBSTETRICS & GYNECOLOGY

## 2024-04-16 PROCEDURE — 99215 OFFICE O/P EST HI 40 MIN: CPT | Mod: S$GLB,,, | Performed by: OBSTETRICS & GYNECOLOGY

## 2024-04-16 PROCEDURE — 1159F MED LIST DOCD IN RCRD: CPT | Mod: CPTII,S$GLB,, | Performed by: OBSTETRICS & GYNECOLOGY

## 2024-04-16 PROCEDURE — 99999 PR PBB SHADOW E&M-EST. PATIENT-LVL IV: CPT | Mod: PBBFAC,,, | Performed by: OBSTETRICS & GYNECOLOGY

## 2024-04-16 PROCEDURE — 3077F SYST BP >= 140 MM HG: CPT | Mod: CPTII,S$GLB,, | Performed by: OBSTETRICS & GYNECOLOGY

## 2024-04-16 PROCEDURE — 80053 COMPREHEN METABOLIC PANEL: CPT | Performed by: OBSTETRICS & GYNECOLOGY

## 2024-04-16 PROCEDURE — 3288F FALL RISK ASSESSMENT DOCD: CPT | Mod: CPTII,S$GLB,, | Performed by: OBSTETRICS & GYNECOLOGY

## 2024-04-16 PROCEDURE — 93793 ANTICOAG MGMT PT WARFARIN: CPT | Mod: S$GLB,,,

## 2024-04-16 PROCEDURE — 36415 COLL VENOUS BLD VENIPUNCTURE: CPT | Performed by: INTERNAL MEDICINE

## 2024-04-16 PROCEDURE — 1125F AMNT PAIN NOTED PAIN PRSNT: CPT | Mod: CPTII,S$GLB,, | Performed by: OBSTETRICS & GYNECOLOGY

## 2024-04-16 PROCEDURE — 1157F ADVNC CARE PLAN IN RCRD: CPT | Mod: CPTII,S$GLB,, | Performed by: OBSTETRICS & GYNECOLOGY

## 2024-04-16 RX ORDER — WARFARIN 7.5 MG/1
TABLET ORAL
Qty: 60 TABLET | Refills: 3 | Status: SHIPPED | OUTPATIENT
Start: 2024-04-16

## 2024-04-16 RX ORDER — FUROSEMIDE 40 MG/1
40 TABLET ORAL 2 TIMES DAILY
Qty: 180 TABLET | Refills: 3 | Status: SHIPPED | OUTPATIENT
Start: 2024-04-16 | End: 2025-04-16

## 2024-04-16 NOTE — PROGRESS NOTES
[facility]    2024 11:52 AM    Assessment/Plan:    Patient presents today with subtherapeutic  INR.    Assessment of patient findings and chart review: pt is s/p cscope , warfarin resumed on .    Recommendation for patient's warfarin regimen: dose adjusted based on pt reported dose    Recommend repeat INR in 2 weeks  _________________________________________________________________    Mirna Quinteros (63 y.o.) is followed by the [employer] [facility].    Anticoagulation Summary  As of 2024      INR goal:  2.0-2.5   TTR:  16.1% (4.1 mo)   INR used for dosin.7 (2024)   Warfarin maintenance plan:  0 mg every Wed; 3.75 mg (7.5 mg x 0.5) every Fri; 7.5 mg (7.5 mg x 1) all other days   Weekly warfarin total:  41.25 mg   Plan last modified:  Evon Sarmiento, PharmD (2024)   Next INR check:  2024   Target end date:      Indications    Warfarin anticoagulation [Z79.01]  Long term (current) use of anticoagulants [Z79.01]  Chronic deep vein thrombosis (DVT) of distal vein of left lower extremity [I82.5Z2]                 Anticoagulation Episode Summary       INR check location:  Anticoagulation Clinic    Preferred lab:      Send INR reminders to:  [facility] COUMADIN MONITORING POOL    Comments:  [facility] [place] Phone   [phone] Fax   [phone]          Anticoagulation Care Providers       Provider Role Specialty Phone number    Emil Burgess MD [facility] [phone]            Patient Findings       Positives:  Upcoming invasive procedure, Missed doses    Negatives:  Signs/symptoms of bleeding, Change in health, Change in alcohol use, Change in activity, Emergency department visit, Upcoming dental procedure, Extra doses, Change in medications, Change in diet/appetite, Hospital admission, Bruising, Other complaints    Comments:  Pt held dose for 5 days for  colonoscopy,  resumed warfarin 4/09 at 7.5mg daily except 0mg Wed., and 3.75mg Fri., has had wt gain,

## 2024-04-16 NOTE — TELEPHONE ENCOUNTER
----- Message from Eleonora Regan sent at 4/16/2024 10:04 AM CDT -----  Type:  RX Refill Request    Who Called: Saumya  Refill or New Rx:refill  RX Name and Strength:  1) warfarin (COUMADIN) 7.5 MG tablet 60 tablet   Sig: Mon, Tue, Thurs, Fri, Sat, Sun. (Daily EXCEPT Wednesday)    2)furosemide (LASIX) 40 MG tablet - -  - --  Sig - Route: Take 40 mg by mouth 2 (two) times a day. - Oral    Is this a 30 day or 90 day RX:90  Preferred Pharmacy with phone number:  [facility] - [place], [place] - [address]  [address] [address]  Phone: [phone] Fax: [phone]      Local or Mail Order:mail  Ordering Provider:ervin  Would the patient rather a call back or a response via [facility]?n/a  Best Call Back Number:n/a  Additional Information:

## 2024-04-16 NOTE — TELEPHONE ENCOUNTER
Spoke w/ pt to schedule nutrition referral placed by Dr. Burgess. Pt approved to schedule virtual appt with Felecia Romero RD for Monday 5/6/24 @ 1PM. Pt stated pt is familiar with utilizing portal for virtual visit.       [place], [place] ext [phone]

## 2024-04-16 NOTE — PROGRESS NOTES
Subjective:      Patient ID: Mirna Quinteros is a 63 y.o. female.    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   Has recently moved to [place].     She is currently on maintenance therapy with Niraparib 100mg (initiated 4/2022).   Pain regimen MS Contin 15mg, percocet prn, Tizanidine. Also uses marinol. Established with palliative care team.     VTE on Coumadin     CT 12/24/2023 shows no evidence of disease.  12/6/2023  6 (pretreatment 144)    Breast biopsy 1/2024 negative.   CT A&P 2/2024 for other indications shows no significant abnormalities   Colonoscopy 4/2024     1/2024 4.8    4/2024 5      _________________________________________________________  Her oncologic history is as follows:  Fallopian tube carcinoma (HCC)   8/21/2021 - Hospital Admission   To ED with abdominal pain     8/21/2021 Imaging   CT A/P  1. Findings consistent with peritoneal carcinomatosis.  2. Mild pelvic ascites.  3. No other acute process     8/21/2021 Other   Component  Latest Ref Rng & Units 8/21/2021   CEA  0.00 - 2.50 ng/mL 1.09   CA 19-9  0.0 - 40.0 U/mL 19.3     0.0 - 35.0 U/mL 144.0 (H)      8/22/2021 Imaging   CT Chest  1. No findings of thoracic metastatic disease.  2. Bilateral lower lung atelectasis.  3. No thoracic adenopathy.     8/23/2021 Biopsy   Diagnostic Lap - Dr. Jordan     A. Abdominal mass #1, biopsy:   Metastatic carcinoma; see comment.      B. Abdominal mass, biopsy:   Metastatic carcinoma; see comment.      C. Abdominal mass #2, biopsy:   Fibroconnective tissue with rare atypical cells.   Additional levels reviewed.      D. Abdominal mass #3, biopsy:   Metastatic carcinoma, compatible with high grade serous carcinoma.   See diagnostic comment.      E. Liver, segment 3, biopsy:   Hepatic parenchyma with scattered chronic inflammation, fibrosis, and rare atypical cells.   Additional levels reviewed.      8/26/2021  Imaging   Pelvic U/S  1. Fibroid uterus. Normal endometrial stripe thickness.  2. Nonvisualization of the ovaries.  3. Small amount of free fluid in the pelvis.     8/27/2021 - Chemotherapy   CARBOplatin AUC 6 / PACLitaxel 175mg/m2     C1 - In [facility]     C2 - Taxol to 135mg/m2 due to neuropathy     C3 - Held due to Covid +, hospital admission follow due to bacteremia  Decrease Carbo dose to AUC 4 due to performance status     10/5/2021 Imaging   CT A/P (in ED due to nausea)  1. Normal CT appearance of the appendix. No acute process.  2. Decreased stranding in the omental fat suggesting response to treatment.  3. No lymphadenopathy.  4. Constipation. No bowel obstruction.  5. Bilateral airspace disease suggesting atypical pneumonia.  6. Prior cholecystectomy.     10/6/2021 - 10/8/2021 Hospital Admission   Covid pneumonia     10/14/2021 - Hospital Admission   TO ED due to fever     Treated     10/20/2021 Imaging   CT Chest  1. Interval removal of the right IJ port. There is a small hematoma at the site of port removal within the subcutaneous soft tissues of the anterior abdominal wall measuring 2.7 x 1.6 cm. Additionally, there is a filling defect within the right IJ/SVC measuring approximately 1.7 cm in length compatible with fibrin sheath versus thrombus.  2. Increasing bilateral peripheral somewhat wedge-shaped opacities within both lungs. Differential considerations include septic emboli, atypical/viral pneumonia, and pulmonary infarcts. Central pulmonary arteries appear patent     11/12/2021 Imaging   CT C/A/P (s/p 3 cycles C/T)  CHEST:  Moderate improvement in previously seen ill-defined parenchymal opacities with residual mild-to-moderate linear densities some of which have nodular thickening particularly in the left lung. Residual densities may represent residual inflammation/infection or areas of persistent post infectious scarring. A few of the more nodular components described in the left lung in the body of  the report require continued follow-up.     0.3 cm subpleural right upper lobe lung nodule stable. Continued attention on follow-up imaging.     No lymphadenopathy.     ABDOMEN/PELVIS:  No convincing evidence of new metastatic disease.     No lymphadenopathy.     Very mild residual stranding seen within the omentum without nodularity.     No ascites.     Nonspecific mild varicosities involving the lower anterior abdominal wall within the subcutaneous fat, stable.     12/13/2021 Surgery   Debulking, MARCELO, BSO, omentectomy, HIPEC, partial hepatectomy     BILATERAL FALLOPIAN TUBES: HIGH-GRADE SEROUS CARCINOMA      Procedure: Total hysterectomy and bilateral salpingo-oophorectomy   Specimen integrity: Right ovary - Capsule intact  Left ovary - Capsule intact  Right fallopian tube - Serosa intact  Left fallopian tube - Serosa intact   Tumor site: Bilateral fallopian tubes   Tumor size: See comment   Histologic type: High grade serous carcinoma   Histologic grade: High grade   Ovarian surface involvement: Not identified   Fallopian tube surface involvement: Not identified   Implants (for borderline tumors only): Not applicable   Other tissue/ organ involvement: Not identified   Largest extrapelvic peritoneal focus: Not applicable   Peritoneal/Ascitic fluid involvement: Not submitted/unknown   Chemotherapy response score (CRS): CRS3 (marked response with no or minimal residual cancer)   Regional lymph node status: All regional lymph nodes negative for tumor cells   No. of nodes with metastasis >10 mm: Not applicable   No. of nodes with metastasis <10 mm (excluding isolated tumor cells): Not applicable   No. of nodes with isolated tumor cells (0.2 mm or less): Not applicable   Number of lymph nodes examined: 1   Distant metastasis: Not applicable   AJCC 8th edition pathologic stage: pT1b, pN0,   pM not applicable - pM cannot be determined from the submitted specimen(s)  TNM Descriptors: y (posttreatment      Discharged  1/11/22. Extended hospital stay.      3/9/2022 Cancer Staged   Staging form: Ovary, Fallopian Tube, And Primary Peritoneal Carcinoma, AJCC 8th Edition  - Pathologic: FIGO Stage IIIC (pT3c, cM0) - Signed by Casimiro Forrester MD on 3/9/2022     3/21/2022 Imaging   CT C/A/P  1. No focal consolidation or nodule in the lung. Minimal subsegmental atelectasis/scarring.  2. Postsurgical changes in the liver, pelvis and along the laparotomy scar. Small amount of fluid with air in the laparotomy scar could be due to an open wound, recommend clinical correlation.     4/1/2022 - Chemotherapy   Zejula 300mg daily     4/6 - Hold due to nausea and fatigue     4/11 - Plan to restart at 200mg daily     4/27 - Hold Zejula due to thrombocytopenia, neutropenia - Zejula not held until 4/29 5/18 - Okay to resume Zejula at 100mg daily     4/6/2022 Other   Genetic testing - Ms. Quinteros chose to proceed with [employer] xG panel.     5/9/2022 Imaging   CT A/P ( due to abdominal pain)   1. Postoperative changes again seen from a hysterectomy, bilateral salpingo-oophorectomy, omentectomy, and appendectomy. Residual infiltrative changes in the midline subcutaneous tissues again seen, compatible with postoperative scarring/granulation tissue. A chronic deep tract of gas and trace fluid along the abdominal wall musculature is not significantly changed. No enlarging or new fluid collections are identified in the anterior abdomino-pelvic wall.  2. No lymphadenopathy or evidence of metastatic disease in the abdomen or pelvis.  3. Persistent moderate linear atelectasis/scarring in the visualized lung bases, not significantly improved. If there are persistent/developing pulmonary symptoms, consider a follow-up CT chest.  6/7/2023: CT Chest- No evidence of metastatic disease in the chest. CT Abdomen & Pelvis- No evidence of recurrent or metastatic disease.      Review of Systems   Constitutional:  Negative for appetite change, chills, fatigue and fever.    HENT:  Negative for mouth sores.    Respiratory:  Negative for cough and shortness of breath.    Cardiovascular:  Negative for leg swelling.   Gastrointestinal:  Negative for abdominal pain, blood in stool, constipation and diarrhea.   Endocrine: Negative for cold intolerance.   Genitourinary:  Negative for dysuria and vaginal bleeding.   Musculoskeletal:  Negative for myalgias.   Skin:  Negative for rash.   Allergic/Immunologic: Negative.    Neurological:  Negative for weakness and numbness.   Hematological:  Negative for adenopathy. Does not bruise/bleed easily.   Psychiatric/Behavioral:  Negative for confusion.        Past Medical History:   Diagnosis Date    Arthritis     Asthma     C. difficile colitis     Depression     Diabetes mellitus, type 2     Diabetic neuropathy     DVT (deep venous thrombosis)     Enteritis due to Norovirus 02/22/2019    Feeding difficulty in adult 11/22/2021    Formatting of this note might be different from the original. Added automatically from request for surgery [id number] Last Assessment & Plan: Formatting of this note might be different from the original. · Patient with history of ovarian cancer s/p MARCELO/BSO, omentectomy, radical tumor debulking, cytoreduction, appendectomy, partial hepatectomy, and HIPEC per Dr. Forrester and Dr. Jordan 12/13 with postope    GERD (gastroesophageal reflux disease)     Hyperlipidemia     Hypertension     Internal hemorrhoids     Pancreatitis 09/23/2018    Last Assessment & Plan: Formatting of this note might be different from the original. Mirna is a  58 year old female presenting with abdominal pain.  CT abd/pevis w/o contrast revealed acute pancreatitis involving pancreatic head with no fluid collections/necrosis.  EGD from 9/24/2018 unremarkable. Assessment: Today patient is sitting up and interactive.  No acute stress obvious.   Reports pain in    Pulmonary embolism     Respiratory failure with hypoxia 10/06/2021    Rheumatoid arthritis(714.0)      Right upper quadrant pain 2021    SOB (shortness of breath) 2023     Past Surgical History:   Procedure Laterality Date    CARPAL TUNNEL RELEASE Left     CHOLECYSTECTOMY      COLONOSCOPY      about 10 years ago    COLONOSCOPY N/A 2024    Procedure: COLONOSCOPY;  Surgeon: Abbie Still MD;  Location: [facility];  Service: Endoscopy;  Laterality: N/A;    KNEE ARTHROSCOPY      KNEE SURGERY      TUBAL LIGATION      UPPER GASTROINTESTINAL ENDOSCOPY       Family History   Problem Relation Name Age of Onset    Heart disease Mother      Hypertension Mother      Diabetes Father      Stroke Father      Hypertension Father      Stroke Sister      Stroke Maternal Aunt      Stroke Maternal Aunt      Breast cancer Neg Hx      Colon cancer Neg Hx      Ovarian cancer Neg Hx      Colon polyps Neg Hx      Esophageal cancer Neg Hx      Stomach cancer Neg Hx       Social History     Socioeconomic History    Marital status: Single   Tobacco Use    Smoking status: Former     Current packs/day: 0.00     Average packs/day: 0.5 packs/day for 49.7 years (24.8 ttl pk-yrs)     Types: Cigarettes     Start date: 1972     Quit date: 2021     Years since quittin.6    Smokeless tobacco: Never    Tobacco comments:      PPD   Substance and Sexual Activity    Alcohol use: No     Comment: pt states quit drinking 1 mo 1/2 ago    Drug use: Yes     Types: Marijuana     Comment: marinol pill for appetite    Sexual activity: Not Currently     Social Determinants of Health     Financial Resource Strain: Low Risk  (2023)    Overall Financial Resource Strain (CARDIA)     Difficulty of Paying Living Expenses: Not very hard   Recent Concern: Financial Resource Strain - Medium Risk (2023)    Received from [facility], [facility]    Overall Financial Resource Strain  "(CARDIA)     Difficulty of Paying Living Expenses: Somewhat hard   Food Insecurity: Food Insecurity Present (12/4/2023)    Hunger Vital Sign     Worried About Running Out of Food in the Last Year: Sometimes true     Ran Out of Food in the Last Year: Sometimes true   Transportation Needs: No Transportation Needs (12/4/2023)    PRAPARE - Transportation     Lack of Transportation (Medical): No     Lack of Transportation (Non-Medical): No   Physical Activity: Insufficiently Active (12/4/2023)    Exercise Vital Sign     Days of Exercise per Week: 2 days     Minutes of Exercise per Session: 30 min   Stress: Stress Concern Present (12/4/2023)    [employer] - Occupational Stress Questionnaire     Feeling of Stress : Very much   Social Connections: Unknown (12/4/2023)    Social Connection and Isolation Panel [NHANES]     Frequency of Communication with Friends and Family: Twice a week     Frequency of Social Gatherings with Friends and Family: Once a week     Active Member of Clubs or Organizations: No     Attends Club or Organization Meetings: Patient declined     Marital Status: Never    Housing Stability: Low Risk  (12/4/2023)    Housing Stability Vital Sign     Unable to Pay for Housing in the Last Year: No     Number of Places Lived in the Last Year: 2     Unstable Housing in the Last Year: No     Current Outpatient Medications   Medication Sig Dispense Refill    aspirin 81 mg Cap Take 81 mg by mouth once daily.      BD INSULIN PEN NEEDLE UF MINI 31 x 3/16 " Ndle   0    droNABinol (MARINOL) 10 MG capsule Take 1 capsule (10 mg total) by mouth 2 (two) times daily before meals. 60 capsule 0    droNABinol (MARINOL) 10 MG capsule Take 1 capsule (10 mg total) by mouth 2 (two) times daily as needed (appetite). 60 capsule 0    ezetimibe (ZETIA) 10 mg tablet Take 1 tablet (10 mg total) by mouth once daily. 90 tablet 2    flash glucose sensor (FREESTYLE LUC 2 SENSOR) Kit 1 each by " Misc.(Non-Drug; Combo Route) route as needed. 1 kit 6    hydrOXYzine HCL (ATARAX) 10 MG Tab Take 10 mg by mouth 3 (three) times daily as needed.      insulin aspart (NOVOLOG FLEXPEN) 100 unit/mL InPn Inject 8 Units into the skin 3 (three) times daily with meals. 1 Box 6    insulin degludec (TRESIBA FLEXTOUCH U-100) 100 unit/mL (3 mL) insulin pen Inject 32 Units into the skin.      lansoprazole (PREVACID) 30 MG capsule Take 1 capsule (30 mg total) by mouth once daily. 30 capsule 2    linaCLOtide (LINZESS) 72 mcg Cap capsule TAKE 1 CAPSULE (72 MCG TOTAL) BY MOUTH BEFORE BREAKFAST 90 capsule 3    mirtazapine (REMERON) 15 MG tablet Take 15 mg by mouth every evening.      montelukast (SINGULAIR) 10 mg tablet Take 1 tablet (10 mg total) by mouth every evening. 90 tablet 1    morphine (MS CONTIN) 15 MG 12 hr tablet Take 1 tablet (15 mg total) by mouth 2 (two) times daily. 60 tablet 0    MOUNJARO 2.5 mg/0.5 mL PnIj Inject 2.5 mg into the skin once a week. 4 Pen 3    ondansetron (ZOFRAN) 4 MG tablet Take 1 tablet (4 mg total) by mouth every 8 (eight) hours as needed (Nausea and vomiting). 12 tablet 0    oxyCODONE-acetaminophen (PERCOCET) 7.5-325 mg per tablet Take 1 tablet by mouth every 6 (six) hours as needed for Pain. 120 tablet 0    rosuvastatin (CRESTOR) 40 MG Tab Take 20 mg by mouth once daily.      sertraline (ZOLOFT) 100 MG tablet Take by mouth.      sumatriptan (IMITREX) 50 MG tablet Take by mouth.      SYMBICORT 160-4.5 mcg/actuation HFAA Inhale 2 puffs into the lungs every 12 (twelve) hours.      tiZANidine 4 mg Cap Take 4 mg by mouth 2 (two) times daily as needed.      valsartan-hydrochlorothiazide (DIOVAN-HCT) 80-12.5 mg per tablet Take 1 tablet by mouth once daily.      ZEJULA 100 mg Tab Take 1 tablet by mouth every morning.      furosemide (LASIX) 40 MG tablet Take 1 tablet (40 mg total) by mouth 2 (two) times a day. 180 tablet 3    warfarin (COUMADIN) 7.5 MG tablet Mon, Tue, Thurs, Fri, Sat, Sun. (Daily  EXCEPT Wednesday) 60 tablet 3     No current facility-administered medications for this visit.     Review of patient's allergies indicates:   Allergen Reactions    Liraglutide Other (See Comments)     pancreatitis    Empagliflozin      Other reaction(s): yeast infxn    Glyburide      Other reaction(s): unknown    Metformin      Other reaction(s): GI upset    Pioglitazone      Other reaction(s): Swelling    Iohexol Itching     Patient given 50mg benadryl im and itching subsided       Objective:   Physical Exam:   Constitutional: She is oriented to person, place, and time. She appears well-developed and well-nourished.    HENT:   Head: Normocephalic and atraumatic.    Eyes: Pupils are equal, round, and reactive to light. EOM are normal.    Neck: No thyromegaly present.    Cardiovascular:  Normal rate, regular rhythm and intact distal pulses.             Pulmonary/Chest: Effort normal and breath sounds normal. No respiratory distress. She has no wheezes.        Abdominal: Soft. Bowel sounds are normal. She exhibits no distension and no mass. There is no abdominal tenderness.             Musculoskeletal: Normal range of motion and moves all extremeties.      Lymphadenopathy:     She has no cervical adenopathy.        Right: No supraclavicular adenopathy present.        Left: No supraclavicular adenopathy present.    Neurological: She is alert and oriented to person, place, and time.    Skin: Skin is warm and dry. No rash noted.    Psychiatric: She has a normal mood and affect.       Assessment:     1. Fallopian tube cancer, carcinoma, unspecified laterality    2. Examination prior to chemotherapy          Plan:     Orders Placed This Encounter   Procedures    CBC Oncology        Comprehensive Metabolic Panel       Continue niraparib maintenance as above.  No evidence of disease.  normal and stable.   RTC 3 months or sooner if needed.     I spent approximately 45 minutes reviewing the available records and  evaluating the patient, out of which over 50% of the time was spent face to face with the patient in counseling and coordinating this patient's care.

## 2024-04-18 ENCOUNTER — PATIENT MESSAGE (OUTPATIENT)
Dept: FAMILY MEDICINE | Facility: CLINIC | Age: 64
End: 2024-04-18
Payer: MEDICARE

## 2024-04-18 DIAGNOSIS — R63.0 ANOREXIA: ICD-10-CM

## 2024-04-19 RX ORDER — DRONABINOL 10 MG/1
10 CAPSULE ORAL
Qty: 60 CAPSULE | Refills: 0 | Status: CANCELLED | OUTPATIENT
Start: 2024-04-19 | End: 2024-05-19

## 2024-04-22 ENCOUNTER — PATIENT MESSAGE (OUTPATIENT)
Dept: PALLIATIVE MEDICINE | Facility: CLINIC | Age: 64
End: 2024-04-22
Payer: MEDICARE

## 2024-04-22 DIAGNOSIS — C57.00 FALLOPIAN TUBE CANCER, CARCINOMA, UNSPECIFIED LATERALITY: Primary | ICD-10-CM

## 2024-04-22 DIAGNOSIS — G89.3 CANCER ASSOCIATED PAIN: ICD-10-CM

## 2024-04-22 RX ORDER — MORPHINE SULFATE 15 MG/1
15 TABLET, FILM COATED, EXTENDED RELEASE ORAL 2 TIMES DAILY
Qty: 60 TABLET | Refills: 0 | Status: SHIPPED | OUTPATIENT
Start: 2024-04-22 | End: 2024-05-09

## 2024-04-22 RX ORDER — DRONABINOL 5 MG/1
10 CAPSULE ORAL
Qty: 60 CAPSULE | Refills: 0 | Status: SHIPPED | OUTPATIENT
Start: 2024-04-22

## 2024-04-22 NOTE — TELEPHONE ENCOUNTER
No. Nor the Marinol. This needs to come from her oncologist or her palliative care team. I think maybe she just did not realize which office she was messaging, because the same provider has been prescribing these. Just give her a call and let her know we feel she contacted us meaning to contact them. No biggie. Thanks.

## 2024-04-25 NOTE — PROGRESS NOTES
Patient screened positive for food insecurity and was [facility]. First monthly visit completed today.

## 2024-04-30 ENCOUNTER — ANTI-COAG VISIT (OUTPATIENT)
Dept: CARDIOLOGY | Facility: CLINIC | Age: 64
End: 2024-04-30
Payer: MEDICARE

## 2024-04-30 ENCOUNTER — LAB VISIT (OUTPATIENT)
Dept: LAB | Facility: HOSPITAL | Age: 64
End: 2024-04-30
Attending: INTERNAL MEDICINE
Payer: MEDICARE

## 2024-04-30 ENCOUNTER — OFFICE VISIT (OUTPATIENT)
Dept: OPHTHALMOLOGY | Facility: CLINIC | Age: 64
End: 2024-04-30
Payer: MEDICARE

## 2024-04-30 DIAGNOSIS — Z79.01 WARFARIN ANTICOAGULATION: Primary | ICD-10-CM

## 2024-04-30 DIAGNOSIS — I82.5Z2 CHRONIC DEEP VEIN THROMBOSIS (DVT) OF DISTAL VEIN OF LEFT LOWER EXTREMITY: ICD-10-CM

## 2024-04-30 DIAGNOSIS — Z79.01 LONG TERM (CURRENT) USE OF ANTICOAGULANTS: ICD-10-CM

## 2024-04-30 DIAGNOSIS — E11.9 TYPE 2 DIABETES MELLITUS WITHOUT RETINOPATHY: Primary | ICD-10-CM

## 2024-04-30 DIAGNOSIS — H25.13 NUCLEAR SCLEROTIC CATARACT, BILATERAL: ICD-10-CM

## 2024-04-30 DIAGNOSIS — H04.123 DRY EYE SYNDROME, BILATERAL: ICD-10-CM

## 2024-04-30 DIAGNOSIS — I82.90 VTE (VENOUS THROMBOEMBOLISM): ICD-10-CM

## 2024-04-30 DIAGNOSIS — Z79.01 WARFARIN ANTICOAGULATION: ICD-10-CM

## 2024-04-30 LAB
INR PPP: 3 (ref 0.8–1.2)
PROTHROMBIN TIME: 31 SEC (ref 9–12.5)

## 2024-04-30 PROCEDURE — 1159F MED LIST DOCD IN RCRD: CPT | Mod: CPTII,S$GLB,, | Performed by: OPHTHALMOLOGY

## 2024-04-30 PROCEDURE — 2023F DILAT RTA XM W/O RTNOPTHY: CPT | Mod: CPTII,S$GLB,, | Performed by: OPHTHALMOLOGY

## 2024-04-30 PROCEDURE — 36415 COLL VENOUS BLD VENIPUNCTURE: CPT | Performed by: INTERNAL MEDICINE

## 2024-04-30 PROCEDURE — 1160F RVW MEDS BY RX/DR IN RCRD: CPT | Mod: CPTII,S$GLB,, | Performed by: OPHTHALMOLOGY

## 2024-04-30 PROCEDURE — 1157F ADVNC CARE PLAN IN RCRD: CPT | Mod: CPTII,S$GLB,, | Performed by: OPHTHALMOLOGY

## 2024-04-30 PROCEDURE — 92004 COMPRE OPH EXAM NEW PT 1/>: CPT | Mod: S$GLB,,, | Performed by: OPHTHALMOLOGY

## 2024-04-30 PROCEDURE — 85610 PROTHROMBIN TIME: CPT | Performed by: INTERNAL MEDICINE

## 2024-04-30 PROCEDURE — 93793 ANTICOAG MGMT PT WARFARIN: CPT | Mod: S$GLB,,,

## 2024-04-30 PROCEDURE — 99999 PR PBB SHADOW E&M-EST. PATIENT-LVL III: CPT | Mod: PBBFAC,,, | Performed by: OPHTHALMOLOGY

## 2024-04-30 NOTE — PROGRESS NOTES
[facility]    04/30/2024 12:48 PM    Assessment/Plan:    Patient presents today with supratherapeutic INR.    Assessment of patient findings and chart review: reviewed; pt reports increased wine intake 4/26    Recommendation for patient's warfarin regimen: Lower dose today to 3.75mg then resume current maintenance dose    Recommend repeat INR in 1 week  _________________________________________________________________    Mirna Quinteros (64 y.o.) is followed by the [facility].    Anticoagulation Summary  As of 4/30/2024      INR goal:  2.0-2.5   TTR:  18.4% (4.6 mo)   INR used for dosing:  3.0 (4/30/2024)   Warfarin maintenance plan:  0 mg every Wed; 3.75 mg (7.5 mg x 0.5) every Fri; 7.5 mg (7.5 mg x 1) all other days   Weekly warfarin total:  41.25 mg   Plan last modified:  Evon Sarmiento, PharmD (4/16/2024)   Next INR check:  5/7/2024   Target end date:      Indications    Warfarin anticoagulation [Z79.01]  Long term (current) use of anticoagulants [Z79.01]  Chronic deep vein thrombosis (DVT) of distal vein of left lower extremity [I82.5Z2]                 Anticoagulation Episode Summary       INR check location:  Anticoagulation Clinic    Preferred lab:      Send INR reminders to:  [facility] COUMADIN MONITORING POOL    Comments:  [facility] [place] Phone   [phone] Fax   [phone]          Anticoagulation Care Providers       Provider Role Specialty Phone number    Emil Burgess MD [facility] Medical Oncology [phone]            Patient Findings       Positives:  Change in alcohol use    Negatives:  Signs/symptoms of bleeding, Change in health, Change in activity, Upcoming invasive procedure, Emergency department visit, Upcoming dental procedure, Missed doses, Extra doses, Change in medications, Change in diet/appetite, Hospital admission, Bruising    Comments:  Patient confirmed correct warfarin dosing per  calendar  Had a few wine coolers for her birthday 4/26/24

## 2024-04-30 NOTE — PROGRESS NOTES
HPI    New pt here for DM eye exam. States DM has been under control. Denies   pain/ FOL/ floaters.     Watery OU. No gtts.       Hemoglobin A1C       Date                     Value               Ref Range             Status                12/13/2023               7.2 (H)             4.5 - 6.2 %           Final                 05/12/2023               7.1 (H)             <=6.5 %               Final                 01/17/2023               8.6 (H)             4.8 - 5.6 %           Final                 02/12/2014               7.1 (H)             4.5 - 6.2 %           Final                 05/14/2013               6.5 (H)             4.0 - 6.2 %           Final            ----------    Last edited by Oksana Owusu on 4/30/2024  9:43 AM.            Assessment /Plan     For exam results, see Encounter Report.    Type 2 diabetes mellitus without retinopathy  -     Ambulatory referral/consult to Ophthalmology    Dry eye syndrome, bilateral    Nuclear sclerotic cataract, bilateral      1. Type 2 diabetes mellitus without retinopathy  Diabetes without retinopathy, discussed with patient importance of glucose control and follow up.  Patient voices understanding.    2. Dry eye syndrome, bilateral  Mild, limited symptoms  ATs PRN    3. Nuclear sclerotic cataract, bilateral  Moderate, pt not bothered  Observe    F/u 1 year, routine exam

## 2024-05-06 ENCOUNTER — TELEPHONE (OUTPATIENT)
Dept: HEMATOLOGY/ONCOLOGY | Facility: CLINIC | Age: 64
End: 2024-05-06

## 2024-05-06 NOTE — PROGRESS NOTES
The patient location is: [place]  The chief complaint leading to consultation is: food insecurity, neuropathy     Visit type: audiovisual    Face to Face time with patient: 18 minutes   30 minutes of total time spent on the encounter, which includes face to face time and non-face to face time preparing to see the patient (eg, review of tests), Obtaining and/or reviewing separately obtained history, Documenting clinical information in the electronic or other health record, Independently interpreting results (not separately reported) and communicating results to the patient/family/caregiver, or Care coordination (not separately reported).     Each patient to whom he or she provides medical services by telemedicine is:  (1) informed of the relationship between the physician and patient and the respective role of any other health care provider with respect to management of the patient; and (2) notified that he or she may decline to receive medical services by telemedicine and may withdraw from such care at any time.    Oncology Nutrition Assessment for Medical Nutrition Therapy  Initial Visit    Mirna Quinteros   1960    Referring Provider:  Emil Burgess MD      Reason for Visit: Pt in for education and nutrition counseling     PMHx:   Past Medical History:   Diagnosis Date    Arthritis     Asthma     C. difficile colitis     Depression     Diabetes mellitus, type 2     Diabetic neuropathy     DVT (deep venous thrombosis)     Enteritis due to Norovirus 02/22/2019    Feeding difficulty in adult 11/22/2021    Formatting of this note might be different from the original. Added automatically from request for surgery [id number] Last Assessment & Plan: Formatting of this note might be different from the original. · Patient with history of ovarian cancer s/p MARCELO/BSO, omentectomy, radical tumor debulking, cytoreduction, appendectomy, partial hepatectomy, and HIPEC per Dr. Forrester and Dr. Jordan 12/13 with postope     "GERD (gastroesophageal reflux disease)     Hyperlipidemia     Hypertension     Internal hemorrhoids     Pancreatitis 09/23/2018    Last Assessment & Plan: Formatting of this note might be different from the original. Mirna is a  58 year old female presenting with abdominal pain.  CT abd/pevis w/o contrast revealed acute pancreatitis involving pancreatic head with no fluid collections/necrosis.  EGD from 9/24/2018 unremarkable. Assessment: Today patient is sitting up and interactive.  No acute stress obvious.   Reports pain in    Pulmonary embolism     Respiratory failure with hypoxia 10/06/2021    Rheumatoid arthritis(714.0)     Right upper quadrant pain 08/22/2021    SOB (shortness of breath) 02/24/2023       Nutrition Assessment    Patient is a 64 y.o.female with a history of fallopian tube cancer s/p debulking, total hysterectomy and bilateral salpingo-oophorectomy 12/2021. Referred to nutrition due to weight loss, appetite loss, constipation. PMH includes DM2, HTN, HLD, RA. She reports constipation has improved with pelvic physical therapy. Her appetite is fair. She eats about once per day. She has not had any recent weight loss. She does report food insecurity. She has used the [facility] food pantry but has limitations due to lack of transportation.   She reports neuropathy in hands and feet. Some days are worse than others.   Reports tracking her blood glucose using a freestyle tavares. Reports her blood sugar has been well controlled. Occasional hypoglycemai. Reports she was told to eat a teaspoon of jelly when needed. Also finds a little oatmeal helps.     Weight:108.9 kg (240 lb)  Height:5' 7" (1.702 m)  BMI:Body mass index is 37.59 kg/m².   IBW: Ideal body weight: 61.6 kg (135 lb 12.9 oz)  Adjusted ideal body weight: 80.5 kg (177 lb 7.7 oz)    Allergies: Liraglutide, Empagliflozin, Glyburide, Metformin, Pioglitazone, and Iohexol    Current Medications:    Current Outpatient Medications:     aspirin 81 mg " "Cap, Take 81 mg by mouth once daily., Disp: , Rfl:     BD INSULIN PEN NEEDLE UF MINI 31 x 3/16 " Ndle, , Disp: , Rfl: 0    droNABinol (MARINOL) 5 MG capsule, Take 2 capsules (10 mg total) by mouth 2 (two) times daily before meals., Disp: 60 capsule, Rfl: 0    ezetimibe (ZETIA) 10 mg tablet, Take 1 tablet (10 mg total) by mouth once daily., Disp: 90 tablet, Rfl: 2    flash glucose sensor (FREESTYLE LUC 2 SENSOR) Kit, 1 each by Misc.(Non-Drug; Combo Route) route as needed., Disp: 1 kit, Rfl: 6    furosemide (LASIX) 40 MG tablet, Take 1 tablet (40 mg total) by mouth 2 (two) times a day., Disp: 180 tablet, Rfl: 3    hydrOXYzine HCL (ATARAX) 10 MG Tab, Take 10 mg by mouth 3 (three) times daily as needed., Disp: , Rfl:     insulin aspart (NOVOLOG FLEXPEN) 100 unit/mL InPn, Inject 8 Units into the skin 3 (three) times daily with meals., Disp: 1 Box, Rfl: 6    insulin degludec (TRESIBA FLEXTOUCH U-100) 100 unit/mL (3 mL) insulin pen, Inject 32 Units into the skin., Disp: , Rfl:     lansoprazole (PREVACID) 30 MG capsule, TAKE 1 CAPSULE ONE TIME DAILY, Disp: 90 capsule, Rfl: 3    linaCLOtide (LINZESS) 72 mcg Cap capsule, TAKE 1 CAPSULE (72 MCG TOTAL) BY MOUTH BEFORE BREAKFAST, Disp: 90 capsule, Rfl: 3    mirtazapine (REMERON) 15 MG tablet, Take 15 mg by mouth every evening., Disp: , Rfl:     montelukast (SINGULAIR) 10 mg tablet, Take 1 tablet (10 mg total) by mouth every evening., Disp: 90 tablet, Rfl: 1    morphine (MS CONTIN) 30 MG 12 hr tablet, Take 1 tablet (30 mg total) by mouth 2 (two) times daily., Disp: 60 tablet, Rfl: 0    MOUNJARO 2.5 mg/0.5 mL PnIj, Inject 2.5 mg into the skin once a week., Disp: 4 Pen, Rfl: 3    ondansetron (ZOFRAN) 4 MG tablet, Take 1 tablet (4 mg total) by mouth every 8 (eight) hours as needed (Nausea and vomiting)., Disp: 12 tablet, Rfl: 0    oxyCODONE-acetaminophen (PERCOCET) 7.5-325 mg per tablet, Take 1 tablet by mouth every 6 (six) hours as needed for Pain., Disp: 120 tablet, Rfl: 0    " rosuvastatin (CRESTOR) 40 MG Tab, Take 20 mg by mouth once daily., Disp: , Rfl:     sertraline (ZOLOFT) 100 MG tablet, Take by mouth., Disp: , Rfl:     sumatriptan (IMITREX) 50 MG tablet, Take by mouth., Disp: , Rfl:     SYMBICORT 160-4.5 mcg/actuation HFAA, Inhale 2 puffs into the lungs every 12 (twelve) hours., Disp: , Rfl:     tiZANidine 4 mg Cap, Take 4 mg by mouth 2 (two) times daily as needed., Disp: , Rfl:     valsartan-hydrochlorothiazide (DIOVAN-HCT) 80-12.5 mg per tablet, Take 1 tablet by mouth once daily., Disp: , Rfl:     warfarin (COUMADIN) 7.5 MG tablet, Mon, Tue, Thurs, Fri, Sat, Sun. (Daily EXCEPT Wednesday), Disp: 60 tablet, Rfl: 3    ZEJULA 100 mg Tab, Take 1 tablet by mouth every morning., Disp: , Rfl:     Vitamins/Supplements: folic acid     Labs: Reviewed from 4/16/24    Hemoglobin A1C   Date Value Ref Range Status   12/13/2023 7.2 (H) 4.5 - 6.2 % Final     Comment:     According to ADA guidelines, hemoglobin A1C <7.0% represents  optimal control in non-pregnant diabetic patients.  Different  metrics may apply to specific populations.   Standards of Medical Care in Diabetes - 2016.    For the purpose of screening for the presence of diabetes:  <5.7%     Consistent with the absence of diabetes  5.7-6.4%  Consistent with increasing risk for diabetes   (prediabetes)  >or=6.5%  Consistent with diabetes    Currently no consensus exists for use of hemoglobin A1C  for diagnosis of diabetes for children.     08/11/2023 8.1 (H) 4.8 - 5.6 % Final     Comment:              Prediabetes: 5.7 - 6.4           Diabetes: >6.4           Glycemic control for adults with diabetes: <7.0   05/12/2023 7.1 (H) <=6.5 % Final   01/17/2023 8.6 (H) 4.8 - 5.6 % Final     Comment:              Prediabetes: 5.7 - 6.4           Diabetes: >6.4           Glycemic control for adults with diabetes: <7.0   02/12/2014 7.1 (H) 4.5 - 6.2 % Final   05/14/2013 6.5 (H) 4.0 - 6.2 % Final         Nutrition Diagnosis    Problem: inadequate  protein/energy intake  Etiology (related to): appetite loss  and food insecurity   Signs/Symptoms (as evidenced by):  one meal per day, reports of inadequate food access     Nutrition Intervention    Nutrition Prescription   1650 Kcals (15kcal/kg)  88 g protein (0.8g/kg)   2700 mL fluid (25mL/kg)    Recommendations:  Try consistent meal times   -3-4 small meals per day   -protein with each meal   Glucerna/Boost glucose control if not hungry   ALA for neuropathy   -take separate from diabetes medication   -reviewed low cost options     Materials Provided/Reviewed   Will work with social work on additional assistance options   Reviewed food pantry use with patient     Nutrition Monitoring and Evaluation    Monitor: energy intake, labs (A1c), and diet education needs     Goals: consistent meals, improved food access    Follow up Patient will follow up via portal as needed with any questions/concerns     Communication to referring provider/care team: note available in chart     Counseling time: 15 Minutes    Felecia Romero, MPH, RD, , LDN, FAND  Board Certified Specialist in Oncology Nutrition   [phone]

## 2024-05-06 NOTE — TELEPHONE ENCOUNTER
Called patient when she did not sign on within 10 minutes of scheduled virtual visit. Patient reports forgetting about apt but would appreciate rescheduling. Rescheduled patient to 5/27 at 8:30am per her preference.

## 2024-05-07 ENCOUNTER — ANTI-COAG VISIT (OUTPATIENT)
Dept: CARDIOLOGY | Facility: CLINIC | Age: 64
End: 2024-05-07
Payer: MEDICARE

## 2024-05-07 ENCOUNTER — LAB VISIT (OUTPATIENT)
Dept: LAB | Facility: HOSPITAL | Age: 64
End: 2024-05-07
Attending: INTERNAL MEDICINE
Payer: MEDICARE

## 2024-05-07 DIAGNOSIS — Z79.01 LONG TERM (CURRENT) USE OF ANTICOAGULANTS: ICD-10-CM

## 2024-05-07 DIAGNOSIS — Z79.01 WARFARIN ANTICOAGULATION: Primary | ICD-10-CM

## 2024-05-07 DIAGNOSIS — I82.90 VTE (VENOUS THROMBOEMBOLISM): ICD-10-CM

## 2024-05-07 DIAGNOSIS — I82.5Z2 CHRONIC DEEP VEIN THROMBOSIS (DVT) OF DISTAL VEIN OF LEFT LOWER EXTREMITY: ICD-10-CM

## 2024-05-07 DIAGNOSIS — Z79.01 WARFARIN ANTICOAGULATION: ICD-10-CM

## 2024-05-07 LAB
INR PPP: 1.6 (ref 0.8–1.2)
PROTHROMBIN TIME: 17.6 SEC (ref 9–12.5)

## 2024-05-07 PROCEDURE — 93793 ANTICOAG MGMT PT WARFARIN: CPT | Mod: S$GLB,,,

## 2024-05-07 PROCEDURE — 36415 COLL VENOUS BLD VENIPUNCTURE: CPT | Performed by: INTERNAL MEDICINE

## 2024-05-07 PROCEDURE — 85610 PROTHROMBIN TIME: CPT | Performed by: INTERNAL MEDICINE

## 2024-05-07 NOTE — PROGRESS NOTES
[facility]    2024 10:16 AM    Assessment/Plan:    Patient presents today with subtherapeutic  INR.    Assessment of patient findings and chart review: reviewed    Recommendation for patient's warfarin regimen: Boost dose today to 11.25mg then resume current maintenance dose    Recommend repeat INR in 1 week  _________________________________________________________________    Mirna Quinteros (64 y.o.) is followed by the [facility].    Anticoagulation Summary  As of 2024      INR goal:  2.0-2.5   TTR:  19.2% (4.8 mo)   INR used for dosin.6 (2024)   Warfarin maintenance plan:  0 mg every Wed; 3.75 mg (7.5 mg x 0.5) every Fri; 7.5 mg (7.5 mg x 1) all other days   Weekly warfarin total:  41.25 mg   Plan last modified:  Evon Sarmiento, PharmD (2024)   Next INR check:  2024   Target end date:      Indications    Warfarin anticoagulation [Z79.01]  Long term (current) use of anticoagulants [Z79.01]  Chronic deep vein thrombosis (DVT) of distal vein of left lower extremity [I82.5Z2]                 Anticoagulation Episode Summary       INR check location:  Anticoagulation Clinic    Preferred lab:      Send INR reminders to:  [facility] COUMADIN MONITORING POOL    Comments:  [facility] [place] Phone   [phone] Fax   [phone]          Anticoagulation Care Providers       Provider Role Specialty Phone number    Emil Burgess MD [facility] Medical Oncology [phone]            Patient Findings       Negatives:  Signs/symptoms of bleeding, Change in health, Change in alcohol use, Change in activity, Upcoming invasive procedure, Emergency department visit, Upcoming dental procedure, Missed doses, Extra doses, Change in medications, Change in diet/appetite, Hospital admission, Bruising    Comments:  Patient confirmed correct warfarin dosing per calendar.   No changes to report

## 2024-05-08 ENCOUNTER — PATIENT MESSAGE (OUTPATIENT)
Dept: PALLIATIVE MEDICINE | Facility: CLINIC | Age: 64
End: 2024-05-08
Payer: MEDICARE

## 2024-05-08 DIAGNOSIS — C57.00 FALLOPIAN TUBE CANCER, CARCINOMA, UNSPECIFIED LATERALITY: ICD-10-CM

## 2024-05-08 RX ORDER — DRONABINOL 5 MG/1
10 CAPSULE ORAL
Qty: 60 CAPSULE | Refills: 0 | OUTPATIENT
Start: 2024-05-08

## 2024-05-09 ENCOUNTER — TELEPHONE (OUTPATIENT)
Dept: PALLIATIVE MEDICINE | Facility: CLINIC | Age: 64
End: 2024-05-09
Payer: MEDICARE

## 2024-05-09 RX ORDER — MORPHINE SULFATE 30 MG/1
30 TABLET, FILM COATED, EXTENDED RELEASE ORAL 2 TIMES DAILY
Qty: 60 TABLET | Refills: 0 | Status: CANCELLED | OUTPATIENT
Start: 2024-05-09

## 2024-05-09 NOTE — TELEPHONE ENCOUNTER
"Spoke with patient at Chasity Morris DNP request to do a check in on pain and appetite. Patient rate her pain a "9 to 9.5" for her LE and abd before taking pain medication but after she takes her pain meds she rate her "relief" as "7.5-8/10"; states she feel her pain is "getting worse"; takes morphine 15 mg Q12H daily but she mentioned that it's supposed to be 30 mg Q 12H daily; as for the oxycodone she takes 1 tab in AM, 1 in PM, and another as needed after activity; remeron she doesn't recall taking but she said she will ask her home health nurse. YOHANA Morris notified. YOHANA Morris advises that it is ok for patient to take 30 mg of MS Contin Q12H. Patient notified.  "

## 2024-05-13 DIAGNOSIS — G89.3 CANCER ASSOCIATED PAIN: ICD-10-CM

## 2024-05-13 RX ORDER — OXYCODONE AND ACETAMINOPHEN 7.5; 325 MG/1; MG/1
1 TABLET ORAL EVERY 6 HOURS PRN
Qty: 120 TABLET | Refills: 0 | Status: SHIPPED | OUTPATIENT
Start: 2024-05-13 | End: 2024-06-12

## 2024-05-13 RX ORDER — MORPHINE SULFATE 30 MG/1
30 TABLET, FILM COATED, EXTENDED RELEASE ORAL 2 TIMES DAILY
Qty: 60 TABLET | Refills: 0 | Status: SHIPPED | OUTPATIENT
Start: 2024-05-13 | End: 2024-06-13 | Stop reason: SDUPTHER

## 2024-05-14 ENCOUNTER — LAB VISIT (OUTPATIENT)
Dept: LAB | Facility: HOSPITAL | Age: 64
End: 2024-05-14
Attending: INTERNAL MEDICINE
Payer: MEDICARE

## 2024-05-14 ENCOUNTER — CLINICAL SUPPORT (OUTPATIENT)
Dept: REHABILITATION | Facility: HOSPITAL | Age: 64
End: 2024-05-14
Attending: INTERNAL MEDICINE
Payer: MEDICARE

## 2024-05-14 ENCOUNTER — ANTI-COAG VISIT (OUTPATIENT)
Dept: CARDIOLOGY | Facility: CLINIC | Age: 64
End: 2024-05-14
Payer: MEDICARE

## 2024-05-14 DIAGNOSIS — I82.90 VTE (VENOUS THROMBOEMBOLISM): ICD-10-CM

## 2024-05-14 DIAGNOSIS — Z79.01 LONG TERM (CURRENT) USE OF ANTICOAGULANTS: ICD-10-CM

## 2024-05-14 DIAGNOSIS — Z79.01 WARFARIN ANTICOAGULATION: Primary | ICD-10-CM

## 2024-05-14 DIAGNOSIS — I82.5Z2 CHRONIC DEEP VEIN THROMBOSIS (DVT) OF DISTAL VEIN OF LEFT LOWER EXTREMITY: ICD-10-CM

## 2024-05-14 DIAGNOSIS — R10.9 ABDOMINAL PAIN: ICD-10-CM

## 2024-05-14 DIAGNOSIS — Z79.01 WARFARIN ANTICOAGULATION: ICD-10-CM

## 2024-05-14 DIAGNOSIS — M62.89 PELVIC FLOOR DYSFUNCTION: Primary | ICD-10-CM

## 2024-05-14 LAB
INR PPP: 1.6 (ref 0.8–1.2)
PROTHROMBIN TIME: 17.1 SEC (ref 9–12.5)

## 2024-05-14 PROCEDURE — 97530 THERAPEUTIC ACTIVITIES: CPT | Mod: PO

## 2024-05-14 PROCEDURE — 97162 PT EVAL MOD COMPLEX 30 MIN: CPT | Mod: PO

## 2024-05-14 PROCEDURE — 85610 PROTHROMBIN TIME: CPT | Performed by: INTERNAL MEDICINE

## 2024-05-14 PROCEDURE — 93793 ANTICOAG MGMT PT WARFARIN: CPT | Mod: S$GLB,,,

## 2024-05-14 PROCEDURE — 36415 COLL VENOUS BLD VENIPUNCTURE: CPT | Performed by: INTERNAL MEDICINE

## 2024-05-14 NOTE — PATIENT INSTRUCTIONS
"1) Bowel function - consider the following recommendations for optimizing bowel function. Good bowel health is important for overall pelvic floor function.     Adequate fluid intake is necessary for proper bowel function. Goal this week is to drink 64 ounces/day. Try completing your water bottle one time by lunch and second bottle by dinner time.     Fiber adds bulk to stool and promotes more frequent and regular bowel movements. Strategies to increase fiber intake:  One cup of high fiber cereal every day.  1/2 to 1 cup of prune juice or several stewed prunes every day, followed by a cup of hot water or tea.   2 cups of fruit or 2 1/2 cups vegetables, 6-8 oz high fiber grains daily  Fiber supplements, I.e. Metamucil, Citrucel, Konsyl, Benefiber  2 tablespoons of flax seed meal (can be added to cereal, smoothies, yogurt, oatmeal, etc)     Positioning and techniques for elimination     Bowel Movement Mechanics  1. Sit on the toilet comfortably with legs and buttocks relaxed.  2. Put your feet on a waste basket or squatty potty  3. Lean forward while keeping your back straight, forearms rest on knees.  4. Keep your knees apart.  5. Fully relax pelvic floor muscles - think about softening, opening, dropping  6. Use gentle belly breaths and bulge lower abdominals like making a beer belly  7. Keep belly big on exhalation  8. Exhale like blowing out birthday candles  9. Keep breathing like this through entire bowel movement  10. Make sure to give enough time, ok for it to take several minutes     Do not strain and Do not hold your breath.       (Search "Squatty Potty" on [employer])        ________________________________________________________    Home Exercise Program: 05/14/2024    BELLY BREATHING     The diaphragm is a dome shaped muscle that forms the floor of the rib cage. It is the most efficient muscle for breathing and relaxation, although most people are not used to using the diaphragm. Diaphragmatic or belly " breathing is an important technique to learn because it helps settle down or relax the autonomic nervous system. The correct use of diaphragmatic breathing can help to quiet brain activity resulting in the relaxation of all the muscles and organs of the body. This is accomplished by slow rhythmic breathing concentrated in the diaphragm muscle rather than the chest.    How to do proper relaxation breathing:    Start by lying on your back or reclining in a chair in a relaxed position. Place one hand on your chest and the other on your abdomen.  Relax your jaw by placing your tongue on the floor of your mouth and keeping your teeth slightly apart.   Take a deep breath in, letting the abdomen expand and rise while you keep your upper chest, neck and shoulders relaxed.   As you breathe out, allow your abdomen and chest to fall. Exhale completely.  It doesn't matter if you breathe in/out through your nose and/or mouth. Do whichever feels comfortable.  Remember to breathe slowly.  Do not force your breathing. Do not hold your breath.  Repeat for 5 minutes, 2-3 times every day.

## 2024-05-14 NOTE — PROGRESS NOTES
[facility]    2024 12:22 PM    Assessment/Plan:    Patient presents today with subtherapeutic  INR.    Assessment of patient findings and chart review: pt did not boost last week as advised, instead took higher dose this past ,     Recommendation for patient's warfarin regimen: Continue current maintenance dose as likely have not seen full effect of boost dose yet    Recommend repeat INR in 1 week  _________________________________________________________________    Mirna Quinteros (64 y.o.) is followed by the [facility].    Anticoagulation Summary  As of 2024      INR goal:  2.0-2.5   TTR:  18.3% (5 mo)   INR used for dosin.6 (2024)   Warfarin maintenance plan:  0 mg every Wed; 3.75 mg (7.5 mg x 0.5) every Fri; 7.5 mg (7.5 mg x 1) all other days   Weekly warfarin total:  41.25 mg   Plan last modified:  Evon Sarmiento, PharmD (2024)   Next INR check:  2024   Target end date:      Indications    Warfarin anticoagulation [Z79.01]  Long term (current) use of anticoagulants [Z79.01]  Chronic deep vein thrombosis (DVT) of distal vein of left lower extremity [I82.5Z2]                 Anticoagulation Episode Summary       INR check location:  Anticoagulation Clinic    Preferred lab:      Send INR reminders to:  [facility] COUMADIN MONITORING POOL    Comments:  [facility] [place] Phone   [phone] Fax   [phone]          Anticoagulation Care Providers       Provider Role Specialty Phone number    Emil Burgess MD [facility] Medical Oncology [phone]            Patient Findings       Negatives:  Signs/symptoms of bleeding, Change in health, Change in alcohol use, Change in activity, Upcoming invasive procedure, Emergency department visit, Upcoming dental procedure, Missed doses, Extra doses, Change in medications, Change in diet/appetite, Hospital admission, Bruising     Comments:  Incorrect dose taken: pt took 11.25 mg on Sunday 5/12 and took 7.5 mg on Tuesday 5/7  All other doses taken per calendar, dose held on Wed 5/8 per calendar  No changes to report to diet or medication

## 2024-05-14 NOTE — PROGRESS NOTES
[facility]  Pelvic Health Physical Therapy Initial Evaluation    Date: 2024   Name: Mirna Quinteros  [facility] Number: [id number]  Therapy Diagnosis:   Encounter Diagnoses   Name Primary?    Abdominal pain     Pelvic floor dysfunction Yes     Physician: Abbie Still MD    Physician Orders: PT Eval and Treat   Medical Diagnosis from Referral:  Abdominal pain [R10.9]   Evaluation Date: 2024  Authorization Period Expiration: 2024  Plan of Care Expiration: 2024  Visit # / Visits authorized:     FOTO 1 /3 on 2024      Time In: 11:30  Time Out: 12:15  Total Appointment Time (timed & untimed codes): 45 minutes    Precautions:   2021 - RADICAL TUMOR DEBULKING INCLUDING TOTAL ABDOMINAL HYSTERECTOMY, BILATERAL SALPINGO-OOPHORECTOMY, OMENTECOMY        Subjective     Date of onset: 2021    History of current condition - Mirna reports: having pain, about every 15 min. Sometimes when she's walking, or when she's sitting still.   Movement, sitting, eating, laying down - any of that can make her stomach hurt.   Since surgery she has to have the bathroom be really quiet and has to sit on the toilet for 20-30 minutes. Bowels have been really messed up since the surgery     OB/GYN History:  and vaginal delivery    Pain:  Location: periumbilical   Current 7/10, worst 10/10, best 6/10   Aggravating Factors/Activities that cause symptoms: unable to determine    Easing Factors: unable to determine     Bladder/Bowel History:   Frequency of urination:   Daytime: sometimes feels like she needs to use the bathroom but then she doesn't; has to void often because she takes diuretics 2x/day           Bladder leakage: Yes - urge urinary incontinence   Frequency of incidents: throughout the day   Amount leaked (urine): small squirt   Urinary Urgency: Yes  Frequency of bowel movements: every 2-4 weeks   Difficulty initiating BM: Yes  Quality/Shape of BM: [place] Stool Chart type 1    Does Patient Feel Empty after BM? No  Fiber Supplements or Laxative Use? Miralax, stool softeners - no improvements in bowel function   Colon leakage: Yes  Frequency of incidents: almost every night - happens when she's sleeping, has a lot of stool come out when she's sleeping.    Amount leaked (bowels): large amount  Form of protection: pad - sometimes if they're going out of the house for a while  Number of pads required in 24 hours: 1-2        Medical History: Mirna  has a past medical history of Arthritis, Asthma, C. difficile colitis, Depression, Diabetes mellitus, type 2, Diabetic neuropathy, DVT (deep venous thrombosis), Enteritis due to Norovirus (02/22/2019), Feeding difficulty in adult (11/22/2021), GERD (gastroesophageal reflux disease), Hyperlipidemia, Hypertension, Internal hemorrhoids, Pancreatitis (09/23/2018), Pulmonary embolism, Respiratory failure with hypoxia (10/06/2021), Rheumatoid arthritis(714.0), Right upper quadrant pain (08/22/2021), and SOB (shortness of breath) (02/24/2023).     Surgical History: Mirna Quinteros  has a past surgical history that includes Cholecystectomy; Tubal ligation; Knee surgery; Knee arthroscopy; Carpal tunnel release (Left); Colonoscopy; Upper gastrointestinal endoscopy; and Colonoscopy (N/A, 4/9/2024).    Medications: Mirna has a current medication list which includes the following prescription(s): aspirin, bd ultra-fine mini pen needle, dronabinol, ezetimibe, freestyle tavares 2 sensor, furosemide, hydroxyzine hcl, insulin aspart u-100, insulin degludec, lansoprazole, linzess, mirtazapine, montelukast, morphine, mounjaro, ondansetron, oxycodone-acetaminophen, rosuvastatin, sertraline, sumatriptan, symbicort, tizanidine, valsartan-hydrochlorothiazide, warfarin, and zejula.    Allergies:   Review of patient's allergies indicates:   Allergen Reactions    Liraglutide Other (See Comments)     pancreatitis    Empagliflozin      Other reaction(s): yeast infxn    Glyburide       Other reaction(s): unknown    Metformin      Other reaction(s): GI upset    Pioglitazone      Other reaction(s): Swelling    Iohexol Itching     Patient given 50mg benadryl im and itching subsided        Prior Therapy/Previous treatment included: none   Prior Level of Function: independent   Current Level of Function: see above       Pts goals: resovle pain     OBJECTIVE     Informed verbal consent provided 5/14/2024 prior to exam.  Chaperone: daughter present       ABDOMINAL WALL ASSESSMENT  Palpation: tender and boggy  Abdominal coordination: able to produce eccentric transverse abdominis and maintain with exhale   Scarring: midline surgical scar noted with tissue restriction   Pelvic Floor Muscle and Transverse Abdominus Synergy: diminished     BREATHING MECHANICS ASSESSMENT   Thorax Assessment During Quiet Respiration: WNL excursion of ribcage and WNL excursion of abdominal wall  Thorax Assessment During Deep Respiration: Decreased excursion bilaterally of lateral ribs  and Decreased excursion of abdominal wall       Limitation/Restriction for FOTO Pelvic Survey    Therapist reviewed FOTO scores for Mirna Quinteros on 5/14/2024.   FOTO documents entered into [employer] - see Media section.       TREATMENT     Total Treatment time (time-based codes) separate from Evaluation: 30 minutes      Therapeutic Activity Patient participated in dynamic functional therapeutic activities to improve functional performance for 30 minutes. Including: Education as described below:     Patient Education provided:   general anatomy/physiology of urinary/ bowel  system and benefits of treatment were discussed with the pt. Additionally, anatomy/physiology of pelvic floor, diaphragmatic breathing, and proper bearing down techniques were reviewed.     Home Exercises provided:  Written Home Exercises provided: yes.  Exercises were reviewed and Mirna was able to demonstrate them prior to the end of the session.    Mirna demonstrated good   understanding of the education provided.     See EMR under Patient Instructions for exercises provided 5/14/2024.    Assessment     Mirna is a 64 y.o. female referred to outpatient Physical Therapy with a medical diagnosis of Abdominal pain [R10.9] . Pt presents with poor knowledge of body mechanics and posture, adhered abdominal scar, poor trunk stability, decreased pelvic muscle strength, decreased endurance of the pelvic muscles, decreased phasic ability of the pelvic muscles, increased tension of the pelvic muscles, poor quality of pelvic muscle contraction, increased frequency of urination, poor coordination of pelvic floor muscles during ADL's leading to urinary or fecal leakage, dysfunctional voiding, dysfunctional defecation, and unable to co-contract or co-relax abdominal wall and pelvic floor muscles.      Pt prognosis is Good.   Pt will benefit from skilled outpatient Physical Therapy to address the deficits stated above and in the chart below, provide pt/family education, and to maximize pt's level of independence.     Plan of care discussed with patient: Yes  Pt's spiritual, cultural and educational needs considered and patient is agreeable to the plan of care and goals as stated below:     Anticipated Barriers for therapy: co-morbidities     Medical Necessity is demonstrated by the following  History  Co-morbidities and personal factors that may impact the plan of care [] LOW: no personal factors / co-morbidities  [x] MODERATE: 1-2 personal factors / co-morbidities  [] HIGH: 3+ personal factors / co-morbidities    Moderate / High Support Documentation:   Co-morbidities affecting plan of care:  has a past medical history of Arthritis, Asthma, C. difficile colitis, Depression, Diabetes mellitus, type 2, Diabetic neuropathy, DVT (deep venous thrombosis), Enteritis due to Norovirus (02/22/2019), Feeding difficulty in adult (11/22/2021), GERD (gastroesophageal reflux disease), Hyperlipidemia, Hypertension,  Internal hemorrhoids, Pancreatitis (09/23/2018), Pulmonary embolism, Respiratory failure with hypoxia (10/06/2021), Rheumatoid arthritis(714.0), Right upper quadrant pain (08/22/2021), and SOB (shortness of breath) (02/24/2023).     Personal Factors:   None      Examination  Body Structures and Functions, activity limitations and participation restrictions that may impact the plan of care [] LOW: addressing 1-2 elements  [x] MODERATE: 3+ elements  [] HIGH: 4+ elements (please support below)    Moderate / High Support Documentation: see evaluation     Clinical Presentation [] LOW: stable  [x] MODERATE: Evolving  [] HIGH: Unstable     Decision Making/ Complexity Score: moderate         Goals:  Short Term Goals: 6 weeks   - Pt will demonstrate excellent knowledge and adherence to HEP to facilitate optimal recovery.  - Pt will demonstrate proper PFM contraction, relaxation, and lengthening coordinated with TA and breath for improved muscle coordination needed for functional activity.    Long Term Goals: 12 weeks   - Pt will demonstrate excellent knowledge and adherence to HEP for continued self-maintenance of symptoms.  - Pt will report FOTO score of 10% improvement or more indicating clinically relevant increase in function.  - Pt will report ability to delay urinary urge for at least 10 minutes to maintain continence with ADL/IADLs.   - Pt will report little (drops) to no incidence of urinary incontinence 7/7 days for improved hygiene and ADL/IADL tolerance.   - Pt will demonstrate PFM strength of at least 3/5 MMT for improved strength needed to maintain continence.   - Pt will report bearing down appropriately 100% of the time for improved bowel function and decreased stress on adjacent pelvic structures.   - Pt will demonstrate independence with pressure-management strategies to decreased stress on adjacent pelvic structures.       Plan     Plan of care Certification: 5/14/2024 to 8/14/2024.    Outpatient Physical  Therapy 1 times weekly for 12 weeks to include the following interventions: therapeutic exercises, therapeutic activity, neuromuscular re-education, manual therapy, modalities PRN, patient/family education, dry needling, and self care/home management    Dolly Izquierdo, PT, DPT, WCS

## 2024-05-16 PROBLEM — M62.89 PELVIC FLOOR DYSFUNCTION: Status: ACTIVE | Noted: 2024-05-16

## 2024-05-16 NOTE — PLAN OF CARE
[facility]  Pelvic Health Physical Therapy Initial Evaluation    Date: 2024   Name: Mirna Quinteros  [facility] Number: [id number]  Therapy Diagnosis:   Encounter Diagnoses   Name Primary?    Abdominal pain     Pelvic floor dysfunction Yes     Physician: Abbie Still MD    Physician Orders: PT Eval and Treat   Medical Diagnosis from Referral:  Abdominal pain [R10.9]   Evaluation Date: 2024  Authorization Period Expiration: 2024  Plan of Care Expiration: 2024  Visit # / Visits authorized:     FOTO 1 /3 on 2024      Time In: 11:30  Time Out: 12:15  Total Appointment Time (timed & untimed codes): 45 minutes    Precautions:   2021 - RADICAL TUMOR DEBULKING INCLUDING TOTAL ABDOMINAL HYSTERECTOMY, BILATERAL SALPINGO-OOPHORECTOMY, OMENTECOMY        Subjective     Date of onset: 2021    History of current condition - Miran reports: having pain, about every 15 min. Sometimes when she's walking, or when she's sitting still.   Movement, sitting, eating, laying down - any of that can make her stomach hurt.   Since surgery she has to have the bathroom be really quiet and has to sit on the toilet for 20-30 minutes. Bowels have been really messed up since the surgery     OB/GYN History:  and vaginal delivery    Pain:  Location: periumbilical   Current 7/10, worst 10/10, best 6/10   Aggravating Factors/Activities that cause symptoms: unable to determine    Easing Factors: unable to determine     Bladder/Bowel History:   Frequency of urination:   Daytime: sometimes feels like she needs to use the bathroom but then she doesn't; has to void often because she takes diuretics 2x/day           Bladder leakage: Yes - urge urinary incontinence   Frequency of incidents: throughout the day   Amount leaked (urine): small squirt   Urinary Urgency: Yes  Frequency of bowel movements: every 2-4 weeks   Difficulty initiating BM: Yes  Quality/Shape of BM: [place] Stool Chart type 1    Does Patient Feel Empty after BM? No  Fiber Supplements or Laxative Use? Miralax, stool softeners - no improvements in bowel function   Colon leakage: Yes  Frequency of incidents: almost every night - happens when she's sleeping, has a lot of stool come out when she's sleeping.    Amount leaked (bowels): large amount  Form of protection: pad - sometimes if they're going out of the house for a while  Number of pads required in 24 hours: 1-2        Medical History: Mirna  has a past medical history of Arthritis, Asthma, C. difficile colitis, Depression, Diabetes mellitus, type 2, Diabetic neuropathy, DVT (deep venous thrombosis), Enteritis due to Norovirus (02/22/2019), Feeding difficulty in adult (11/22/2021), GERD (gastroesophageal reflux disease), Hyperlipidemia, Hypertension, Internal hemorrhoids, Pancreatitis (09/23/2018), Pulmonary embolism, Respiratory failure with hypoxia (10/06/2021), Rheumatoid arthritis(714.0), Right upper quadrant pain (08/22/2021), and SOB (shortness of breath) (02/24/2023).     Surgical History: Mirna Quinteros  has a past surgical history that includes Cholecystectomy; Tubal ligation; Knee surgery; Knee arthroscopy; Carpal tunnel release (Left); Colonoscopy; Upper gastrointestinal endoscopy; and Colonoscopy (N/A, 4/9/2024).    Medications: Mirna has a current medication list which includes the following prescription(s): aspirin, bd ultra-fine mini pen needle, dronabinol, ezetimibe, freestyle tavares 2 sensor, furosemide, hydroxyzine hcl, insulin aspart u-100, insulin degludec, lansoprazole, linzess, mirtazapine, montelukast, morphine, mounjaro, ondansetron, oxycodone-acetaminophen, rosuvastatin, sertraline, sumatriptan, symbicort, tizanidine, valsartan-hydrochlorothiazide, warfarin, and zejula.    Allergies:   Review of patient's allergies indicates:   Allergen Reactions    Liraglutide Other (See Comments)     pancreatitis    Empagliflozin      Other reaction(s): yeast infxn    Glyburide       Other reaction(s): unknown    Metformin      Other reaction(s): GI upset    Pioglitazone      Other reaction(s): Swelling    Iohexol Itching     Patient given 50mg benadryl im and itching subsided        Prior Therapy/Previous treatment included: none   Prior Level of Function: independent   Current Level of Function: see above       Pts goals: resovle pain     OBJECTIVE     Informed verbal consent provided 5/14/2024 prior to exam.  Chaperone: daughter present       ABDOMINAL WALL ASSESSMENT  Palpation: tender and boggy  Abdominal coordination: able to produce eccentric transverse abdominis and maintain with exhale   Scarring: midline surgical scar noted with tissue restriction   Pelvic Floor Muscle and Transverse Abdominus Synergy: diminished     BREATHING MECHANICS ASSESSMENT   Thorax Assessment During Quiet Respiration: WNL excursion of ribcage and WNL excursion of abdominal wall  Thorax Assessment During Deep Respiration: Decreased excursion bilaterally of lateral ribs  and Decreased excursion of abdominal wall       Limitation/Restriction for FOTO Pelvic Survey    Therapist reviewed FOTO scores for Mirna Quinteros on 5/14/2024.   FOTO documents entered into [employer] - see Media section.       TREATMENT     Total Treatment time (time-based codes) separate from Evaluation: 30 minutes      Therapeutic Activity Patient participated in dynamic functional therapeutic activities to improve functional performance for 30 minutes. Including: Education as described below:     Patient Education provided:   general anatomy/physiology of urinary/ bowel  system and benefits of treatment were discussed with the pt. Additionally, anatomy/physiology of pelvic floor, diaphragmatic breathing, and proper bearing down techniques were reviewed.     Home Exercises provided:  Written Home Exercises provided: yes.  Exercises were reviewed and Mirna was able to demonstrate them prior to the end of the session.    Mirna demonstrated good   understanding of the education provided.     See EMR under Patient Instructions for exercises provided 5/14/2024.    Assessment     Mirna is a 64 y.o. female referred to outpatient Physical Therapy with a medical diagnosis of Abdominal pain [R10.9] . Pt presents with poor knowledge of body mechanics and posture, adhered abdominal scar, poor trunk stability, decreased pelvic muscle strength, decreased endurance of the pelvic muscles, decreased phasic ability of the pelvic muscles, increased tension of the pelvic muscles, poor quality of pelvic muscle contraction, increased frequency of urination, poor coordination of pelvic floor muscles during ADL's leading to urinary or fecal leakage, dysfunctional voiding, dysfunctional defecation, and unable to co-contract or co-relax abdominal wall and pelvic floor muscles.      Pt prognosis is Good.   Pt will benefit from skilled outpatient Physical Therapy to address the deficits stated above and in the chart below, provide pt/family education, and to maximize pt's level of independence.     Plan of care discussed with patient: Yes  Pt's spiritual, cultural and educational needs considered and patient is agreeable to the plan of care and goals as stated below:     Anticipated Barriers for therapy: co-morbidities     Medical Necessity is demonstrated by the following  History  Co-morbidities and personal factors that may impact the plan of care [] LOW: no personal factors / co-morbidities  [x] MODERATE: 1-2 personal factors / co-morbidities  [] HIGH: 3+ personal factors / co-morbidities    Moderate / High Support Documentation:   Co-morbidities affecting plan of care:  has a past medical history of Arthritis, Asthma, C. difficile colitis, Depression, Diabetes mellitus, type 2, Diabetic neuropathy, DVT (deep venous thrombosis), Enteritis due to Norovirus (02/22/2019), Feeding difficulty in adult (11/22/2021), GERD (gastroesophageal reflux disease), Hyperlipidemia, Hypertension,  Internal hemorrhoids, Pancreatitis (09/23/2018), Pulmonary embolism, Respiratory failure with hypoxia (10/06/2021), Rheumatoid arthritis(714.0), Right upper quadrant pain (08/22/2021), and SOB (shortness of breath) (02/24/2023).     Personal Factors:   None      Examination  Body Structures and Functions, activity limitations and participation restrictions that may impact the plan of care [] LOW: addressing 1-2 elements  [x] MODERATE: 3+ elements  [] HIGH: 4+ elements (please support below)    Moderate / High Support Documentation: see evaluation     Clinical Presentation [] LOW: stable  [x] MODERATE: Evolving  [] HIGH: Unstable     Decision Making/ Complexity Score: moderate         Goals:  Short Term Goals: 6 weeks   - Pt will demonstrate excellent knowledge and adherence to HEP to facilitate optimal recovery.  - Pt will demonstrate proper PFM contraction, relaxation, and lengthening coordinated with TA and breath for improved muscle coordination needed for functional activity.    Long Term Goals: 12 weeks   - Pt will demonstrate excellent knowledge and adherence to HEP for continued self-maintenance of symptoms.  - Pt will report FOTO score of 10% improvement or more indicating clinically relevant increase in function.  - Pt will report ability to delay urinary urge for at least 10 minutes to maintain continence with ADL/IADLs.   - Pt will report little (drops) to no incidence of urinary incontinence 7/7 days for improved hygiene and ADL/IADL tolerance.   - Pt will demonstrate PFM strength of at least 3/5 MMT for improved strength needed to maintain continence.   - Pt will report bearing down appropriately 100% of the time for improved bowel function and decreased stress on adjacent pelvic structures.   - Pt will demonstrate independence with pressure-management strategies to decreased stress on adjacent pelvic structures.       Plan     Plan of care Certification: 5/14/2024 to 8/14/2024.    Outpatient Physical  Therapy 1 times weekly for 12 weeks to include the following interventions: therapeutic exercises, therapeutic activity, neuromuscular re-education, manual therapy, modalities PRN, patient/family education, dry needling, and self care/home management    Dolly Izquierdo, PT, DPT, WCS

## 2024-05-20 ENCOUNTER — CLINICAL SUPPORT (OUTPATIENT)
Dept: REHABILITATION | Facility: HOSPITAL | Age: 64
End: 2024-05-20
Payer: MEDICARE

## 2024-05-20 ENCOUNTER — LAB VISIT (OUTPATIENT)
Dept: LAB | Facility: HOSPITAL | Age: 64
End: 2024-05-20
Attending: ORTHOPAEDIC SURGERY
Payer: MEDICARE

## 2024-05-20 ENCOUNTER — ANTI-COAG VISIT (OUTPATIENT)
Dept: CARDIOLOGY | Facility: CLINIC | Age: 64
End: 2024-05-20
Payer: MEDICARE

## 2024-05-20 DIAGNOSIS — Z79.01 WARFARIN ANTICOAGULATION: Primary | ICD-10-CM

## 2024-05-20 DIAGNOSIS — Z79.01 LONG TERM (CURRENT) USE OF ANTICOAGULANTS: ICD-10-CM

## 2024-05-20 DIAGNOSIS — I82.5Z2 CHRONIC DEEP VEIN THROMBOSIS (DVT) OF DISTAL VEIN OF LEFT LOWER EXTREMITY: ICD-10-CM

## 2024-05-20 DIAGNOSIS — Z79.01 WARFARIN ANTICOAGULATION: ICD-10-CM

## 2024-05-20 DIAGNOSIS — M62.89 PELVIC FLOOR DYSFUNCTION: Primary | ICD-10-CM

## 2024-05-20 DIAGNOSIS — I82.90 VTE (VENOUS THROMBOEMBOLISM): ICD-10-CM

## 2024-05-20 LAB
INR PPP: 1.6 (ref 0.8–1.2)
PROTHROMBIN TIME: 17.2 SEC (ref 9–12.5)

## 2024-05-20 PROCEDURE — 97140 MANUAL THERAPY 1/> REGIONS: CPT | Mod: PO

## 2024-05-20 PROCEDURE — 85610 PROTHROMBIN TIME: CPT | Performed by: INTERNAL MEDICINE

## 2024-05-20 PROCEDURE — 93793 ANTICOAG MGMT PT WARFARIN: CPT | Mod: S$GLB,,,

## 2024-05-20 PROCEDURE — 97112 NEUROMUSCULAR REEDUCATION: CPT | Mod: PO

## 2024-05-20 PROCEDURE — 36415 COLL VENOUS BLD VENIPUNCTURE: CPT | Performed by: INTERNAL MEDICINE

## 2024-05-20 NOTE — PROGRESS NOTES
Pelvic Health Physical Therapy   Treatment Note     Name: Mirna Quinteros  Clinic Number: [id number]    Therapy Diagnosis:   Encounter Diagnosis   Name Primary?    Pelvic floor dysfunction Yes     Physician: Abbie Still MD    Visit Date: 5/20/2024    Physician Orders: PT Eval and Treat   Medical Diagnosis from Referral:  Abdominal pain [R10.9]   Evaluation Date: 5/14/2024  Authorization Period Expiration: 12/31/2024  Plan of Care Expiration: 8/14/2024  Visit # / Visits authorized: 1/ 1     FOTO 2 /3 on 5/20/2024       Time In: 11:30  Time Out: 12:15  Total Appointment Time (timed & untimed codes): 45 minutes     Precautions:   12/13/2021 - RADICAL TUMOR DEBULKING INCLUDING TOTAL ABDOMINAL HYSTERECTOMY, BILATERAL SALPINGO-OOPHORECTOMY, OMENTECOMY       Subjective     Pt reports: has used bathroom the last two days bearing down with eccentric transverse abdominis. Still hasn't gotten a stool and her tub is too high to put her feet on.   Hasn't had much of an appetite   She was compliant with home exercise program.  Response to previous treatment: first f/u   Functional change: first f/u     Pain: 0/10  Location: abdomen     Objective     Mirna received the following manual therapy techniques: to develop desensitization for 15 minutes including: soft tissue mobilization of abdominal wall - instruction on colon massage       Mirna participated in neuromuscular re-education activities to develop Down training for 30 minutes including: diaphragmatic breathing with single knee to chest stretch and seated piriformis stretches       Home Exercises Provided and Patient Education Provided     Education provided:   - anatomy/physiology of pelvic floor, diaphragmatic breathing, and colon massage   Discussed progression of plan of care with patient; educated pt in activity modification; reviewed HEP with pt. Pt demonstrated and verbalized understanding of all instruction and was provided with a handout of HEP (see Patient  Instructions).      Written Home Exercises Provided: yes.  Exercises were reviewed and Mirna was able to demonstrate them prior to the end of the session.  Mirna demonstrated good  understanding of the education provided.     See EMR under Patient Instructions for exercises provided 5/20/2024.    Assessment     Instructed patient on colon massage for home performance to aid in bowel function. Added stretches for improved hip and pelvic mobility and decreased pain.   Mirna Is progressing well towards her goals.   Pt prognosis is Good.     Pt will continue to benefit from skilled outpatient physical therapy to address the deficits listed in the problem list box on initial evaluation, provide pt/family education and to maximize pt's level of independence in the home and community environment.     Pt's spiritual, cultural and educational needs considered and pt agreeable to plan of care and goals.     Anticipated barriers to physical therapy: none     Goals:  Short Term Goals: 6 weeks   - Pt will demonstrate excellent knowledge and adherence to HEP to facilitate optimal recovery.  - Pt will demonstrate proper PFM contraction, relaxation, and lengthening coordinated with TA and breath for improved muscle coordination needed for functional activity.     Long Term Goals: 12 weeks   - Pt will demonstrate excellent knowledge and adherence to HEP for continued self-maintenance of symptoms.  - Pt will report FOTO score of 10% improvement or more indicating clinically relevant increase in function.  - Pt will report ability to delay urinary urge for at least 10 minutes to maintain continence with ADL/IADLs.   - Pt will report little (drops) to no incidence of urinary incontinence 7/7 days for improved hygiene and ADL/IADL tolerance.   - Pt will demonstrate PFM strength of at least 3/5 MMT for improved strength needed to maintain continence.   - Pt will report bearing down appropriately 100% of the time for improved bowel  function and decreased stress on adjacent pelvic structures.   - Pt will demonstrate independence with pressure-management strategies to decreased stress on adjacent pelvic structures.         Plan    cont per plan of care   Plan of care Certification: 5/14/2024 to 8/14/2024.    Dolly Izquierdo, PT

## 2024-05-20 NOTE — PATIENT INSTRUCTIONS
Colon Massage:   start at your right hip, massage up to your right ribcage, across to your left ribcage, and down to your left hip, using circular motions. Repeat 3 times.     Attempt to have a bowel movement after performing massage.   Perform as needed after each meal or snack      _____________________________________        Single knee to chest stretch  - do your belly breathing with that to help relax your muscles   - do 3 sets on each leg, one minute each.

## 2024-05-20 NOTE — PROGRESS NOTES
[facility]    2024 11:48 AM    Assessment/Plan:    Patient presents today with subtherapeutic  INR.    Assessment of patient findings and chart review: pt reports reduced appetite and eating less greens, INR remains subtherapeutic despite this    Recommendation for patient's warfarin regimen: Increase maintenance dose    Recommend repeat INR in 1 week  _________________________________________________________________    Mirna ROE Quinteros (64 y.o.) is followed by the [facility].    Anticoagulation Summary  As of 2024      INR goal:  2.0-2.5   TTR:  17.6% (5.2 mo)   INR used for dosin.6 (2024)   Warfarin maintenance plan:  3.75 mg (7.5 mg x 0.5) every Wed; 7.5 mg (7.5 mg x 1) all other days   Weekly warfarin total:  48.75 mg   Plan last modified:  Evon Sarmiento, PharmD (2024)   Next INR check:  2024   Target end date:      Indications    Warfarin anticoagulation [Z79.01]  Long term (current) use of anticoagulants [Z79.01]  Chronic deep vein thrombosis (DVT) of distal vein of left lower extremity [I82.5Z2]                 Anticoagulation Episode Summary       INR check location:  Anticoagulation Clinic    Preferred lab:      Send INR reminders to:  [facility] COUMADIN MONITORING POOL    Comments:  [facility] [place] Phone   [phone] Fax   [phone]          Anticoagulation Care Providers       Provider Role Specialty Phone number    Emil Burgess MD [facility] Medical Oncology [phone]            Patient Findings       Positives:  Change in diet/appetite    Negatives:  Signs/symptoms of bleeding, Change in health, Change in alcohol use, Change in activity, Upcoming invasive procedure, Emergency department visit, Upcoming dental procedure, Missed doses, Extra doses, Change in medications, Hospital admission, Bruising, Other complaints    Comments:  Pt verified dose: 7.5mg daily except  11.25 mg Sundays, reports less greens, less appetite  Addendum: pt confirms that she does not take Coumadin on Wednesdays per calendar

## 2024-05-24 DIAGNOSIS — K21.9 GASTROESOPHAGEAL REFLUX DISEASE, UNSPECIFIED WHETHER ESOPHAGITIS PRESENT: ICD-10-CM

## 2024-05-24 RX ORDER — LANSOPRAZOLE 30 MG/1
30 CAPSULE, DELAYED RELEASE ORAL
Qty: 90 CAPSULE | Refills: 3 | Status: SHIPPED | OUTPATIENT
Start: 2024-05-24

## 2024-05-27 ENCOUNTER — CLINICAL SUPPORT (OUTPATIENT)
Dept: HEMATOLOGY/ONCOLOGY | Facility: CLINIC | Age: 64
End: 2024-05-27
Attending: INTERNAL MEDICINE
Payer: MEDICARE

## 2024-05-27 VITALS — HEIGHT: 67 IN | BODY MASS INDEX: 37.67 KG/M2 | WEIGHT: 240 LBS

## 2024-05-27 DIAGNOSIS — R63.0 POOR APPETITE: ICD-10-CM

## 2024-05-27 DIAGNOSIS — Z71.3 NUTRITIONAL COUNSELING: Primary | ICD-10-CM

## 2024-05-27 DIAGNOSIS — C57.00 FALLOPIAN TUBE CANCER, CARCINOMA, UNSPECIFIED LATERALITY: ICD-10-CM

## 2024-05-27 DIAGNOSIS — E11.49 DIABETES MELLITUS TYPE 2 WITH NEUROLOGICAL MANIFESTATIONS: ICD-10-CM

## 2024-05-27 DIAGNOSIS — I82.90 VTE (VENOUS THROMBOEMBOLISM): ICD-10-CM

## 2024-05-27 PROCEDURE — 97802 MEDICAL NUTRITION INDIV IN: CPT | Mod: 95,,, | Performed by: DIETITIAN, REGISTERED

## 2024-05-29 ENCOUNTER — LAB VISIT (OUTPATIENT)
Dept: LAB | Facility: HOSPITAL | Age: 64
End: 2024-05-29
Attending: INTERNAL MEDICINE
Payer: MEDICARE

## 2024-05-29 ENCOUNTER — PATIENT MESSAGE (OUTPATIENT)
Dept: HEMATOLOGY/ONCOLOGY | Facility: CLINIC | Age: 64
End: 2024-05-29
Payer: MEDICARE

## 2024-05-29 ENCOUNTER — ANTI-COAG VISIT (OUTPATIENT)
Dept: CARDIOLOGY | Facility: CLINIC | Age: 64
End: 2024-05-29
Payer: MEDICARE

## 2024-05-29 DIAGNOSIS — Z79.01 LONG TERM (CURRENT) USE OF ANTICOAGULANTS: ICD-10-CM

## 2024-05-29 DIAGNOSIS — E11.49 DIABETES MELLITUS TYPE 2 WITH NEUROLOGICAL MANIFESTATIONS: Chronic | ICD-10-CM

## 2024-05-29 DIAGNOSIS — Z79.01 WARFARIN ANTICOAGULATION: ICD-10-CM

## 2024-05-29 DIAGNOSIS — I82.5Z2 CHRONIC DEEP VEIN THROMBOSIS (DVT) OF DISTAL VEIN OF LEFT LOWER EXTREMITY: ICD-10-CM

## 2024-05-29 DIAGNOSIS — Z79.01 WARFARIN ANTICOAGULATION: Primary | ICD-10-CM

## 2024-05-29 DIAGNOSIS — I82.90 VTE (VENOUS THROMBOEMBOLISM): ICD-10-CM

## 2024-05-29 LAB
INR PPP: 2.1 (ref 0.8–1.2)
PROTHROMBIN TIME: 22.4 SEC (ref 9–12.5)

## 2024-05-29 PROCEDURE — 93793 ANTICOAG MGMT PT WARFARIN: CPT | Mod: S$GLB,,,

## 2024-05-29 PROCEDURE — 36415 COLL VENOUS BLD VENIPUNCTURE: CPT | Performed by: INTERNAL MEDICINE

## 2024-05-29 PROCEDURE — 85610 PROTHROMBIN TIME: CPT | Performed by: INTERNAL MEDICINE

## 2024-05-29 RX ORDER — TIRZEPATIDE 5 MG/.5ML
5 INJECTION, SOLUTION SUBCUTANEOUS
Qty: 4 PEN | Refills: 3 | Status: SHIPPED | OUTPATIENT
Start: 2024-05-29

## 2024-05-29 NOTE — PROGRESS NOTES
[facility]    2024 3:14 PM    Assessment/Plan:    Patient presents today with therapeutic INR.    Recommendation for patient's warfarin regimen: Continue current maintenance dose    Recommend repeat INR in 2 weeks  _________________________________________________________________    Mirna Quinteros (64 y.o.) is followed by the [facility].    Anticoagulation Summary  As of 2024      INR goal:  2.0-2.5   TTR:  17.7% (5.5 mo)   INR used for dosin.1 (2024)   Warfarin maintenance plan:  3.75 mg (7.5 mg x 0.5) every Sun, Wed; 7.5 mg (7.5 mg x 1) all other days   Weekly warfarin total:  45 mg   No change documented:  Evon Sarmiento, PharmD   Plan last modified:  Evon Sarmiento, PharmD (2024)   Next INR check:  2024   Target end date:      Indications    Warfarin anticoagulation [Z79.01]  Long term (current) use of anticoagulants [Z79.01]  Chronic deep vein thrombosis (DVT) of distal vein of left lower extremity [I82.5Z2]                 Anticoagulation Episode Summary       INR check location:  Anticoagulation Clinic    Preferred lab:      Send INR reminders to:  [facility] COUMADIN MONITORING POOL    Comments:  [facility] [place] Phone   [phone] Fax   [phone]          Anticoagulation Care Providers       Provider Role Specialty Phone number    Emil Burgess MD [facility] Medical Oncology [phone]

## 2024-05-31 ENCOUNTER — PATIENT MESSAGE (OUTPATIENT)
Dept: HEMATOLOGY/ONCOLOGY | Facility: CLINIC | Age: 64
End: 2024-05-31
Payer: MEDICARE

## 2024-05-31 ENCOUNTER — DOCUMENTATION ONLY (OUTPATIENT)
Dept: HEMATOLOGY/ONCOLOGY | Facility: CLINIC | Age: 64
End: 2024-05-31
Payer: MEDICARE

## 2024-05-31 NOTE — PROGRESS NOTES
Received referral from the clinic that patient in need of assistance with boost. Called patient to assist. Completed a [employer]ott for boost, [employer] card and gas cards.

## 2024-06-02 ENCOUNTER — PATIENT MESSAGE (OUTPATIENT)
Dept: FAMILY MEDICINE | Facility: CLINIC | Age: 64
End: 2024-06-02
Payer: MEDICARE

## 2024-06-02 ENCOUNTER — PATIENT MESSAGE (OUTPATIENT)
Dept: GYNECOLOGIC ONCOLOGY | Facility: CLINIC | Age: 64
End: 2024-06-02
Payer: MEDICARE

## 2024-06-04 ENCOUNTER — DOCUMENTATION ONLY (OUTPATIENT)
Dept: HEMATOLOGY/ONCOLOGY | Facility: CLINIC | Age: 64
End: 2024-06-04

## 2024-06-04 ENCOUNTER — DOCUMENTATION ONLY (OUTPATIENT)
Dept: REHABILITATION | Facility: HOSPITAL | Age: 64
End: 2024-06-04
Payer: MEDICARE

## 2024-06-04 ENCOUNTER — CLINICAL SUPPORT (OUTPATIENT)
Dept: REHABILITATION | Facility: HOSPITAL | Age: 64
End: 2024-06-04
Payer: MEDICARE

## 2024-06-04 ENCOUNTER — DOCUMENTATION ONLY (OUTPATIENT)
Dept: HEMATOLOGY/ONCOLOGY | Facility: CLINIC | Age: 64
End: 2024-06-04
Payer: MEDICARE

## 2024-06-04 DIAGNOSIS — M62.89 PELVIC FLOOR DYSFUNCTION: Primary | ICD-10-CM

## 2024-06-04 PROCEDURE — 97140 MANUAL THERAPY 1/> REGIONS: CPT | Mod: PO,CQ

## 2024-06-04 NOTE — PROGRESS NOTES
Pelvic Health Physical Therapy   Treatment Note     Name: Mirna Quinteros  [facility] Number: [id number]    Therapy Diagnosis:   Encounter Diagnosis   Name Primary?    Pelvic floor dysfunction Yes     Physician: Abbie Still MD    Visit Date: 6/4/2024    Physician Orders: PT Eval and Treat   Medical Diagnosis from Referral:  Abdominal pain [R10.9]   Evaluation Date: 5/14/2024  Authorization Period Expiration: 12/31/2024  Plan of Care Expiration: 8/14/2024  Visit # / Visits authorized: 2/ 20     FOTO 2 /3 on 5/20/2024       Time In: 8:10  Time Out: 9:05  Total Appointment Time (timed & untimed codes): 55 minutes     Precautions:   12/13/2021 - RADICAL TUMOR DEBULKING INCLUDING TOTAL ABDOMINAL HYSTERECTOMY, BILATERAL SALPINGO-OOPHORECTOMY, OMENTECOMY       Subjective     Pt reports: she is now having bowel movements every other day. She has been compliant with colon massage which has been very effective. She is still having pain in right lower abdominal quadrant.      She was compliant with home exercise program.  Response to previous treatment: good   Functional change: improved regularity of bowel movements     Pain: 0/10 at rest; can spike up to 5-6/10   Location: abdomen     Objective     Mirna received the following manual therapy techniques: to develop desensitization for 40 minutes including: soft tissue mobilization of abdominal wall -      Myofacial and scar mobilization   Kinesiotaping of largest abdominal scar with basket weave/5% tension avoiding area where feeding tube was placed due to hypersensitivity.     Mirna participated in neuromuscular re-education activities to develop Down training for 0 minutes including: diaphragmatic breathing with single knee to chest stretch and seated piriformis stretches       Home Exercises Provided and Patient Education Provided     Education provided:   - anatomy/physiology of pelvic floor, diaphragmatic breathing, and colon massage   Discussed progression of plan of  care with patient; educated pt in activity modification; reviewed HEP with pt. Pt demonstrated and verbalized understanding of all instruction and was provided with a handout of HEP (see Patient Instructions).      Written Home Exercises Provided: yes.  Exercises were reviewed and Mirna was able to demonstrate them prior to the end of the session.  Mirna demonstrated good  understanding of the education provided.     See EMR under Patient Instructions for exercises provided 5/20/2024.    Assessment     Today's treatment focused on myofacial and scar mobilization techniques. Patient had areas of hypersensitivity at most superior region of scar where patient reported feeding tube was placed. She was instructed to self mobilize this area to decrease sensitivity. Improved mobilization was achieved in lower right quadrant with myofacial release techniques however restrictions are extensive and will require follow up manual therapy. Patient is expected to respond well to therapy with decreased pain and restriction in abdominal cavity.     Mirna Is progressing well towards her goals.   Pt prognosis is Good.     Pt will continue to benefit from skilled outpatient physical therapy to address the deficits listed in the problem list box on initial evaluation, provide pt/family education and to maximize pt's level of independence in the home and community environment.     Pt's spiritual, cultural and educational needs considered and pt agreeable to plan of care and goals.     Anticipated barriers to physical therapy: none     Goals:  Short Term Goals: 6 weeks   - Pt will demonstrate excellent knowledge and adherence to HEP to facilitate optimal recovery.  - Pt will demonstrate proper PFM contraction, relaxation, and lengthening coordinated with TA and breath for improved muscle coordination needed for functional activity.     Long Term Goals: 12 weeks   - Pt will demonstrate excellent knowledge and adherence to HEP for  continued self-maintenance of symptoms.  - Pt will report FOTO score of 10% improvement or more indicating clinically relevant increase in function.  - Pt will report ability to delay urinary urge for at least 10 minutes to maintain continence with ADL/IADLs.   - Pt will report little (drops) to no incidence of urinary incontinence 7/7 days for improved hygiene and ADL/IADL tolerance.   - Pt will demonstrate PFM strength of at least 3/5 MMT for improved strength needed to maintain continence.   - Pt will report bearing down appropriately 100% of the time for improved bowel function and decreased stress on adjacent pelvic structures.   - Pt will demonstrate independence with pressure-management strategies to decreased stress on adjacent pelvic structures.         Plan    cont per plan of care   Plan of care Certification: 5/14/2024 to 8/14/2024.    Mari Askew, PEDRO

## 2024-06-04 NOTE — PROGRESS NOTES
Provided patient's daughter with Boost Glucose Control (Chocolate) today and confirmed that she visited the food pantry today.     Jrerod BANEGAS, , LDN  Advanced Practice in Clinical Nutrition  Board Certified Specialist in Oncology Nutrition   [facility]  [phone]

## 2024-06-11 ENCOUNTER — ANTI-COAG VISIT (OUTPATIENT)
Dept: CARDIOLOGY | Facility: CLINIC | Age: 64
End: 2024-06-11
Payer: MEDICARE

## 2024-06-11 ENCOUNTER — CLINICAL SUPPORT (OUTPATIENT)
Dept: REHABILITATION | Facility: HOSPITAL | Age: 64
End: 2024-06-11
Payer: MEDICARE

## 2024-06-11 ENCOUNTER — LAB VISIT (OUTPATIENT)
Dept: LAB | Facility: HOSPITAL | Age: 64
End: 2024-06-11
Attending: INTERNAL MEDICINE
Payer: MEDICARE

## 2024-06-11 DIAGNOSIS — Z79.01 WARFARIN ANTICOAGULATION: ICD-10-CM

## 2024-06-11 DIAGNOSIS — Z79.01 LONG TERM (CURRENT) USE OF ANTICOAGULANTS: ICD-10-CM

## 2024-06-11 DIAGNOSIS — Z79.01 WARFARIN ANTICOAGULATION: Primary | ICD-10-CM

## 2024-06-11 DIAGNOSIS — I82.5Z2 CHRONIC DEEP VEIN THROMBOSIS (DVT) OF DISTAL VEIN OF LEFT LOWER EXTREMITY: ICD-10-CM

## 2024-06-11 DIAGNOSIS — M62.89 PELVIC FLOOR DYSFUNCTION: Primary | ICD-10-CM

## 2024-06-11 DIAGNOSIS — I82.90 VTE (VENOUS THROMBOEMBOLISM): ICD-10-CM

## 2024-06-11 LAB
INR PPP: 1.3 (ref 0.8–1.2)
PROTHROMBIN TIME: 13.8 SEC (ref 9–12.5)

## 2024-06-11 PROCEDURE — 93793 ANTICOAG MGMT PT WARFARIN: CPT | Mod: S$GLB,,,

## 2024-06-11 PROCEDURE — 85610 PROTHROMBIN TIME: CPT | Performed by: INTERNAL MEDICINE

## 2024-06-11 PROCEDURE — 97140 MANUAL THERAPY 1/> REGIONS: CPT | Mod: PO,CQ

## 2024-06-11 PROCEDURE — 36415 COLL VENOUS BLD VENIPUNCTURE: CPT | Performed by: INTERNAL MEDICINE

## 2024-06-11 NOTE — PROGRESS NOTES
[facility]    2024 2:23 PM    Assessment/Plan:    Patient presents today with subtherapeutic  INR.    Assessment of patient findings and chart review: INR quite low and only change reported is Ensure x1 last week    Recommendation for patient's warfarin regimen:  change maintenance dose/boost    Recommend repeat INR in 1 week  _________________________________________________________________    Mirna Quinteros (64 y.o.) is followed by the [facility].    Anticoagulation Summary  As of 2024      INR goal:  2.0-2.5   TTR:  17.4% (6 mo)   INR used for dosin.3 (2024)   Warfarin maintenance plan:  3.75 mg (7.5 mg x 0.5) every Mon, Thu; 7.5 mg (7.5 mg x 1) all other days   Weekly warfarin total:  45 mg   Plan last modified:  Enriqueta Keene, PharmD (2024)   Next INR check:     Target end date:      Indications    Warfarin anticoagulation [Z79.01]  Long term (current) use of anticoagulants [Z79.01]  Chronic deep vein thrombosis (DVT) of distal vein of left lower extremity [I82.5Z2]                 Anticoagulation Episode Summary       INR check location:  Anticoagulation Clinic    Preferred lab:      Send INR reminders to:  [facility] COUMADIN MONITORING POOL    Comments:  [facility] [place] Phone   [phone] Fax   [phone]          Anticoagulation Care Providers       Provider Role Specialty Phone number    Emil Burgess MD [facility] Medical Oncology [phone]

## 2024-06-11 NOTE — PROGRESS NOTES
Pelvic Health Physical Therapy   Treatment Note     Name: Mirna Quinteros  Clinic Number: [id number]    Therapy Diagnosis:   Encounter Diagnosis   Name Primary?    Pelvic floor dysfunction Yes     Physician: Abbie Still MD    Visit Date: 6/11/2024    Physician Orders: PT Eval and Treat   Medical Diagnosis from Referral:  Abdominal pain [R10.9]   Evaluation Date: 5/14/2024  Authorization Period Expiration: 12/31/2024  Plan of Care Expiration: 8/14/2024  Visit # / Visits authorized: 3/ 20     FOTO 2 /3 on 5/20/2024       Time In: 1:45  Time Out: 2:30  Total Appointment Time (timed & untimed codes): 45 minutes     Precautions:   12/13/2021 - RADICAL TUMOR DEBULKING INCLUDING TOTAL ABDOMINAL HYSTERECTOMY, BILATERAL SALPINGO-OOPHORECTOMY, OMENTECOMY       Subjective     Pt reports: palpable improvement of scar quality since previous visit.       She was compliant with home exercise program.  Response to previous treatment: good   Functional change: improved regularity of bowel movements     Pain: 0/10 at rest; can spike up to 5-6/10   Location: abdomen     Objective     Mirna received the following manual therapy techniques: to develop desensitization for 40 minutes including: soft tissue mobilization of abdominal wall -      Myofacial and scar mobilization   Kinesiotaping of largest abdominal scar with basket weave/5% tension     Mirna participated in neuromuscular re-education activities to develop Down training for 0 minutes including: diaphragmatic breathing with single knee to chest stretch and seated piriformis stretches       Home Exercises Provided and Patient Education Provided     Education provided:   - anatomy/physiology of pelvic floor, diaphragmatic breathing, and colon massage   Discussed progression of plan of care with patient; educated pt in activity modification; reviewed HEP with pt. Pt demonstrated and verbalized understanding of all instruction and was provided with a handout of HEP (see  Patient Instructions).      Written Home Exercises Provided: yes.  Exercises were reviewed and Mirna was able to demonstrate them prior to the end of the session.  Mirna demonstrated good  understanding of the education provided.     See EMR under Patient Instructions for exercises provided 5/20/2024.    Assessment     Today's treatment focused on myofacial and scar mobilization techniques. Decreased sensitivity noted at superficial scar region where feeding tube was placed. She was able to tolerate manual therapy in this area today. Myofacial restrictions remain extensive and will require follow up manual therapy but do seem to be improving. Kinesiotape reapplied following manual therapy today.      Mirna Is progressing well towards her goals.   Pt prognosis is Good.     Pt will continue to benefit from skilled outpatient physical therapy to address the deficits listed in the problem list box on initial evaluation, provide pt/family education and to maximize pt's level of independence in the home and community environment.     Pt's spiritual, cultural and educational needs considered and pt agreeable to plan of care and goals.     Anticipated barriers to physical therapy: none     Goals:  Short Term Goals: 6 weeks   - Pt will demonstrate excellent knowledge and adherence to HEP to facilitate optimal recovery.  - Pt will demonstrate proper PFM contraction, relaxation, and lengthening coordinated with TA and breath for improved muscle coordination needed for functional activity.     Long Term Goals: 12 weeks   - Pt will demonstrate excellent knowledge and adherence to HEP for continued self-maintenance of symptoms.  - Pt will report FOTO score of 10% improvement or more indicating clinically relevant increase in function.  - Pt will report ability to delay urinary urge for at least 10 minutes to maintain continence with ADL/IADLs.   - Pt will report little (drops) to no incidence of urinary incontinence 7/7 days for  improved hygiene and ADL/IADL tolerance.   - Pt will demonstrate PFM strength of at least 3/5 MMT for improved strength needed to maintain continence.   - Pt will report bearing down appropriately 100% of the time for improved bowel function and decreased stress on adjacent pelvic structures.   - Pt will demonstrate independence with pressure-management strategies to decreased stress on adjacent pelvic structures.         Plan    cont per plan of care   Plan of care Certification: 5/14/2024 to 8/14/2024.    Mari Askew, PTA

## 2024-06-13 ENCOUNTER — PATIENT MESSAGE (OUTPATIENT)
Dept: PALLIATIVE MEDICINE | Facility: CLINIC | Age: 64
End: 2024-06-13
Payer: MEDICARE

## 2024-06-13 ENCOUNTER — PATIENT MESSAGE (OUTPATIENT)
Dept: HEMATOLOGY/ONCOLOGY | Facility: CLINIC | Age: 64
End: 2024-06-13
Payer: MEDICARE

## 2024-06-13 RX ORDER — MORPHINE SULFATE 30 MG/1
30 TABLET, FILM COATED, EXTENDED RELEASE ORAL 2 TIMES DAILY
Qty: 60 TABLET | Refills: 0 | Status: SHIPPED | OUTPATIENT
Start: 2024-06-13

## 2024-06-18 ENCOUNTER — ANTI-COAG VISIT (OUTPATIENT)
Dept: CARDIOLOGY | Facility: CLINIC | Age: 64
End: 2024-06-18
Payer: MEDICARE

## 2024-06-18 ENCOUNTER — LAB VISIT (OUTPATIENT)
Dept: LAB | Facility: HOSPITAL | Age: 64
End: 2024-06-18
Attending: INTERNAL MEDICINE
Payer: MEDICARE

## 2024-06-18 ENCOUNTER — CLINICAL SUPPORT (OUTPATIENT)
Dept: REHABILITATION | Facility: HOSPITAL | Age: 64
End: 2024-06-18
Payer: MEDICARE

## 2024-06-18 DIAGNOSIS — M62.89 PELVIC FLOOR DYSFUNCTION: Primary | ICD-10-CM

## 2024-06-18 DIAGNOSIS — Z79.01 WARFARIN ANTICOAGULATION: Primary | ICD-10-CM

## 2024-06-18 DIAGNOSIS — Z79.01 LONG TERM (CURRENT) USE OF ANTICOAGULANTS: ICD-10-CM

## 2024-06-18 DIAGNOSIS — Z79.01 WARFARIN ANTICOAGULATION: ICD-10-CM

## 2024-06-18 DIAGNOSIS — I82.5Z2 CHRONIC DEEP VEIN THROMBOSIS (DVT) OF DISTAL VEIN OF LEFT LOWER EXTREMITY: ICD-10-CM

## 2024-06-18 DIAGNOSIS — I82.90 VTE (VENOUS THROMBOEMBOLISM): ICD-10-CM

## 2024-06-18 LAB
INR PPP: 1.3 (ref 0.8–1.2)
PROTHROMBIN TIME: 14.1 SEC (ref 9–12.5)

## 2024-06-18 PROCEDURE — 97140 MANUAL THERAPY 1/> REGIONS: CPT | Mod: PO

## 2024-06-18 PROCEDURE — 97530 THERAPEUTIC ACTIVITIES: CPT | Mod: PO

## 2024-06-18 PROCEDURE — 36415 COLL VENOUS BLD VENIPUNCTURE: CPT | Performed by: INTERNAL MEDICINE

## 2024-06-18 PROCEDURE — 93793 ANTICOAG MGMT PT WARFARIN: CPT | Mod: S$GLB,,,

## 2024-06-18 PROCEDURE — 85610 PROTHROMBIN TIME: CPT | Performed by: INTERNAL MEDICINE

## 2024-06-18 PROCEDURE — 97112 NEUROMUSCULAR REEDUCATION: CPT | Mod: PO

## 2024-06-18 NOTE — PATIENT INSTRUCTIONS
"Home Exercise Program: 06/18/2024    "Roll for Control"  Strategies to Delay Voiding    What to do when you feel like you "gotta go gotta go!"     Stop what you are doing.    Take a few good deep breaths (this settles down your nervous system and relaxes your bladder).    WHILE YOU CONTINUE DEEP BREATHING, activate your pelvic floor by performing several quick contractions and releases.  (Pelvic floor contractions send a message to the bladder to relax and hold urine.)  Sitting: Roll thigh in and out slowly or squeeze knees together  Standing: Roll thigh in/out slowly and gently    As you do the above, you can also count backwards from 100 (to help get your mind off the urge!).       After the urge to void has quietly, you may be able to process with your activity OR if it has been approximately 3 hours since you've voided, you may choose to go to the bathroom and void.        Remember......"Control your bladder before it controls YOU!"    "

## 2024-06-18 NOTE — PROGRESS NOTES
Pelvic Health Physical Therapy   Progress/Treatment Note     Name: Mirna Quinteros  Clinic Number: [id number]    Therapy Diagnosis:   Encounter Diagnosis   Name Primary?    Pelvic floor dysfunction Yes       Physician: Abbie Still MD    Visit Date: 6/18/2024    Physician Orders: PT Eval and Treat   Medical Diagnosis from Referral:  Abdominal pain [R10.9]   Evaluation Date: 5/14/2024  Authorization Period Expiration: 12/31/2024  Plan of Care Expiration: 8/14/2024  Visit # / Visits authorized: 4/ 20  FOTO 3/3 on 6/18/2024        Time In: 10:50 am   Time Out: 11:38 am  Total Appointment Time (timed & untimed codes): 48 minutes     Precautions:   12/13/2021 - RADICAL TUMOR DEBULKING INCLUDING TOTAL ABDOMINAL HYSTERECTOMY, BILATERAL SALPINGO-OOPHORECTOMY, OMENTECOMY       Subjective     Pt reports: things are going pretty good, the stool has been helping a lot as she is using the bathroom almost every day now. Less frequent pains than before.     She was compliant with home exercise program.  Response to previous treatment: good   Functional change: improved regularity of bowel movements     Pain: 0/10 at rest; can spike up to 5-6/10   Location: abdomen     Objective     Abdominal assessment: continued tenderness and scar tissue restrictions that improve with manual interventions. Right rectus abdominis lateral to umbilicus is especially tender to light palpation initially.        Mirna received the following manual therapy techniques: to develop desensitization for 25 minutes including: soft tissue mobilization of abdominal wall -      Myofacial and scar mobilization   Myofascial decompression of global abdominals R>L   Abdominal shearing  ILU massage     Not today:  Kinesiotaping of largest abdominal scar with basket weave/5% tension     Mirna participated in neuromuscular re-education activities to develop Down training for 10 minutes including: diaphragmatic breathing with single knee to chest stretch and  seated piriformis stretches     Diaphragmatic breathing  Diaphragmatic breathing + bearing down with abdominal release    Mirna participated in dynamic functional therapeutic activities to improve functional performance for 13  minutes, including:    [x] FOTO survey administered and reviewed, assessing progression of functional performance   [x] Plan of care review  [x] Anatomy review and discussion of the anatomy on current level of function   [] [facility] with functional activities review   [] Coordination of kegels with functional activities such as cough, laugh, sneeze, lift, etc.   [x] Pt edu in the Roll for Control technique to decrease incontinence with strong urge.  Practiced new technique in sitting and standing.    [x] Home exercise program issued and reviewed     Home Exercises Provided and Patient Education Provided     Education provided:   - anatomy/physiology of pelvic floor, diaphragmatic breathing, and colon massage   Discussed progression of plan of care with patient; educated pt in activity modification; reviewed HEP with pt. Pt demonstrated and verbalized understanding of all instruction and was provided with a handout of HEP (see Patient Instructions).      Written Home Exercises Provided: yes.  Exercises were reviewed and Mirna was able to demonstrate them prior to the end of the session.  Mirna demonstrated good  understanding of the education provided.     See EMR under Patient Instructions for exercises provided 5/20/2024.    Assessment     Mirna tolerated today's progress note well with gradual improvements in both urinary and bowel function. Reviewed bearing down and appropriate abdominal release with bearing down for more efficient and complete bowel movements.  Continued tenderness and scar tissue restrictions that improve with manual interventions. Right rectus abdominis lateral to umbilicus is especially tender to light palpation initially. Continued decrease in sensitivity noted  at superficial scar region where feeding tube was placed. Myofacial restrictions remain extensive and will require follow up manual therapy but do seem to be improving.     Mirna Is progressing well towards her goals.   Pt prognosis is Good.     Pt will continue to benefit from skilled outpatient physical therapy to address the deficits listed in the problem list box on initial evaluation, provide pt/family education and to maximize pt's level of independence in the home and community environment.     Pt's spiritual, cultural and educational needs considered and pt agreeable to plan of care and goals.     Anticipated barriers to physical therapy: none     Goals: (PROGRESSING 6/18/2024 )  Short Term Goals: 6 weeks   - Pt will demonstrate excellent knowledge and adherence to HEP to facilitate optimal recovery.( MET 6/18/2024)  - Pt will demonstrate proper PFM contraction, relaxation, and lengthening coordinated with TA and breath for improved muscle coordination needed for functional activity..( MET 6/18/2024) feels good about samuel her core muscles      Long Term Goals: 12 weeks   - Pt will demonstrate excellent knowledge and adherence to HEP for continued self-maintenance of symptoms..(NOT MET 6/18/2024)  - Pt will report FOTO score of 10% improvement or more indicating clinically relevant increase in function..(PART MET 6/18/2024)  - Pt will report ability to delay urinary urge for at least 10 minutes to maintain continence with ADL/IADLs. .(NOT MET 6/18/2024)   - Pt will report little (drops) to no incidence of urinary incontinence 7/7 days for improved hygiene and ADL/IADL tolerance. .(PART MET 6/18/2024) one time last week  - Pt will demonstrate PFM strength of at least 3/5 MMT for improved strength needed to maintain continence. .(NOT MET 6/18/2024)  - Pt will report bearing down appropriately 100% of the time for improved bowel function and decreased stress on adjacent pelvic structures. .(PART MET  6/18/2024)  - Pt will demonstrate independence with pressure-management strategies to decreased stress on adjacent pelvic structures. .(PART MET 6/18/2024)        Plan    cont per plan of care   Plan of care Certification: 5/14/2024 to 8/14/2024.    Next visit: perform pelvic coordination work, working especially on bearing down and proprioception training (squeeze vs drop). Continue cupping and return to abdominal taping.     Kalyn Brian, PT   6/18/2024

## 2024-06-19 ENCOUNTER — HOSPITAL ENCOUNTER (OUTPATIENT)
Dept: RADIOLOGY | Facility: HOSPITAL | Age: 64
Discharge: HOME | End: 2024-06-19
Payer: MEDICARE

## 2024-06-19 ENCOUNTER — TELEPHONE (OUTPATIENT)
Dept: FAMILY MEDICINE | Facility: CLINIC | Age: 64
End: 2024-06-19
Payer: MEDICARE

## 2024-06-19 DIAGNOSIS — R92.8 ABNORMAL MAMMOGRAM OF RIGHT BREAST: ICD-10-CM

## 2024-06-19 PROCEDURE — 77066 DX MAMMO INCL CAD BI: CPT | Mod: 26,,, | Performed by: RADIOLOGY

## 2024-06-19 PROCEDURE — 77062 BREAST TOMOSYNTHESIS BI: CPT | Mod: 26,,, | Performed by: RADIOLOGY

## 2024-06-19 PROCEDURE — 77062 BREAST TOMOSYNTHESIS BI: CPT | Mod: TC

## 2024-06-19 NOTE — PROGRESS NOTES
[facility]    2024 9:15 AM    Assessment/Plan:    Patient presents today with subtherapeutic  INR.    Assessment of patient findings and chart review: pt reports drinking 2 ensure and took lower weekly dose than advised    Recommendation for patient's warfarin regimen: Boost dose today to 11.25mg then resume current maintenance dose correctly, pt advised to write down updated weekly dose    Recommend repeat INR in 1 week  _________________________________________________________________    Mirna Quinteros (64 y.o.) is followed by the [facility].    Anticoagulation Summary  As of 2024      INR goal:  2.0-2.5   TTR:  16.7% (6.2 mo)   INR used for dosin.3 (2024)   Warfarin maintenance plan:  3.75 mg (7.5 mg x 0.5) every Sun, Fri; 7.5 mg (7.5 mg x 1) all other days   Weekly warfarin total:  45 mg   Plan last modified:  Evon Sarmiento, PharmD (2024)   Next INR check:  2024   Target end date:      Indications    Warfarin anticoagulation [Z79.01]  Long term (current) use of anticoagulants [Z79.01]  Chronic deep vein thrombosis (DVT) of distal vein of left lower extremity [I82.5Z2]                 Anticoagulation Episode Summary       INR check location:  Anticoagulation Clinic    Preferred lab:      Send INR reminders to:  [facility] COUMADIN MONITORING POOL    Comments:  [facility] [place] Phone   [phone] Fax   [phone]          Anticoagulation Care Providers       Provider Role Specialty Phone number    Emil Burgess MD [facility] Medical Oncology [phone]

## 2024-06-19 NOTE — PROGRESS NOTES
Please let patient know I am happy to report that her mammogram results came back negative for any suspicious masses or otherwise abnormal findings.

## 2024-06-19 NOTE — TELEPHONE ENCOUNTER
----- Message from JOSE RAMON Mcdonough sent at 6/19/2024  2:43 PM CDT -----  Please let patient know I am happy to report that her mammogram results came back negative for any suspicious masses or otherwise abnormal findings.

## 2024-06-25 ENCOUNTER — CLINICAL SUPPORT (OUTPATIENT)
Dept: REHABILITATION | Facility: HOSPITAL | Age: 64
End: 2024-06-25
Payer: MEDICARE

## 2024-06-25 DIAGNOSIS — M62.89 PELVIC FLOOR DYSFUNCTION: Primary | ICD-10-CM

## 2024-06-25 PROCEDURE — 97140 MANUAL THERAPY 1/> REGIONS: CPT | Mod: KX,PO,CQ

## 2024-06-25 NOTE — PROGRESS NOTES
Pelvic Health Physical Therapy   Progress/Treatment Note     Name: Mirna Quinteros  Clinic Number: [id number]    Therapy Diagnosis:   Encounter Diagnosis   Name Primary?    Pelvic floor dysfunction Yes       Physician: Abbie Still MD    Visit Date: 6/25/2024    Physician Orders: PT Eval and Treat   Medical Diagnosis from Referral:  Abdominal pain [R10.9]   Evaluation Date: 5/14/2024  Authorization Period Expiration: 12/31/2024  Plan of Care Expiration: 8/14/2024  Visit # / Visits authorized: 5/ 20  FOTO 3/3 on 6/18/2024        Time In: 12:15 pm   Time Out: 13:00 pm  Total Appointment Time (timed & untimed codes): 40 minutes     Precautions:   12/13/2021 - RADICAL TUMOR DEBULKING INCLUDING TOTAL ABDOMINAL HYSTERECTOMY, BILATERAL SALPINGO-OOPHORECTOMY, OMENTECOMY       Subjective     Pt reports: things are going pretty good, the stool has been helping a lot as she is using the bathroom almost every day now. Less frequent pains than before. She is utilizing some of the techniques she learned in therapy with good results including abdominal/ colon massage.     She was compliant with home exercise program.  Response to previous treatment: good   Functional change: improved regularity of bowel movements     Pain: 0/10 at rest; can spike up to 5-6/10   Location: abdomen     Objective     Abdominal assessment: continued tenderness and scar tissue restrictions that improve with manual interventions. Right rectus abdominis lateral to umbilicus is especially tender to light palpation initially.        Mirna received the following manual therapy techniques: to develop desensitization for 40 minutes including: soft tissue mobilization of abdominal wall -      Myofacial and scar mobilization   Myofascial decompression of global abdominals R>L   Abdominal shearing  ILU massage     Not today:  Kinesiotaping of largest abdominal scar with basket weave/5% tension     Mirna participated in neuromuscular re-education activities  to develop Down training for 0 minutes including: diaphragmatic breathing with single knee to chest stretch and seated piriformis stretches     Diaphragmatic breathing  Diaphragmatic breathing + bearing down with abdominal release    Mirna participated in dynamic functional therapeutic activities to improve functional performance for 13  minutes, including:    [x] FOTO survey administered and reviewed, assessing progression of functional performance   [x] Plan of care review  [x] Anatomy review and discussion of the anatomy on current level of function   [] [facility] edu with functional activities review   [] Coordination of kegels with functional activities such as cough, laugh, sneeze, lift, etc.   [x] Pt edu in the Roll for Control technique to decrease incontinence with strong urge.  Practiced new technique in sitting and standing.    [x] Home exercise program issued and reviewed     Home Exercises Provided and Patient Education Provided     Education provided:   - anatomy/physiology of pelvic floor, diaphragmatic breathing, and colon massage   Discussed progression of plan of care with patient; educated pt in activity modification; reviewed HEP with pt. Pt demonstrated and verbalized understanding of all instruction and was provided with a handout of HEP (see Patient Instructions).      Written Home Exercises Provided: yes.  Exercises were reviewed and Mirna was able to demonstrate them prior to the end of the session.  Mirna demonstrated good  understanding of the education provided.     See EMR under Patient Instructions for exercises provided 5/20/2024.    Assessment     Patient continues to have tenderness and scar tissue restrictions that improve with manual interventions. Myofacial restrictions remain extensive and will require follow up manual therapy but do seem to be improving. It should be noted that patient achieved a bowel movement today immediately following manual therapy.     Mirna Is progressing  well towards her goals.   Pt prognosis is Good.     Pt will continue to benefit from skilled outpatient physical therapy to address the deficits listed in the problem list box on initial evaluation, provide pt/family education and to maximize pt's level of independence in the home and community environment.     Pt's spiritual, cultural and educational needs considered and pt agreeable to plan of care and goals.     Anticipated barriers to physical therapy: none     Goals: (PROGRESSING 6/25/2024 )  Short Term Goals: 6 weeks   - Pt will demonstrate excellent knowledge and adherence to HEP to facilitate optimal recovery.( MET 6/18/2024)  - Pt will demonstrate proper PFM contraction, relaxation, and lengthening coordinated with TA and breath for improved muscle coordination needed for functional activity..( MET 6/18/2024) feels good about samuel her core muscles      Long Term Goals: 12 weeks   - Pt will demonstrate excellent knowledge and adherence to HEP for continued self-maintenance of symptoms..(NOT MET 6/18/2024)  - Pt will report FOTO score of 10% improvement or more indicating clinically relevant increase in function..(PART MET 6/18/2024)  - Pt will report ability to delay urinary urge for at least 10 minutes to maintain continence with ADL/IADLs. .(NOT MET 6/18/2024)   - Pt will report little (drops) to no incidence of urinary incontinence 7/7 days for improved hygiene and ADL/IADL tolerance. .(PART MET 6/18/2024) one time last week  - Pt will demonstrate PFM strength of at least 3/5 MMT for improved strength needed to maintain continence. .(NOT MET 6/18/2024)  - Pt will report bearing down appropriately 100% of the time for improved bowel function and decreased stress on adjacent pelvic structures. .(PART MET 6/18/2024)  - Pt will demonstrate independence with pressure-management strategies to decreased stress on adjacent pelvic structures. .(PART MET 6/18/2024)        Plan    cont per plan of care   Plan  of care Certification: 5/14/2024 to 8/14/2024.    Next visit: perform pelvic coordination work, working especially on bearing down and proprioception training (squeeze vs drop). Continue cupping and return to abdominal taping.     Mari Askew, PTA   6/25/2024

## 2024-06-26 ENCOUNTER — OFFICE VISIT (OUTPATIENT)
Dept: FAMILY MEDICINE | Facility: CLINIC | Age: 64
End: 2024-06-26
Payer: MEDICARE

## 2024-06-26 ENCOUNTER — LAB VISIT (OUTPATIENT)
Dept: LAB | Facility: HOSPITAL | Age: 64
End: 2024-06-26
Attending: INTERNAL MEDICINE
Payer: MEDICARE

## 2024-06-26 ENCOUNTER — ANTI-COAG VISIT (OUTPATIENT)
Dept: CARDIOLOGY | Facility: CLINIC | Age: 64
End: 2024-06-26
Payer: MEDICARE

## 2024-06-26 VITALS
WEIGHT: 236 LBS | OXYGEN SATURATION: 98 % | DIASTOLIC BLOOD PRESSURE: 84 MMHG | BODY MASS INDEX: 37.93 KG/M2 | HEART RATE: 71 BPM | SYSTOLIC BLOOD PRESSURE: 156 MMHG | HEIGHT: 66 IN

## 2024-06-26 DIAGNOSIS — I82.5Z2 CHRONIC DEEP VEIN THROMBOSIS (DVT) OF DISTAL VEIN OF LEFT LOWER EXTREMITY: ICD-10-CM

## 2024-06-26 DIAGNOSIS — F43.23 SITUATIONAL MIXED ANXIETY AND DEPRESSIVE DISORDER: ICD-10-CM

## 2024-06-26 DIAGNOSIS — Z79.01 LONG TERM (CURRENT) USE OF ANTICOAGULANTS: ICD-10-CM

## 2024-06-26 DIAGNOSIS — E11.65 UNCONTROLLED TYPE 2 DIABETES MELLITUS WITH HYPERGLYCEMIA: Primary | ICD-10-CM

## 2024-06-26 DIAGNOSIS — I82.90 VTE (VENOUS THROMBOEMBOLISM): ICD-10-CM

## 2024-06-26 DIAGNOSIS — Z79.01 WARFARIN ANTICOAGULATION: Primary | ICD-10-CM

## 2024-06-26 DIAGNOSIS — D68.59 HYPERCOAGULABLE STATE: ICD-10-CM

## 2024-06-26 DIAGNOSIS — Z79.01 WARFARIN ANTICOAGULATION: ICD-10-CM

## 2024-06-26 DIAGNOSIS — G47.00 INSOMNIA, UNSPECIFIED TYPE: ICD-10-CM

## 2024-06-26 DIAGNOSIS — C57.00 FALLOPIAN TUBE CANCER, CARCINOMA, UNSPECIFIED LATERALITY: ICD-10-CM

## 2024-06-26 DIAGNOSIS — I10 PRIMARY HYPERTENSION: Chronic | ICD-10-CM

## 2024-06-26 LAB
HBA1C MFR BLD: 7.1 %
INR PPP: 1.7 (ref 0.8–1.2)
PROTHROMBIN TIME: 18.6 SEC (ref 9–12.5)

## 2024-06-26 PROCEDURE — 1125F AMNT PAIN NOTED PAIN PRSNT: CPT | Mod: CPTII,S$GLB,,

## 2024-06-26 PROCEDURE — 3288F FALL RISK ASSESSMENT DOCD: CPT | Mod: CPTII,S$GLB,,

## 2024-06-26 PROCEDURE — 3077F SYST BP >= 140 MM HG: CPT | Mod: CPTII,S$GLB,,

## 2024-06-26 PROCEDURE — 3079F DIAST BP 80-89 MM HG: CPT | Mod: CPTII,S$GLB,,

## 2024-06-26 PROCEDURE — 3051F HG A1C>EQUAL 7.0%<8.0%: CPT | Mod: CPTII,S$GLB,,

## 2024-06-26 PROCEDURE — 93793 ANTICOAG MGMT PT WARFARIN: CPT | Mod: S$GLB,,,

## 2024-06-26 PROCEDURE — 3008F BODY MASS INDEX DOCD: CPT | Mod: CPTII,S$GLB,,

## 2024-06-26 PROCEDURE — 85610 PROTHROMBIN TIME: CPT | Performed by: INTERNAL MEDICINE

## 2024-06-26 PROCEDURE — 1101F PT FALLS ASSESS-DOCD LE1/YR: CPT | Mod: CPTII,S$GLB,,

## 2024-06-26 PROCEDURE — 1157F ADVNC CARE PLAN IN RCRD: CPT | Mod: CPTII,S$GLB,,

## 2024-06-26 PROCEDURE — 36415 COLL VENOUS BLD VENIPUNCTURE: CPT | Performed by: INTERNAL MEDICINE

## 2024-06-26 PROCEDURE — 83036 HEMOGLOBIN GLYCOSYLATED A1C: CPT | Mod: QW,,,

## 2024-06-26 RX ORDER — PRAVASTATIN SODIUM 40 MG/1
TABLET ORAL
COMMUNITY
Start: 2024-06-18

## 2024-06-26 RX ORDER — SERTRALINE HYDROCHLORIDE 50 MG/1
50 TABLET, FILM COATED ORAL DAILY
Qty: 90 TABLET | Refills: 3 | Status: SHIPPED | OUTPATIENT
Start: 2024-06-26 | End: 2025-06-26

## 2024-06-26 RX ORDER — TIRZEPATIDE 2.5 MG/.5ML
INJECTION, SOLUTION SUBCUTANEOUS
COMMUNITY
Start: 2024-06-18

## 2024-06-26 RX ORDER — TRAZODONE HYDROCHLORIDE 50 MG/1
50 TABLET ORAL NIGHTLY
Qty: 30 TABLET | Refills: 11 | Status: SHIPPED | OUTPATIENT
Start: 2024-06-26 | End: 2025-06-26

## 2024-06-26 NOTE — PATIENT INSTRUCTIONS
Ask oncology about PET scan, you asked me about what we can do to make sure there is no other evidence of cancer.  Ask oncology about long term plan for treatment- how long do you have to be on the Zejula    Today we resumed sertraline for anxiety at 50mg daily, let me know if we need to increase,  And we changed mirtazapine 15mg to trazodone 50mg for sleep. We can also go up on the dose of this if needed

## 2024-06-28 ENCOUNTER — PATIENT MESSAGE (OUTPATIENT)
Dept: CARDIOLOGY | Facility: CLINIC | Age: 64
End: 2024-06-28
Payer: MEDICARE

## 2024-06-28 ENCOUNTER — OFFICE VISIT (OUTPATIENT)
Dept: PALLIATIVE MEDICINE | Facility: CLINIC | Age: 64
End: 2024-06-28
Payer: MEDICARE

## 2024-06-28 DIAGNOSIS — G89.3 CANCER ASSOCIATED PAIN: ICD-10-CM

## 2024-06-28 DIAGNOSIS — R63.0 ANOREXIA: ICD-10-CM

## 2024-06-28 DIAGNOSIS — R53.83 FATIGUE, UNSPECIFIED TYPE: ICD-10-CM

## 2024-06-28 DIAGNOSIS — Z51.5 ENCOUNTER FOR PALLIATIVE CARE: Primary | ICD-10-CM

## 2024-06-28 DIAGNOSIS — F41.9 ANXIETY: ICD-10-CM

## 2024-06-28 DIAGNOSIS — R63.0 POOR APPETITE: ICD-10-CM

## 2024-06-28 DIAGNOSIS — G89.3 CANCER RELATED PAIN: ICD-10-CM

## 2024-06-28 DIAGNOSIS — I82.5Y3 CHRONIC DEEP VEIN THROMBOSIS (DVT) OF PROXIMAL VEIN OF BOTH LOWER EXTREMITIES: Primary | ICD-10-CM

## 2024-06-28 DIAGNOSIS — G47.00 INSOMNIA, UNSPECIFIED TYPE: ICD-10-CM

## 2024-06-28 DIAGNOSIS — C57.00 FALLOPIAN TUBE CANCER, CARCINOMA, UNSPECIFIED LATERALITY: ICD-10-CM

## 2024-06-28 DIAGNOSIS — K59.00 CONSTIPATION, UNSPECIFIED CONSTIPATION TYPE: ICD-10-CM

## 2024-06-28 DIAGNOSIS — M25.569 KNEE PAIN, UNSPECIFIED CHRONICITY, UNSPECIFIED LATERALITY: ICD-10-CM

## 2024-06-28 DIAGNOSIS — G89.4 CHRONIC PAIN SYNDROME: ICD-10-CM

## 2024-06-28 DIAGNOSIS — R60.9 EDEMA, UNSPECIFIED TYPE: ICD-10-CM

## 2024-06-28 DIAGNOSIS — F43.21 ADJUSTMENT DISORDER WITH DEPRESSED MOOD: ICD-10-CM

## 2024-06-28 NOTE — PROGRESS NOTES
[email or web address]    2024 4:32 PM    Assessment/Plan:    Patient presents today with subtherapeutic  INR.    Assessment of patient findings and chart review: pt reports starting trazodone and sertraline yesterday, monitor INR closely for potential drug interactions (trazodone can decrease INR and sertraline can increase INR)    Recommendation for patient's warfarin regimen: Increase maintenance dose    Recommend repeat INR in 1 week  _________________________________________________________________    Mirna Quinteros (64 y.o.) is followed by the [facility].    Anticoagulation Summary  As of 2024      INR goal:  2.0-2.5   TTR:  16.0% (6.5 mo)   INR used for dosin.7 (2024)   Warfarin maintenance plan:  3.75 mg (7.5 mg x 0.5) every Sun; 7.5 mg (7.5 mg x 1) all other days   Weekly warfarin total:  48.75 mg   Plan last modified:  Evon Sarmiento, PharmD (2024)   Next INR check:  7/3/2024   Target end date:      Indications    Warfarin anticoagulation [Z79.01]  Long term (current) use of anticoagulants [Z79.01]  Chronic deep vein thrombosis (DVT) of distal vein of left lower extremity [I82.5Z2]                 Anticoagulation Episode Summary       INR check location:  Anticoagulation Clinic    Preferred lab:      Send INR reminders to:  [facility] COUMADIN MONITORING POOL    Comments:  [facility] [place] Phone   [phone] Fax   [phone]          Anticoagulation Care Providers       Provider Role Specialty Phone number    Emil Burgess MD [facility] Medical Oncology [phone]

## 2024-06-28 NOTE — PROGRESS NOTES
"The patient location is: [place]  The chief complaint leading to consultation is: symptom mgmt    Visit type: audiovisual    Face to Face time with patient: 38 minutes     51 minutes minutes of total time spent on the encounter, which includes face to face time and non-face to face time preparing to see the patient (eg, review of tests), Obtaining and/or reviewing separately obtained history, Documenting clinical information in the electronic or other health record, Independently interpreting results (not separately reported) and communicating results to the patient/family/caregiver, or Care coordination (not separately reported).       Each patient to whom he or she provides medical services by telemedicine is:  (1) informed of the relationship between the physician and patient and the respective role of any other health care provider with respect to management of the patient; and (2) notified that he or she may decline to receive medical services by telemedicine and may withdraw from such care at any time.    Notes:     Consult Note  Palliative Care      Consult Requested By: No ref. provider found  Reason for Consult: symptom mgmt/ACP       ASSESSMENT/PLAN:     Plan/Recommendations:    06/28/2024:  - pt request ortho referral for knee pain, previously being treated in [place]    Fallopian tube carcinoma  - ID 8/[id number]  - 12/2021 debulking, total hysterectomy and bilateral salpingo-oophorectomy   - following w Dr. Burgess  - ERIKA    Encounter for palliative  - Patient is decisional  - Patient accompanied today by daughter   - HCPOA uploaded into EMR on 2/12/24  - Philosophy of Palliative Medicine reviewed with patient and family at first visit  - New patient folder given to and reviewed with patient and family at first visit  - Goals of care: To "feel normal again". Tolerable symptoms, improved pain.     Cancer pain   - now in PT which includes massaging of abdominal tissues which is helping her pain. She is looking forward " "to taking fewer medications and we discuss the weaning process for pain meds, will hold off until completed PT and feeling a little bit stronger.    - describes as stomach pain "grinding, like something moving"   - currently on MS Contin 30 mg q 12 hour with percocet 7.5 mg, has been using ~ 2.5 tabs percocet daily  - narcan available  - opioid safety sheet given at first visit  - also has tizanidine  - will continue to monitor     Adjustment disorder with anxiety and depression   6/28: she has heightened anxiety due to tension with her daughter. Patient asked her daughter to move out of the house bc she is continuing to treat her like she is very sick and blocking her independence. Patient says she is trying to find her peace as she continues to heal. She feel badly that being away from her daughter feels peaceful but understands that boundaries are needed. Emotional support provided.    - tearful throughout initial visit  - irritable at times  - wants to feel normal and do the things she used to do  - she feels badly for the burden she imposes on her daughter, who had to move in with her to help with her care  - declines mental health counseling   - cont zoloft 100 mg daily  - emotional support provided  - pall care MSW present at initial visit  - will continue to monitor     Anorexia  - not taking, can no longer obtain dronabinol due to shortage   - using remeron 15 mg qhs     Constipation  - BM sometimes once daily but feels incomplete  - following with GI, per note: "rec daily exercise as tolerated, adequate water intake (six 8-oz glasses of water daily), and high fiber diet. OTC fiber supplements are recommended if diet does not reach daily fiber goal (20-30 grams daily), such as Metamucil, Citrucel, or FiberCon (take as directed, separate from other oral medications by >2 hours). 01/31/2024 - START: linaCLOtide (LINZESS) 72 mcg Cap capsule; Take 1 capsule (72 mcg total) by mouth before breakfast."  - " migdalia  - miralax  - endorses good hydration     Insomnia/fatigue  - cont remeron 15 mg qhs    Understanding of illness/Prognosis: fair    Follow up: 12 weeks    Patient's encounter and above plan of care discussed with patient's oncology team.     SUBJECTIVE:     History of Present Illness:  Patient is a 64 y.o. year old female presenting with fallopian tube carcinoma. Please see oncology notes for full oncologic history and treatment course.     06/28/2024:   [place]  reviewed    Patient presents to telehealth visit alone. She is pleasant and in good spirits, she feels herself getting better and is focused on healing and restoring to baseline. She is in PT, exercising 2 days a week. She is having some difficulty sleeping, she is up after 3 or 4 and cannot fall back to sleep. She admits to anxiety r/t her relationship with her daughter. Her daughter is struggling with the transition back to her mother being healthier and more independent, patient feels overwhelmed and controlled by her daughter and asked her to move out. Daughter does still visit frequently and this is very stressful for patient. She is working to strengthen her boundaries and build her peace.       04/05/2024:  [place]  reviewed    Patient presents to telehealth visit with her daughter. She is pleasant and appears to be in good spirits. Her appetite is still poor, increased dronabinol to 10 mg. Pain has been well-controlled, regimen refilled today. Patient is anticipating upcoming colonoscopy, not yet scheduled. She is hopeful the procedure will identify source of BM symptoms. Scheduled to f/u with Dr. Hanson on 4/16.    02/12/2024:  [place]  reviewed    Patient presents to initial visit with her daughter and grandchildren. Her daughter is initially standoffish and brusk but eventually patient daughter are tearful throughout visit. Patient is in severe pain today due to very recent oral surgery. Her cancer related pain is severe and feels like something  is moving inside of her, morphine helps but not enough to make her comfortable. Her goal is to feel normal again; she is currently ERIKA but feels unwell much of the time. She has been caring for her son's children for many years however now that she is sick, her adult daughter has had to put her life on hold and move in with, and help care for, her mother and nephews/nieces. Though they all seem to be struggling emotionally, they decline mental health support at this time, patient says she is thinking about it. RTC in 6-8 weeks.       Past Medical History:   Diagnosis Date    Arthritis     Asthma     C. difficile colitis     Depression     Diabetes mellitus, type 2     Diabetic neuropathy     DVT (deep venous thrombosis)     Enteritis due to Norovirus 02/22/2019    Feeding difficulty in adult 11/22/2021    Formatting of this note might be different from the original. Added automatically from request for surgery [id number] Last Assessment & Plan: Formatting of this note might be different from the original. · Patient with history of ovarian cancer s/p MARCELO/BSO, omentectomy, radical tumor debulking, cytoreduction, appendectomy, partial hepatectomy, and HIPEC per Dr. Forrester and Dr. Jordan 12/13 with postope    GERD (gastroesophageal reflux disease)     Hyperlipidemia     Hypertension     Internal hemorrhoids     Pancreatitis 09/23/2018    Last Assessment & Plan: Formatting of this note might be different from the original. Mirna is a  58 year old female presenting with abdominal pain.  CT abd/pevis w/o contrast revealed acute pancreatitis involving pancreatic head with no fluid collections/necrosis.  EGD from 9/24/2018 unremarkable. Assessment: Today patient is sitting up and interactive.  No acute stress obvious.   Reports pain in    Pulmonary embolism     Respiratory failure with hypoxia 10/06/2021    Rheumatoid arthritis(714.0)     Right upper quadrant pain 08/22/2021    SOB (shortness of breath) 02/24/2023     Past  "Surgical History:   Procedure Laterality Date    CARPAL TUNNEL RELEASE Left     CHOLECYSTECTOMY      COLONOSCOPY      about 10 years ago    COLONOSCOPY N/A 4/9/2024    Procedure: COLONOSCOPY;  Surgeon: Abbie Still MD;  Location: [facility];  Service: Endoscopy;  Laterality: N/A;    KNEE ARTHROSCOPY      KNEE SURGERY      TUBAL LIGATION      UPPER GASTROINTESTINAL ENDOSCOPY       Family History   Problem Relation Name Age of Onset    Heart disease Mother      Hypertension Mother      Diabetes Father      Stroke Father      Hypertension Father      Stroke Sister      Stroke Maternal Aunt      Stroke Maternal Aunt      Breast cancer Neg Hx      Colon cancer Neg Hx      Ovarian cancer Neg Hx      Colon polyps Neg Hx      Esophageal cancer Neg Hx      Stomach cancer Neg Hx       Review of patient's allergies indicates:   Allergen Reactions    Liraglutide Other (See Comments)     pancreatitis    Empagliflozin      Other reaction(s): yeast infxn    Glyburide      Other reaction(s): unknown    Metformin      Other reaction(s): GI upset    Pioglitazone      Other reaction(s): Swelling    Iohexol Itching     Patient given 50mg benadryl im and itching subsided       Medications:    Current Outpatient Medications:     aspirin 81 mg Cap, Take 81 mg by mouth once daily., Disp: , Rfl:     BD INSULIN PEN NEEDLE UF MINI 31 x 3/16 " Ndle, , Disp: , Rfl: 0    droNABinol (MARINOL) 5 MG capsule, Take 2 capsules (10 mg total) by mouth 2 (two) times daily before meals., Disp: 60 capsule, Rfl: 0    ezetimibe (ZETIA) 10 mg tablet, Take 1 tablet (10 mg total) by mouth once daily., Disp: 90 tablet, Rfl: 2    flash glucose sensor (FREESTYLE LUC 2 SENSOR) Kit, 1 each by Misc.(Non-Drug; Combo Route) route as needed., Disp: 1 kit, Rfl: 6    furosemide (LASIX) 40 MG tablet, Take 1 tablet (40 mg total) by mouth 2 (two) times a day., Disp: 180 tablet, Rfl: 3    hydrOXYzine HCL (ATARAX) 10 MG Tab, Take 10 mg by mouth 3 (three) times daily as " needed., Disp: , Rfl:     insulin aspart (NOVOLOG FLEXPEN) 100 unit/mL InPn, Inject 8 Units into the skin 3 (three) times daily with meals., Disp: 1 Box, Rfl: 6    insulin degludec (TRESIBA FLEXTOUCH U-100) 100 unit/mL (3 mL) insulin pen, Inject 32 Units into the skin., Disp: , Rfl:     lansoprazole (PREVACID) 30 MG capsule, TAKE 1 CAPSULE ONE TIME DAILY, Disp: 90 capsule, Rfl: 3    linaCLOtide (LINZESS) 72 mcg Cap capsule, TAKE 1 CAPSULE (72 MCG TOTAL) BY MOUTH BEFORE BREAKFAST, Disp: 90 capsule, Rfl: 3    montelukast (SINGULAIR) 10 mg tablet, Take 1 tablet (10 mg total) by mouth every evening., Disp: 90 tablet, Rfl: 1    morphine (MS CONTIN) 30 MG 12 hr tablet, Take 1 tablet (30 mg total) by mouth 2 (two) times daily., Disp: 60 tablet, Rfl: 0    MOUNJARO 2.5 mg/0.5 mL PnIj, INJECT 2.5 MG SUBCUTANEOUSLY WEEKLY, Disp: , Rfl:     ondansetron (ZOFRAN) 4 MG tablet, Take 1 tablet (4 mg total) by mouth every 8 (eight) hours as needed (Nausea and vomiting)., Disp: 12 tablet, Rfl: 0    pravastatin (PRAVACHOL) 40 MG tablet, Take by mouth., Disp: , Rfl:     rosuvastatin (CRESTOR) 40 MG Tab, Take 20 mg by mouth once daily., Disp: , Rfl:     sertraline (ZOLOFT) 50 MG tablet, Take 1 tablet (50 mg total) by mouth once daily., Disp: 90 tablet, Rfl: 3    sumatriptan (IMITREX) 50 MG tablet, Take by mouth., Disp: , Rfl:     SYMBICORT 160-4.5 mcg/actuation HFAA, Inhale 2 puffs into the lungs every 12 (twelve) hours., Disp: , Rfl:     tirzepatide (MOUNJARO) 5 mg/0.5 mL PnIj, Inject 5 mg into the skin every 7 days. (Patient not taking: Reported on 6/26/2024), Disp: 4 Pen, Rfl: 3    tiZANidine 4 mg Cap, Take 4 mg by mouth 2 (two) times daily as needed., Disp: , Rfl:     traZODone (DESYREL) 50 MG tablet, Take 1 tablet (50 mg total) by mouth every evening., Disp: 30 tablet, Rfl: 11    valsartan-hydrochlorothiazide (DIOVAN-HCT) 80-12.5 mg per tablet, Take 1 tablet by mouth once daily., Disp: , Rfl:     warfarin (COUMADIN) 7.5 MG tablet,  Mon, Tue, Thurs, Fri, Sat, Sun. (Daily EXCEPT Wednesday), Disp: 60 tablet, Rfl: 3    ZEJULA 100 mg Tab, Take 1 tablet by mouth every morning., Disp: , Rfl:     OBJECTIVE:       ROS:  Review of Systems   Constitutional:  Positive for appetite change, fatigue and unexpected weight change.   Eyes:  Negative for pain, discharge and itching.   Respiratory:  Negative for cough, choking and shortness of breath.    Cardiovascular:  Negative for chest pain, palpitations and leg swelling.   Gastrointestinal:  Positive for abdominal pain. Negative for diarrhea, nausea and vomiting.   Endocrine: Negative for polydipsia, polyphagia and polyuria.   Genitourinary:  Negative for difficulty urinating, dyspareunia and dysuria.   Musculoskeletal:  Positive for arthralgias.   Skin:  Negative for pallor, rash and wound.   Neurological:  Positive for weakness.   Psychiatric/Behavioral:  Positive for sleep disturbance. The patient is nervous/anxious.        Review of Symptoms      Symptom Assessment (ESAS 0-10 Scale)  Pain:  8  Dyspnea:  0  Anxiety:  0  Nausea:  0  Depression:  0  Anorexia:  9  Fatigue:  10  Insomnia:  9  Restlessness:  0  Agitation:  0     CAM / Delirium:  Negative  Constipation:  Positive  Diarrhea:  Negative    Anxiety:  Is nervous/anxious    Bowel Management Plan (BMP):  Yes      Pain Assessment:  OME in 24 hours:  75  Location(s): abdomen    Abdomen       Location: generalized        Quality: Cramping        Quantity: 9/10 in intensity        Chronicity: Onset 0 second(s) ago, unchanged        Aggravating Factors: None        Alleviating Factors: Opiates        Associated Symptoms: None    Modified Patrick Scale:  0    Psychosocial/Cultural:   See Palliative Psychosocial Note: Yes  **Primary  to Follow**  Palliative Care  Consult: No      Advance Care Planning   Advance Directives:   Living Will: No    Medical Power of : Yes      Decision Making:  Patient answered questions  Goals of  Care: What is most important right now is to focus on remaining as independent as possible, symptom/pain control, extending life as long as possible, even it it means sacrificing quality. Accordingly, we have decided that the best plan to meet the patient's goals includes continuing with treatment.            Physical Exam:  Vitals:    Telehealth visit      Physical Exam  Constitutional:       General: She is not in acute distress.     Appearance: She is not diaphoretic.   HENT:      Head: Normocephalic and atraumatic.      Right Ear: External ear normal.      Left Ear: External ear normal.      Nose: Nose normal.   Eyes:      General:         Right eye: No discharge.         Left eye: No discharge.      Extraocular Movements: Extraocular movements intact.      Conjunctiva/sclera: Conjunctivae normal.   Pulmonary:      Effort: Pulmonary effort is normal. No respiratory distress.      Breath sounds: No stridor.   Musculoskeletal:      Cervical back: Normal range of motion.   Skin:     General: Skin is warm and dry.      Coloration: Skin is not jaundiced.      Findings: No bruising.   Neurological:      Mental Status: She is alert and oriented to person, place, and time. Mental status is at baseline.   Psychiatric:         Attention and Perception: Attention normal.         Mood and Affect: Mood and affect normal.         Labs:  CBC:   WBC   Date Value Ref Range Status   04/16/2024 4.05 3.90 - 12.70 K/uL Final     Hemoglobin   Date Value Ref Range Status   04/16/2024 11.0 (L) 12.0 - 16.0 g/dL Final     Hematocrit   Date Value Ref Range Status   04/16/2024 33.6 (L) 37.0 - 48.5 % Final     MCV   Date Value Ref Range Status   04/16/2024 92 82 - 98 fL Final     Platelets   Date Value Ref Range Status   04/16/2024 198 150 - 450 K/uL Final       LFT:   Lab Results   Component Value Date    AST 18 04/16/2024    ALKPHOS 69 04/16/2024    BILITOT 0.3 04/16/2024       Albumin:   Albumin   Date Value Ref Range Status   04/16/2024  3.7 3.5 - 5.2 g/dL Final     Protein:   Total Protein   Date Value Ref Range Status   04/16/2024 7.3 6.0 - 8.4 g/dL Final       Radiology:I have reviewed all pertinent imaging results/findings within the past 24 hours.    02/06/2024 CT AP: IMPRESSION:  No acute or significant abnormality seen in the abdomen or pelvis.  Prior cholecystectomy and hysterectomy     This exam was performed according to our departmental dose-optimization program which includes automated exposure control, adjustment of the mA and/or kV according to patient size and/or use of iterative reconstruction technique.      Signature: Chasity Morris, DNP

## 2024-07-01 ENCOUNTER — HOSPITAL ENCOUNTER (OUTPATIENT)
Dept: RADIOLOGY | Facility: HOSPITAL | Age: 64
Discharge: HOME | End: 2024-07-01
Payer: MEDICARE

## 2024-07-01 ENCOUNTER — OFFICE VISIT (OUTPATIENT)
Dept: ORTHOPEDICS | Facility: CLINIC | Age: 64
End: 2024-07-01
Payer: MEDICARE

## 2024-07-01 VITALS — WEIGHT: 235.88 LBS | HEIGHT: 66 IN | BODY MASS INDEX: 37.91 KG/M2

## 2024-07-01 DIAGNOSIS — M25.569 KNEE PAIN, UNSPECIFIED CHRONICITY, UNSPECIFIED LATERALITY: ICD-10-CM

## 2024-07-01 DIAGNOSIS — M17.0 OSTEOARTHRITIS OF BOTH KNEES, UNSPECIFIED OSTEOARTHRITIS TYPE: Primary | ICD-10-CM

## 2024-07-01 DIAGNOSIS — I82.5Y3 CHRONIC DEEP VEIN THROMBOSIS (DVT) OF PROXIMAL VEIN OF BOTH LOWER EXTREMITIES: ICD-10-CM

## 2024-07-01 DIAGNOSIS — R60.9 EDEMA, UNSPECIFIED TYPE: ICD-10-CM

## 2024-07-01 PROCEDURE — 1157F ADVNC CARE PLAN IN RCRD: CPT | Mod: CPTII,S$GLB,, | Performed by: FAMILY MEDICINE

## 2024-07-01 PROCEDURE — 1101F PT FALLS ASSESS-DOCD LE1/YR: CPT | Mod: CPTII,S$GLB,, | Performed by: FAMILY MEDICINE

## 2024-07-01 PROCEDURE — 99214 OFFICE O/P EST MOD 30 MIN: CPT | Mod: S$GLB,,, | Performed by: FAMILY MEDICINE

## 2024-07-01 PROCEDURE — 1159F MED LIST DOCD IN RCRD: CPT | Mod: CPTII,S$GLB,, | Performed by: FAMILY MEDICINE

## 2024-07-01 PROCEDURE — 99999 PR PBB SHADOW E&M-EST. PATIENT-LVL IV: CPT | Mod: PBBFAC,,, | Performed by: FAMILY MEDICINE

## 2024-07-01 PROCEDURE — 3051F HG A1C>EQUAL 7.0%<8.0%: CPT | Mod: CPTII,S$GLB,, | Performed by: FAMILY MEDICINE

## 2024-07-01 PROCEDURE — 3008F BODY MASS INDEX DOCD: CPT | Mod: CPTII,S$GLB,, | Performed by: FAMILY MEDICINE

## 2024-07-01 PROCEDURE — 93971 EXTREMITY STUDY: CPT | Mod: TC,LT

## 2024-07-01 PROCEDURE — 1125F AMNT PAIN NOTED PAIN PRSNT: CPT | Mod: CPTII,S$GLB,, | Performed by: FAMILY MEDICINE

## 2024-07-01 PROCEDURE — 3288F FALL RISK ASSESSMENT DOCD: CPT | Mod: CPTII,S$GLB,, | Performed by: FAMILY MEDICINE

## 2024-07-01 NOTE — PROGRESS NOTES
Subjective     Patient ID: Mirna Quinteros is a 64 y.o. female.    Chief Complaint: Pain of the Left Knee (Pt c/o L) knee pain. Rates pain a 9 on numeric scale. Pt expressing concern that she feels that she may have a blood clot in her L) leg. Pt takes Coumadin. Pt states she does not want any shots today. Pt states pain is chronic. )    Patient known to me from previous visits several months ago here today with complaints of left-sided knee pain secondary to osteoarthritis.  She states that both of her knees bother her but the left is worse than the right.  Known osteoarthritis.  Has been told she needs a knee replacement in the past.  Is currently on Coumadin and being treated with chemotherapy as well as other medical conditions it was not a good surgical candidate.  Had tried and failed all other measures including steroid injections and viscosupplementation.  In January we discussed the possibility of Iovera cryoablation.  Whenever we whenever able to set up an appointment to have the procedure done.  She is still interested in having the procedure on her left knee 1st.    Brings up concern with posterior knee pain.  Has history of chronic DVT in that side.  He is currently on Coumadin.  She has an ultrasound ordered by her PCP to rule out DVT.    Pain  Pertinent negatives include no chest pain, chills, congestion, coughing, headaches, rash or sore throat.       Review of Systems   Constitutional: Negative for chills and decreased appetite.   HENT:  Negative for congestion and sore throat.    Eyes:  Negative for blurred vision.   Cardiovascular:  Negative for chest pain, dyspnea on exertion and palpitations.   Respiratory:  Negative for cough and shortness of breath.    Skin:  Negative for rash.   Neurological:  Negative for difficulty with concentration, disturbances in coordination and headaches.   Psychiatric/Behavioral:  Negative for altered mental status, depression, hallucinations, memory loss and suicidal  ideas.           Objective     General    Nursing note and vitals reviewed.  Constitutional: She is oriented to person, place, and time. She appears well-developed and well-nourished.   HENT:   Nose: Nose normal.   Eyes: EOM are normal. Pupils are equal, round, and reactive to light.   Neck: Neck supple.   Cardiovascular:  Normal rate.            Pulmonary/Chest: Effort normal.   Abdominal: Soft.   Neurological: She is alert and oriented to person, place, and time. She has normal reflexes.   Psychiatric: She has a normal mood and affect. Her behavior is normal. Judgment and thought content normal.           Right Knee Exam     Inspection   Effusion: present    Tenderness   The patient is tender to palpation of the medial joint line.    Crepitus   The patient has crepitus of the patella and medial joint line.    Range of Motion   Extension:  50   Flexion:  140     Tests   Meniscus   David:  Medial - negative Lateral - negative  Ligament Examination   Lachman: normal (-1 to 2mm)   PCL-Posterior Drawer: normal (0 to 2mm)     MCL - Valgus: normal (0 to 2mm)  LCL - Varus: normal  Patella   Patellar apprehension: negative  Passive Patellar Tilt: neutral  Patellar Tracking: normal    Other   Popliteal (Baker's) Cyst: present  Sensation: normal    Left Knee Exam     Inspection   Effusion: present    Tenderness   The patient tender to palpation of the medial joint line.    Crepitus   The patient has crepitus of the patella and medial joint line.    Range of Motion   Extension:  50   Flexion:  140     Tests   Meniscus   David:  Medial - negative Lateral - negative  Stability   Lachman: normal (-1 to 2mm)   PCL-Posterior Drawer: normal (0 to 2mm)  MCL - Valgus: normal (0 to 2mm)  LCL - Varus: normal (0 to 2mm)  Patella   Patellar apprehension: negative  Passive Patellar Tilt: neutral  Patellar Tracking: normal    Other   Popliteal (Baker's) Cyst: present  Sensation: normal    Muscle Strength   Right Lower Extremity    Quadriceps:  5/5   Hamstrin/5   Left Lower Extremity   Quadriceps:  5/5   Hamstrin/5     Vascular Exam     Right Pulses  Dorsalis Pedis:      2+          Left Pulses  Dorsalis Pedis:      2+            Physical Exam  Vitals and nursing note reviewed.   Constitutional:       Appearance: She is well-developed and well-nourished.   HENT:      Nose: Nose normal.   Eyes:      Extraocular Movements: EOM normal.      Pupils: Pupils are equal, round, and reactive to light.   Cardiovascular:      Rate and Rhythm: Normal rate.      Pulses:           Dorsalis pedis pulses are 2+ on the right side and 2+ on the left side.   Pulmonary:      Effort: Pulmonary effort is normal.   Abdominal:      Palpations: Abdomen is soft.   Musculoskeletal:      Cervical back: Neck supple.      Right knee: Effusion present.      Instability Tests: Medial David test negative and lateral David test negative.      Left knee: Effusion present.      Instability Tests: Medial David test negative and lateral David test negative.   Neurological:      Mental Status: She is alert and oriented to person, place, and time.      Deep Tendon Reflexes: Reflexes are normal and symmetric.   Psychiatric:         Mood and Affect: Mood and affect normal.         Behavior: Behavior normal.         Thought Content: Thought content normal.         Judgment: Judgment normal.             Assessment and Plan     Encounter Diagnoses   Name Primary?    Knee pain, unspecified chronicity, unspecified laterality     Osteoarthritis of both knees, unspecified osteoarthritis type Yes         Mirna was seen today for pain.    Diagnoses and all orders for this visit:    Osteoarthritis of both knees, unspecified osteoarthritis type  -     Iovera; Future    Knee pain, unspecified chronicity, unspecified laterality  -     Ambulatory referral/consult to Orthopedics    Going to submit another authorization request for Iovera for both knees.  Will have her follow  up in a few weeks once approved for initial procedure of shallow cryoablation of the genicular nerves on her left knee 1st.  Recommended she schedule an have the ultrasound done to rule out DVT of the left lower extremity.

## 2024-07-02 ENCOUNTER — ANTI-COAG VISIT (OUTPATIENT)
Dept: CARDIOLOGY | Facility: CLINIC | Age: 64
End: 2024-07-02
Payer: MEDICARE

## 2024-07-02 ENCOUNTER — CLINICAL SUPPORT (OUTPATIENT)
Dept: REHABILITATION | Facility: HOSPITAL | Age: 64
End: 2024-07-02
Payer: MEDICARE

## 2024-07-02 ENCOUNTER — LAB VISIT (OUTPATIENT)
Dept: LAB | Facility: HOSPITAL | Age: 64
End: 2024-07-02
Attending: PSYCHIATRY & NEUROLOGY
Payer: MEDICARE

## 2024-07-02 ENCOUNTER — TELEPHONE (OUTPATIENT)
Dept: FAMILY MEDICINE | Facility: CLINIC | Age: 64
End: 2024-07-02
Payer: MEDICARE

## 2024-07-02 DIAGNOSIS — I82.90 VTE (VENOUS THROMBOEMBOLISM): ICD-10-CM

## 2024-07-02 DIAGNOSIS — M62.89 PELVIC FLOOR DYSFUNCTION: Primary | ICD-10-CM

## 2024-07-02 DIAGNOSIS — I82.5Z2 CHRONIC DEEP VEIN THROMBOSIS (DVT) OF DISTAL VEIN OF LEFT LOWER EXTREMITY: ICD-10-CM

## 2024-07-02 DIAGNOSIS — Z79.01 LONG TERM (CURRENT) USE OF ANTICOAGULANTS: ICD-10-CM

## 2024-07-02 DIAGNOSIS — Z79.01 WARFARIN ANTICOAGULATION: Primary | ICD-10-CM

## 2024-07-02 DIAGNOSIS — Z79.01 WARFARIN ANTICOAGULATION: ICD-10-CM

## 2024-07-02 LAB
INR PPP: 2.3 (ref 0.8–1.2)
PROTHROMBIN TIME: 23.9 SEC (ref 9–12.5)

## 2024-07-02 PROCEDURE — 85610 PROTHROMBIN TIME: CPT | Performed by: INTERNAL MEDICINE

## 2024-07-02 PROCEDURE — 36415 COLL VENOUS BLD VENIPUNCTURE: CPT | Performed by: INTERNAL MEDICINE

## 2024-07-02 PROCEDURE — 93793 ANTICOAG MGMT PT WARFARIN: CPT | Mod: S$GLB,,,

## 2024-07-02 PROCEDURE — 97140 MANUAL THERAPY 1/> REGIONS: CPT | Mod: PO

## 2024-07-02 NOTE — PROGRESS NOTES
[facility]    2024 12:55 PM    Assessment/Plan:    Patient presents today with therapeutic INR.    Assessment of patient findings and chart review: Reviewed     Recommendation for patient's warfarin regimen: Continue current maintenance dose    Recommend repeat INR in 2 weeks  _________________________________________________________________    Mirna Quinteros (64 y.o.) is followed by the [facility].    Anticoagulation Summary  As of 2024      INR goal:  2.0-2.5   TTR:  17.0% (6.7 mo)   INR used for dosin.3 (2024)   Warfarin maintenance plan:  3.75 mg (7.5 mg x 0.5) every Sun; 7.5 mg (7.5 mg x 1) all other days   Weekly warfarin total:  48.75 mg   Plan last modified:  Evon Sarmiento, PharmD (2024)   Next INR check:  2024   Target end date:      Indications    Warfarin anticoagulation [Z79.01]  Long term (current) use of anticoagulants [Z79.01]  Chronic deep vein thrombosis (DVT) of distal vein of left lower extremity [I82.5Z2]                 Anticoagulation Episode Summary       INR check location:  Anticoagulation Clinic    Preferred lab:      Send INR reminders to:  [facility] COUMADIN MONITORING POOL    Comments:  [facility] [place] Phone   [phone] Fax   [phone]          Anticoagulation Care Providers       Provider Role Specialty Phone number    Emil Burgess MD [facility] Medical Oncology [phone]

## 2024-07-02 NOTE — TELEPHONE ENCOUNTER
----- Message from JOSE RAMON Mcdonough sent at 7/1/2024  5:36 PM CDT -----  Please let patient know there is no evidence of blood clot remaining in left leg.

## 2024-07-02 NOTE — PATIENT INSTRUCTIONS
""Roll for Control"  Strategies to Delay Voiding     What to do when you feel like you "gotta go gotta go!"      Stop what you are doing.     Take a few good deep breaths (this settles down your nervous system and relaxes your bladder).     WHILE YOU CONTINUE DEEP BREATHING, activate your pelvic floor by performing several quick contractions and releases.  (Pelvic floor contractions send a message to the bladder to relax and hold urine.)  Sitting: Roll thigh in and out slowly or squeeze knees together  Standing: Roll thigh in/out slowly and gently     As you do the above, you can also count backwards from 100 (to help get your mind off the urge!).         After the urge to void has quietly, you may be able to process with your activity OR if it has been approximately 3 hours since you've voided, you may choose to go to the bathroom and void.          Remember......"Control your bladder before it controls YOU!"      ___________________________________________________________________________       Bowel Movement Mechanics  1. Sit on the toilet comfortably with legs and buttocks relaxed.  2. Put your feet on a waste basket or squatty potty  3. Lean forward while keeping your back straight, forearms rest on knees.  4. Keep your knees apart.  5. Fully relax pelvic floor muscles - think about softening, opening, dropping  6. Use gentle belly breaths and bulge lower abdominals like making a beer belly  7. Keep belly big on exhalation  8. Exhale like blowing out birthday candles  9. Keep breathing like this through entire bowel movement  10. Make sure to give enough time, ok for it to take several minutes     Do not strain and Do not hold your breath.       (Search "Squatty Potty" on [employer])         ___________________________________________________________________________         Colon Massage:   start at your right hip, massage up to your right ribcage, across to your left ribcage, and down to your left hip, using " circular motions. Repeat 3 times.      Attempt to have a bowel movement after performing massage.   Perform as needed after each meal or snack        _____________________________________          Single knee to chest stretch  - do your belly breathing with that to help relax your muscles   - do 3 sets on each leg, one minute each.          Seated Child's Pose - sit with knees and feet spread wide. Next, either lean forward to rest elbows on your knees (left) or resting your head and chest against a cushion (right). Think about resting in this position. Hold for 1 min, 3 times

## 2024-07-02 NOTE — PROGRESS NOTES
Pelvic Health Physical Therapy   Progress/Treatment Note     Name: Mirna Quinteros  Clinic Number: [id number]    Therapy Diagnosis:   Encounter Diagnosis   Name Primary?    Pelvic floor dysfunction Yes       Physician: Abbie Still MD    Visit Date: 7/2/2024    Physician Orders: PT Eval and Treat   Medical Diagnosis from Referral:  Abdominal pain [R10.9]   Evaluation Date: 5/14/2024  Authorization Period Expiration: 12/31/2024  Plan of Care Expiration: 8/14/2024  Visit # / Visits authorized: 5/ 20  FOTO 3/3 on 6/18/2024        Time In: 1:00  Time Out: 1:30  Total Appointment Time (timed & untimed codes): 30 minutes     Precautions:   12/13/2021 - RADICAL TUMOR DEBULKING INCLUDING TOTAL ABDOMINAL HYSTERECTOMY, BILATERAL SALPINGO-OOPHORECTOMY, OMENTECOMY       Subjective     Pt reports: abdomen is hurting today, about 6/10 currently. Doesn't seem to be impacted at all by movement or eating.   She's having bowel movements about everyday, fairly easy to go.   Estimates lately she's been having pain 2/7 days. Still doing her colon massage and all of her exercises at home.     She was compliant with home exercise program.  Response to previous treatment: good   Functional change: improved regularity of bowel movements     Pain: 0/10 at rest; can spike up to 5-6/10   Location: abdomen     Objective     Abdominal assessment: continued tenderness and scar tissue restrictions that improve with manual interventions. Right rectus abdominis lateral to umbilicus is especially tender to light palpation initially.        Mirna received the following manual therapy techniques: to develop desensitization for 30 minutes including: soft tissue mobilization of abdominal wall -      Myofacial and scar mobilization   Home exercise program review         Home Exercises Provided and Patient Education Provided     Education provided:   - anatomy/physiology of pelvic floor, diaphragmatic breathing, and colon massage   Discussed  progression of plan of care with patient; educated pt in activity modification; reviewed HEP with pt. Pt demonstrated and verbalized understanding of all instruction and was provided with a handout of HEP (see Patient Instructions).      Written Home Exercises Provided: yes.  Exercises were reviewed and Mirna was able to demonstrate them prior to the end of the session.  Mirna demonstrated good  understanding of the education provided.     See EMR under Patient Instructions for exercises provided 5/20/2024.    Assessment     Patient has made excellent goal progress since start of care. Feels confident in continuing with home exercise program at home.     Mirna Is progressing well towards her goals.   Pt prognosis is Good.       Pt's spiritual, cultural and educational needs considered and pt agreeable to plan of care and goals.     Anticipated barriers to physical therapy: none     Goals: (PROGRESSING 7/2/2024 )  Short Term Goals: 6 weeks   - Pt will demonstrate excellent knowledge and adherence to HEP to facilitate optimal recovery.( MET 6/18/2024)  - Pt will demonstrate proper PFM contraction, relaxation, and lengthening coordinated with TA and breath for improved muscle coordination needed for functional activity..( MET 6/18/2024) feels good about samuel her core muscles      Long Term Goals: 12 weeks   - Pt will demonstrate excellent knowledge and adherence to HEP for continued self-maintenance of symptoms..(NOT MET 6/18/2024)  - Pt will report FOTO score of 10% improvement or more indicating clinically relevant increase in function..(PART MET 6/18/2024)  - Pt will report ability to delay urinary urge for at least 10 minutes to maintain continence with ADL/IADLs. .(MET 7/1/2024)   - Pt will report little (drops) to no incidence of urinary incontinence 7/7 days for improved hygiene and ADL/IADL tolerance. .(MET 7/1/2024)   - Pt will demonstrate PFM strength of at least 3/5 MMT for improved strength needed to  maintain continence. .(NOT MET 6/18/2024)  - Pt will report bearing down appropriately 100% of the time for improved bowel function and decreased stress on adjacent pelvic structures. .(MET 7/1/2024)  - Pt will demonstrate independence with pressure-management strategies to decreased stress on adjacent pelvic structures. .(MET 7/1/2024)        Plan   Patient is appropriate to be discharged and continue with independent HEP at this time. Patient instructed to contact PT with any questions or concerns following discharge.      Dolly Izquierdo, PT   7/2/2024

## 2024-07-07 PROBLEM — G47.00 INSOMNIA: Status: ACTIVE | Noted: 2024-07-07

## 2024-07-07 PROBLEM — F43.23 SITUATIONAL MIXED ANXIETY AND DEPRESSIVE DISORDER: Status: ACTIVE | Noted: 2024-07-07

## 2024-07-08 NOTE — PROGRESS NOTES
"  SUBJECTIVE:    Patient ID: Mirna Quinteros is a 64 y.o. female.    Chief Complaint: Follow-up (No bottles//Pt here for follow up//discuss PT is concerned with surgical site under abdominal fold//Due for foot exam//JL)    64 year old established female patient presents today for follow up. Was here 6 months ago to establish care.     Has significant past medical history. Has undergone treatment for fallopian tube carcinoma, and has other chronic medical conditions on top of this, including DM, HTN, HLD, COPD, h/o DVT. Is currently on coumadin for DVT.   Asks today when do we usually recheck an area when a DVT is diagnosed and being treated. Advised patient that there is not usually any routine imaging at any certain intervals. States she will ask heme/onc at next visit.   Does wonder if she can go ahead and change to heme/onc closer to home rather than going all the way to [place] now that her disease/treatment is more stable.     She is doing much better with anxiety and situational depression than at our previous visit and she states she knows this is directly related to her health improving. Her overall outlook is better, and her demeanor shows this today. She states she has been working on finding her peace. She does still feel she needs medication to help her cope. She is also having some insomnia.     A1c 7.1 today. On Mounjaro 5mg now from Dr. Burgess (heme/onc) in [place].   Last check she was 7.2    Abdominal pain is resolving with physical therapy that is to assist with adhesions and healing.   She does have a "hole" in her incision that she states she feels was maybe bigger or deeper previously, but is getting better.        Follow-up  Associated symptoms include arthralgias, fatigue, headaches and myalgias. Pertinent negatives include no abdominal pain, chest pain, chills, congestion, coughing, fever, nausea, numbness, rash, sore throat, vomiting or weakness.       Lab Visit on 06/26/2024   Component Date " Value Ref Range Status    Prothrombin Time 06/26/2024 18.6 (H)  9.0 - 12.5 sec Final    INR 06/26/2024 1.7 (H)  0.8 - 1.2 Final   Office Visit on 06/26/2024   Component Date Value Ref Range Status    Hemoglobin A1C, POC 06/26/2024 7.1  % Final   Lab Visit on 06/18/2024   Component Date Value Ref Range Status    Prothrombin Time 06/18/2024 14.1 (H)  9.0 - 12.5 sec Final    INR 06/18/2024 1.3 (H)  0.8 - 1.2 Final   Lab Visit on 06/11/2024   Component Date Value Ref Range Status    Prothrombin Time 06/11/2024 13.8 (H)  9.0 - 12.5 sec Final    INR 06/11/2024 1.3 (H)  0.8 - 1.2 Final   Lab Visit on 05/29/2024   Component Date Value Ref Range Status    Prothrombin Time 05/29/2024 22.4 (H)  9.0 - 12.5 sec Final    INR 05/29/2024 2.1 (H)  0.8 - 1.2 Final   Lab Visit on 05/20/2024   Component Date Value Ref Range Status    Prothrombin Time 05/20/2024 17.2 (H)  9.0 - 12.5 sec Final    INR 05/20/2024 1.6 (H)  0.8 - 1.2 Final   Lab Visit on 05/14/2024   Component Date Value Ref Range Status    Prothrombin Time 05/14/2024 17.1 (H)  9.0 - 12.5 sec Final    INR 05/14/2024 1.6 (H)  0.8 - 1.2 Final   Lab Visit on 05/07/2024   Component Date Value Ref Range Status    Prothrombin Time 05/07/2024 17.6 (H)  9.0 - 12.5 sec Final    INR 05/07/2024 1.6 (H)  0.8 - 1.2 Final   Lab Visit on 04/30/2024   Component Date Value Ref Range Status    Prothrombin Time 04/30/2024 31.0 (H)  9.0 - 12.5 sec Final    INR 04/30/2024 3.0 (H)  0.8 - 1.2 Final   Lab Visit on 04/16/2024   Component Date Value Ref Range Status    Prothrombin Time 04/16/2024 18.2 (H)  9.0 - 12.5 sec Final    INR 04/16/2024 1.7 (H)  0.8 - 1.2 Final    WBC 04/16/2024 4.05  3.90 - 12.70 K/uL Final    RBC 04/16/2024 3.65 (L)  4.00 - 5.40 M/uL Final    Hemoglobin 04/16/2024 11.0 (L)  12.0 - 16.0 g/dL Final    Hematocrit 04/16/2024 33.6 (L)  37.0 - 48.5 % Final    MCV 04/16/2024 92  82 - 98 fL Final    MCH 04/16/2024 30.1  27.0 - 31.0 pg Final    MCHC 04/16/2024 32.7  32.0 - 36.0 g/dL  Final    RDW 04/16/2024 14.8 (H)  11.5 - 14.5 % Final    Platelets 04/16/2024 198  150 - 450 K/uL Final    MPV 04/16/2024 10.0  9.2 - 12.9 fL Final    Gran # (ANC) 04/16/2024 2.3  1.8 - 7.7 K/uL Final    Immature Grans (Abs) 04/16/2024 0.01  0.00 - 0.04 K/uL Final     04/16/2024 5  0 - 30 U/mL Final    Sodium 04/16/2024 142  136 - 145 mmol/L Final    Potassium 04/16/2024 4.5  3.5 - 5.1 mmol/L Final    Chloride 04/16/2024 108  95 - 110 mmol/L Final    CO2 04/16/2024 26  23 - 29 mmol/L Final    Glucose 04/16/2024 123 (H)  70 - 110 mg/dL Final    BUN 04/16/2024 13  8 - 23 mg/dL Final    Creatinine 04/16/2024 0.9  0.5 - 1.4 mg/dL Final    Calcium 04/16/2024 9.7  8.7 - 10.5 mg/dL Final    Total Protein 04/16/2024 7.3  6.0 - 8.4 g/dL Final    Albumin 04/16/2024 3.7  3.5 - 5.2 g/dL Final    Total Bilirubin 04/16/2024 0.3  0.1 - 1.0 mg/dL Final    Alkaline Phosphatase 04/16/2024 69  55 - 135 U/L Final    AST 04/16/2024 18  10 - 40 U/L Final    ALT 04/16/2024 13  10 - 44 U/L Final    eGFR 04/16/2024 >60.0  >60 mL/min/1.73 m^2 Final    Anion Gap 04/16/2024 8  8 - 16 mmol/L Final   There may be more visits with results that are not included.       Past Medical History:   Diagnosis Date    Arthritis     Asthma     C. difficile colitis     Depression     Diabetes mellitus, type 2     Diabetic neuropathy     DVT (deep venous thrombosis)     Enteritis due to Norovirus 02/22/2019    Feeding difficulty in adult 11/22/2021    Formatting of this note might be different from the original. Added automatically from request for surgery [id number] Last Assessment & Plan: Formatting of this note might be different from the original. · Patient with history of ovarian cancer s/p MARCELO/BSO, omentectomy, radical tumor debulking, cytoreduction, appendectomy, partial hepatectomy, and HIPEC per Dr. Forrester and Dr. Jordan 12/13 with postope    GERD (gastroesophageal reflux disease)     Hyperlipidemia     Hypertension     Internal hemorrhoids      Pancreatitis 2018    Last Assessment & Plan: Formatting of this note might be different from the original. Mirna is a  58 year old female presenting with abdominal pain.  CT abd/pevis w/o contrast revealed acute pancreatitis involving pancreatic head with no fluid collections/necrosis.  EGD from 2018 unremarkable. Assessment: Today patient is sitting up and interactive.  No acute stress obvious.   Reports pain in    Pulmonary embolism     Respiratory failure with hypoxia 10/06/2021    Rheumatoid arthritis(714.0)     Right upper quadrant pain 2021    SOB (shortness of breath) 2023     Social History     Socioeconomic History    Marital status: Single   Tobacco Use    Smoking status: Former     Current packs/day: 0.00     Average packs/day: 0.5 packs/day for 49.7 years (24.8 ttl pk-yrs)     Types: Cigarettes     Start date: 1972     Quit date: 2021     Years since quittin.8    Smokeless tobacco: Never    Tobacco comments:      PPD   Substance and Sexual Activity    Alcohol use: No     Comment: pt states quit drinking 1 mo 1/2 ago    Drug use: Yes     Types: Marijuana     Comment: marinol pill for appetite    Sexual activity: Not Currently     Social Determinants of Health     Financial Resource Strain: Low Risk  (2023)    Overall Financial Resource Strain (CARDIA)     Difficulty of Paying Living Expenses: Not very hard   Recent Concern: Financial Resource Strain - Medium Risk (2023)    Received from [facility], [facility]    Overall Financial Resource Strain (CARDIA)     Difficulty of Paying Living Expenses: Somewhat hard   Food Insecurity: Food Insecurity Present (2023)    Hunger Vital Sign     Worried About Running Out of Food in the Last Year: Sometimes true     Ran Out of Food in the Last Year: Sometimes true    Transportation Needs: No Transportation Needs (12/4/2023)    PRAPARE - Transportation     Lack of Transportation (Medical): No     Lack of Transportation (Non-Medical): No   Physical Activity: Insufficiently Active (12/4/2023)    Exercise Vital Sign     Days of Exercise per Week: 2 days     Minutes of Exercise per Session: 30 min   Stress: Stress Concern Present (12/4/2023)    [employer] [place] [employer] - Occupational Stress Questionnaire     Feeling of Stress : Very much   Housing Stability: Low Risk  (12/4/2023)    Housing Stability Vital Sign     Unable to Pay for Housing in the Last Year: No     Number of Places Lived in the Last Year: 2     Unstable Housing in the Last Year: No     Past Surgical History:   Procedure Laterality Date    CARPAL TUNNEL RELEASE Left     CHOLECYSTECTOMY      COLONOSCOPY      about 10 years ago    COLONOSCOPY N/A 4/9/2024    Procedure: COLONOSCOPY;  Surgeon: Abbie Still MD;  Location[facility];  Service: Endoscopy;  Laterality: N/A;    KNEE ARTHROSCOPY      KNEE SURGERY      TUBAL LIGATION      UPPER GASTROINTESTINAL ENDOSCOPY       Family History   Problem Relation Name Age of Onset    Heart disease Mother      Hypertension Mother      Diabetes Father      Stroke Father      Hypertension Father      Stroke Sister      Stroke Maternal Aunt      Stroke Maternal Aunt      Breast cancer Neg Hx      Colon cancer Neg Hx      Ovarian cancer Neg Hx      Colon polyps Neg Hx      Esophageal cancer Neg Hx      Stomach cancer Neg Hx         Review of patient's allergies indicates:   Allergen Reactions    Liraglutide Other (See Comments)     pancreatitis    Empagliflozin      Other reaction(s): yeast infxn    Glyburide      Other reaction(s): unknown    Metformin      Other reaction(s): GI upset    Pioglitazone      Other reaction(s): Swelling    Iohexol Itching     Patient given 50mg benadryl im and itching subsided       Current Outpatient Medications:     aspirin 81 mg  "Cap, Take 81 mg by mouth once daily., Disp: , Rfl:     BD INSULIN PEN NEEDLE UF MINI 31 x 3/16 " Ndle, , Disp: , Rfl: 0    droNABinol (MARINOL) 5 MG capsule, Take 2 capsules (10 mg total) by mouth 2 (two) times daily before meals., Disp: 60 capsule, Rfl: 0    ezetimibe (ZETIA) 10 mg tablet, Take 1 tablet (10 mg total) by mouth once daily., Disp: 90 tablet, Rfl: 2    flash glucose sensor (FREESTYLE LUC 2 SENSOR) Kit, 1 each by Misc.(Non-Drug; Combo Route) route as needed., Disp: 1 kit, Rfl: 6    furosemide (LASIX) 40 MG tablet, Take 1 tablet (40 mg total) by mouth 2 (two) times a day., Disp: 180 tablet, Rfl: 3    hydrOXYzine HCL (ATARAX) 10 MG Tab, Take 10 mg by mouth 3 (three) times daily as needed., Disp: , Rfl:     insulin aspart (NOVOLOG FLEXPEN) 100 unit/mL InPn, Inject 8 Units into the skin 3 (three) times daily with meals., Disp: 1 Box, Rfl: 6    insulin degludec (TRESIBA FLEXTOUCH U-100) 100 unit/mL (3 mL) insulin pen, Inject 32 Units into the skin., Disp: , Rfl:     lansoprazole (PREVACID) 30 MG capsule, TAKE 1 CAPSULE ONE TIME DAILY, Disp: 90 capsule, Rfl: 3    linaCLOtide (LINZESS) 72 mcg Cap capsule, TAKE 1 CAPSULE (72 MCG TOTAL) BY MOUTH BEFORE BREAKFAST, Disp: 90 capsule, Rfl: 3    montelukast (SINGULAIR) 10 mg tablet, Take 1 tablet (10 mg total) by mouth every evening., Disp: 90 tablet, Rfl: 1    morphine (MS CONTIN) 30 MG 12 hr tablet, Take 1 tablet (30 mg total) by mouth 2 (two) times daily., Disp: 60 tablet, Rfl: 0    MOUNJARO 2.5 mg/0.5 mL PnIj, INJECT 2.5 MG SUBCUTANEOUSLY WEEKLY, Disp: , Rfl:     ondansetron (ZOFRAN) 4 MG tablet, Take 1 tablet (4 mg total) by mouth every 8 (eight) hours as needed (Nausea and vomiting)., Disp: 12 tablet, Rfl: 0    pravastatin (PRAVACHOL) 40 MG tablet, Take by mouth., Disp: , Rfl:     rosuvastatin (CRESTOR) 40 MG Tab, Take 20 mg by mouth once daily., Disp: , Rfl:     sumatriptan (IMITREX) 50 MG tablet, Take by mouth., Disp: , Rfl:     SYMBICORT 160-4.5 " mcg/actuation HFAA, Inhale 2 puffs into the lungs every 12 (twelve) hours., Disp: , Rfl:     tiZANidine 4 mg Cap, Take 4 mg by mouth 2 (two) times daily as needed., Disp: , Rfl:     valsartan-hydrochlorothiazide (DIOVAN-HCT) 80-12.5 mg per tablet, Take 1 tablet by mouth once daily., Disp: , Rfl:     warfarin (COUMADIN) 7.5 MG tablet, Mon, Tue, Thurs, Fri, Sat, Sun. (Daily EXCEPT Wednesday), Disp: 60 tablet, Rfl: 3    ZEJULA 100 mg Tab, Take 1 tablet by mouth every morning., Disp: , Rfl:     sertraline (ZOLOFT) 50 MG tablet, Take 1 tablet (50 mg total) by mouth once daily., Disp: 90 tablet, Rfl: 3    tirzepatide (MOUNJARO) 5 mg/0.5 mL PnIj, Inject 5 mg into the skin every 7 days., Disp: 4 Pen, Rfl: 3    traZODone (DESYREL) 50 MG tablet, Take 1 tablet (50 mg total) by mouth every evening., Disp: 30 tablet, Rfl: 11    Review of Systems   Constitutional:  Positive for activity change, appetite change and fatigue. Negative for chills, fever and unexpected weight change.   HENT:  Negative for nasal congestion, ear pain, postnasal drip, rhinorrhea, sore throat and trouble swallowing.    Eyes:  Negative for discharge and visual disturbance.   Respiratory:  Negative for apnea, cough, shortness of breath and wheezing.    Cardiovascular:  Negative for chest pain, palpitations and leg swelling.   Gastrointestinal:  Positive for reflux. Negative for abdominal pain, blood in stool, constipation, diarrhea, nausea and vomiting.   Genitourinary:  Negative for bladder incontinence, dysuria, frequency and urgency.   Musculoskeletal:  Positive for arthralgias, back pain and myalgias. Negative for gait problem and leg pain.   Integumentary:  Negative for rash, mole/lesion and breast mass.   Neurological:  Positive for headaches. Negative for dizziness, tremors, weakness, light-headedness and numbness.   Hematological:  Bruises/bleeds easily.   Psychiatric/Behavioral:  Positive for sleep disturbance. Negative for dysphoric mood and  "suicidal ideas. The patient is nervous/anxious.    Breast: Negative for mass          Objective:      Vitals:    06/26/24 1040   BP: (!) 156/84   Pulse: 71   SpO2: 98%   Weight: 107 kg (236 lb)   Height: 5' 6" (1.676 m)     Physical Exam  Vitals and nursing note reviewed.   Constitutional:       General: She is not in acute distress.     Appearance: Normal appearance. She is well-developed and well-groomed. She is obese. She is not ill-appearing, toxic-appearing or diaphoretic.   HENT:      Head: Normocephalic and atraumatic.      Right Ear: External ear normal.      Left Ear: External ear normal.      Nose: Nose normal. No rhinorrhea.      Mouth/Throat:      Pharynx: Oropharynx is clear.   Eyes:      General: No scleral icterus.     Pupils: Pupils are equal, round, and reactive to light.   Neck:      Thyroid: No thyromegaly.      Vascular: No carotid bruit or JVD.   Cardiovascular:      Rate and Rhythm: Regular rhythm. No extrasystoles are present.     Pulses:           Radial pulses are 2+ on the right side and 2+ on the left side.      Heart sounds: Normal heart sounds. No murmur heard.  Pulmonary:      Effort: Pulmonary effort is normal.      Breath sounds: Normal breath sounds.   Abdominal:      General: Bowel sounds are normal.      Palpations: Abdomen is soft.      Tenderness: There is no abdominal tenderness.      Comments: Abdominal incisions healed well. There is a small "pit" at the junction of transverse and vertical incisions, but it does appear to be closed internally. There is no drainage. Patient states there may have been drainage several months ago.   Musculoskeletal:         General: No tenderness or deformity. Normal range of motion.      Cervical back: Normal range of motion and neck supple.      Lumbar back: Normal. No spasms.      Right lower leg: No edema.      Left lower leg: No edema.      Comments: Crepitus to bilateral knees   Skin:     General: Skin is warm and dry.      Capillary " Refill: Capillary refill takes less than 2 seconds.      Coloration: Skin is not jaundiced or pale.      Findings: No rash.   Neurological:      General: No focal deficit present.      Mental Status: She is alert and oriented to person, place, and time.      Cranial Nerves: No cranial nerve deficit, dysarthria or facial asymmetry.      Sensory: No sensory deficit.      Motor: No weakness or tremor.      Coordination: Coordination normal.      Gait: Gait abnormal.      Comments: Chronic pain, weakness to bilateral knees   Psychiatric:         Attention and Perception: Attention and perception normal.         Mood and Affect: Mood normal.         Speech: Speech normal.         Behavior: Behavior is cooperative.         Thought Content: Thought content does not include homicidal or suicidal ideation.         Cognition and Memory: Cognition normal.           Assessment:       1. Uncontrolled type 2 diabetes mellitus with hyperglycemia    2. Primary hypertension    3. Fallopian tube cancer, carcinoma, unspecified laterality    4. Hypercoagulable state    5. Chronic deep vein thrombosis (DVT) of distal vein of left lower extremity    6. Warfarin anticoagulation    7. Situational mixed anxiety and depressive disorder    8. Insomnia, unspecified type         Plan:       Uncontrolled type 2 diabetes mellitus with hyperglycemia  Just had increase of Mounjaro to 5mg. Also on insulin. Will recheck in 3 mos.   -     POCT HEMOGLOBIN A1C  -     Foot Exam Performed    Primary hypertension  Elevated her today but pt states she is controlled at home. Continue as is.    Fallopian tube cancer, carcinoma, unspecified laterality  Hypercoagulable state  Chronic deep vein thrombosis (DVT) of distal vein of left lower extremity  Warfarin anticoagulation  Would like to consult a local physician about transferring her care closer to home  -     Ambulatory referral/consult to Hematology / Oncology; Future; Expected date:  07/03/2024    Situational mixed anxiety and depressive disorder  Resume Zoloft at 50mg. Understands possible side effects.  -     sertraline (ZOLOFT) 50 MG tablet; Take 1 tablet (50 mg total) by mouth once daily.  Dispense: 90 tablet; Refill: 3    Insomnia, unspecified type  Change from Remeron to trazodone.   -     traZODone (DESYREL) 50 MG tablet; Take 1 tablet (50 mg total) by mouth every evening.  Dispense: 30 tablet; Refill: 11      Follow up in about 3 months (around 9/26/2024) for Diabetic Check Up.        7/7/2024 Hazel Veras

## 2024-07-09 ENCOUNTER — TELEPHONE (OUTPATIENT)
Facility: CLINIC | Age: 64
End: 2024-07-09
Payer: MEDICARE

## 2024-07-09 NOTE — NURSING
Oncology Navigation   Intake  Cancer Type: Benign hem  Type of Referral: Internal  Date of Referral: 06/26/24  Initial Nurse Navigator Contact: 07/09/24  Referral to Initial Contact Timeline (days): 13  Date Worked: 03/20/24                            Acuity      Follow Up  No follow-ups on file.     Left voicemail notifying patient of received new patient referral for a second opinion from Dr. Davis regarding patient DVT. Current hem/onc Dr. Burgess. Call back number given.

## 2024-07-10 ENCOUNTER — TELEPHONE (OUTPATIENT)
Facility: CLINIC | Age: 64
End: 2024-07-10
Payer: MEDICARE

## 2024-07-10 NOTE — NURSING
Oncology Navigation   Intake  Cancer Type: Benign hem  Type of Referral: Internal  Date of Referral: 06/26/24  Initial Nurse Navigator Contact: 07/09/24  Referral to Initial Contact Timeline (days): 13  Date Worked: 03/20/24     Treatment        Medical Oncologist: Emil Burgess MD- pt recently moved to area and established care on 12/5/23                       Acuity      Follow Up       Spoke with patient in regards to second opinion referral from ARTURO Veras for DVT. Patient stated that she had a recent ultrasound that was clear so she no longer needs second opinion.

## 2024-07-14 ENCOUNTER — PATIENT MESSAGE (OUTPATIENT)
Dept: FAMILY MEDICINE | Facility: CLINIC | Age: 64
End: 2024-07-14
Payer: MEDICARE

## 2024-07-14 DIAGNOSIS — C57.00 FALLOPIAN TUBE CANCER, CARCINOMA, UNSPECIFIED LATERALITY: ICD-10-CM

## 2024-07-14 DIAGNOSIS — I82.90 VTE (VENOUS THROMBOEMBOLISM): ICD-10-CM

## 2024-07-15 ENCOUNTER — PATIENT MESSAGE (OUTPATIENT)
Dept: PALLIATIVE MEDICINE | Facility: CLINIC | Age: 64
End: 2024-07-15
Payer: MEDICARE

## 2024-07-15 DIAGNOSIS — G89.3 CANCER ASSOCIATED PAIN: ICD-10-CM

## 2024-07-15 RX ORDER — MORPHINE SULFATE 30 MG/1
30 TABLET, FILM COATED, EXTENDED RELEASE ORAL 2 TIMES DAILY
Qty: 60 TABLET | Refills: 0 | Status: SHIPPED | OUTPATIENT
Start: 2024-07-15

## 2024-07-15 RX ORDER — OXYCODONE AND ACETAMINOPHEN 7.5; 325 MG/1; MG/1
1 TABLET ORAL EVERY 6 HOURS PRN
Qty: 120 TABLET | Refills: 0 | Status: SHIPPED | OUTPATIENT
Start: 2024-07-15 | End: 2024-08-14

## 2024-07-15 RX ORDER — WARFARIN 7.5 MG/1
TABLET ORAL
Qty: 60 TABLET | Refills: 3 | Status: SHIPPED | OUTPATIENT
Start: 2024-07-15

## 2024-07-15 RX ORDER — DRONABINOL 5 MG/1
10 CAPSULE ORAL
Qty: 60 CAPSULE | Refills: 0 | Status: SHIPPED | OUTPATIENT
Start: 2024-07-15

## 2024-07-15 RX ORDER — MORPHINE SULFATE 30 MG/1
30 TABLET, FILM COATED, EXTENDED RELEASE ORAL 2 TIMES DAILY
Qty: 60 TABLET | Refills: 0 | OUTPATIENT
Start: 2024-07-15

## 2024-07-16 ENCOUNTER — OFFICE VISIT (OUTPATIENT)
Dept: GYNECOLOGIC ONCOLOGY | Facility: CLINIC | Age: 64
End: 2024-07-16
Payer: MEDICARE

## 2024-07-16 ENCOUNTER — TELEPHONE (OUTPATIENT)
Dept: ORTHOPEDICS | Facility: CLINIC | Age: 64
End: 2024-07-16
Payer: MEDICARE

## 2024-07-16 ENCOUNTER — LAB VISIT (OUTPATIENT)
Dept: LAB | Facility: HOSPITAL | Age: 64
End: 2024-07-16
Attending: OBSTETRICS & GYNECOLOGY
Payer: MEDICARE

## 2024-07-16 VITALS
DIASTOLIC BLOOD PRESSURE: 53 MMHG | HEART RATE: 53 BPM | HEIGHT: 66 IN | WEIGHT: 238.75 LBS | BODY MASS INDEX: 38.37 KG/M2 | SYSTOLIC BLOOD PRESSURE: 158 MMHG

## 2024-07-16 DIAGNOSIS — C57.00 FALLOPIAN TUBE CANCER, CARCINOMA, UNSPECIFIED LATERALITY: Primary | ICD-10-CM

## 2024-07-16 DIAGNOSIS — Z01.818 EXAMINATION PRIOR TO CHEMOTHERAPY: ICD-10-CM

## 2024-07-16 DIAGNOSIS — C57.00 FALLOPIAN TUBE CANCER, CARCINOMA, UNSPECIFIED LATERALITY: ICD-10-CM

## 2024-07-16 LAB
ALBUMIN SERPL BCP-MCNC: 3.4 G/DL (ref 3.5–5.2)
ALP SERPL-CCNC: 95 U/L (ref 55–135)
ALT SERPL W/O P-5'-P-CCNC: 93 U/L (ref 10–44)
ANION GAP SERPL CALC-SCNC: 7 MMOL/L (ref 8–16)
AST SERPL-CCNC: 73 U/L (ref 10–40)
BILIRUB SERPL-MCNC: 0.3 MG/DL (ref 0.1–1)
BUN SERPL-MCNC: 17 MG/DL (ref 8–23)
CALCIUM SERPL-MCNC: 9.7 MG/DL (ref 8.7–10.5)
CANCER AG125 SERPL-ACNC: 6 U/ML (ref 0–30)
CHLORIDE SERPL-SCNC: 107 MMOL/L (ref 95–110)
CO2 SERPL-SCNC: 29 MMOL/L (ref 23–29)
CREAT SERPL-MCNC: 1 MG/DL (ref 0.5–1.4)
ERYTHROCYTE [DISTWIDTH] IN BLOOD BY AUTOMATED COUNT: 14.4 % (ref 11.5–14.5)
EST. GFR  (NO RACE VARIABLE): >60 ML/MIN/1.73 M^2
GLUCOSE SERPL-MCNC: 127 MG/DL (ref 70–110)
HCT VFR BLD AUTO: 33 % (ref 37–48.5)
HGB BLD-MCNC: 11.4 G/DL (ref 12–16)
IMM GRANULOCYTES # BLD AUTO: 0.01 K/UL (ref 0–0.04)
MCH RBC QN AUTO: 31.2 PG (ref 27–31)
MCHC RBC AUTO-ENTMCNC: 34.5 G/DL (ref 32–36)
MCV RBC AUTO: 90 FL (ref 82–98)
NEUTROPHILS # BLD AUTO: 2.5 K/UL (ref 1.8–7.7)
PLATELET # BLD AUTO: 199 K/UL (ref 150–450)
PMV BLD AUTO: 9.9 FL (ref 9.2–12.9)
POTASSIUM SERPL-SCNC: 4.7 MMOL/L (ref 3.5–5.1)
PROT SERPL-MCNC: 7 G/DL (ref 6–8.4)
RBC # BLD AUTO: 3.65 M/UL (ref 4–5.4)
SODIUM SERPL-SCNC: 143 MMOL/L (ref 136–145)
WBC # BLD AUTO: 4.69 K/UL (ref 3.9–12.7)

## 2024-07-16 PROCEDURE — 3077F SYST BP >= 140 MM HG: CPT | Mod: CPTII,S$GLB,, | Performed by: OBSTETRICS & GYNECOLOGY

## 2024-07-16 PROCEDURE — 36415 COLL VENOUS BLD VENIPUNCTURE: CPT | Performed by: OBSTETRICS & GYNECOLOGY

## 2024-07-16 PROCEDURE — 3078F DIAST BP <80 MM HG: CPT | Mod: CPTII,S$GLB,, | Performed by: OBSTETRICS & GYNECOLOGY

## 2024-07-16 PROCEDURE — 86304 IMMUNOASSAY TUMOR CA 125: CPT | Performed by: OBSTETRICS & GYNECOLOGY

## 2024-07-16 PROCEDURE — 99999 PR PBB SHADOW E&M-EST. PATIENT-LVL IV: CPT | Mod: PBBFAC,,, | Performed by: OBSTETRICS & GYNECOLOGY

## 2024-07-16 PROCEDURE — 3288F FALL RISK ASSESSMENT DOCD: CPT | Mod: CPTII,S$GLB,, | Performed by: OBSTETRICS & GYNECOLOGY

## 2024-07-16 PROCEDURE — 1159F MED LIST DOCD IN RCRD: CPT | Mod: CPTII,S$GLB,, | Performed by: OBSTETRICS & GYNECOLOGY

## 2024-07-16 PROCEDURE — 1157F ADVNC CARE PLAN IN RCRD: CPT | Mod: CPTII,S$GLB,, | Performed by: OBSTETRICS & GYNECOLOGY

## 2024-07-16 PROCEDURE — 80053 COMPREHEN METABOLIC PANEL: CPT | Performed by: OBSTETRICS & GYNECOLOGY

## 2024-07-16 PROCEDURE — 3051F HG A1C>EQUAL 7.0%<8.0%: CPT | Mod: CPTII,S$GLB,, | Performed by: OBSTETRICS & GYNECOLOGY

## 2024-07-16 PROCEDURE — 3008F BODY MASS INDEX DOCD: CPT | Mod: CPTII,S$GLB,, | Performed by: OBSTETRICS & GYNECOLOGY

## 2024-07-16 PROCEDURE — 1125F AMNT PAIN NOTED PAIN PRSNT: CPT | Mod: CPTII,S$GLB,, | Performed by: OBSTETRICS & GYNECOLOGY

## 2024-07-16 PROCEDURE — 1101F PT FALLS ASSESS-DOCD LE1/YR: CPT | Mod: CPTII,S$GLB,, | Performed by: OBSTETRICS & GYNECOLOGY

## 2024-07-16 PROCEDURE — 1160F RVW MEDS BY RX/DR IN RCRD: CPT | Mod: CPTII,S$GLB,, | Performed by: OBSTETRICS & GYNECOLOGY

## 2024-07-16 PROCEDURE — 99215 OFFICE O/P EST HI 40 MIN: CPT | Mod: S$GLB,,, | Performed by: OBSTETRICS & GYNECOLOGY

## 2024-07-16 PROCEDURE — 85027 COMPLETE CBC AUTOMATED: CPT | Performed by: OBSTETRICS & GYNECOLOGY

## 2024-07-16 NOTE — TELEPHONE ENCOUNTER
This nurse attempted to return call to patient but did not receive an answer. Attempted to notify patient regarding PA denial and offer a cash pay option if interested.

## 2024-07-25 NOTE — PROGRESS NOTES
Patient screened positive for food insecurity and meets criteria for [facility]. Monthly visit for June completed today (6/4/24).

## 2024-08-07 DIAGNOSIS — E11.49 DIABETES MELLITUS TYPE 2 WITH NEUROLOGICAL MANIFESTATIONS: Chronic | ICD-10-CM

## 2024-08-07 DIAGNOSIS — C57.00 FALLOPIAN TUBE CANCER, CARCINOMA, UNSPECIFIED LATERALITY: ICD-10-CM

## 2024-08-07 DIAGNOSIS — K21.9 GASTROESOPHAGEAL REFLUX DISEASE, UNSPECIFIED WHETHER ESOPHAGITIS PRESENT: ICD-10-CM

## 2024-08-07 DIAGNOSIS — G89.3 CANCER ASSOCIATED PAIN: ICD-10-CM

## 2024-08-07 DIAGNOSIS — I82.90 VTE (VENOUS THROMBOEMBOLISM): ICD-10-CM

## 2024-08-07 RX ORDER — MORPHINE SULFATE 30 MG/1
30 TABLET, FILM COATED, EXTENDED RELEASE ORAL 2 TIMES DAILY
Qty: 60 TABLET | Refills: 0 | Status: SHIPPED | OUTPATIENT
Start: 2024-08-14

## 2024-08-07 RX ORDER — LANSOPRAZOLE 30 MG/1
30 CAPSULE, DELAYED RELEASE ORAL DAILY
Qty: 90 CAPSULE | Refills: 3 | Status: SHIPPED | OUTPATIENT
Start: 2024-08-07

## 2024-08-07 RX ORDER — TIRZEPATIDE 5 MG/.5ML
INJECTION, SOLUTION SUBCUTANEOUS
Qty: 12 PEN | Refills: 3 | Status: SHIPPED | OUTPATIENT
Start: 2024-08-07

## 2024-08-07 RX ORDER — DRONABINOL 5 MG/1
10 CAPSULE ORAL
Qty: 60 CAPSULE | Refills: 0 | Status: SHIPPED | OUTPATIENT
Start: 2024-08-07

## 2024-08-07 RX ORDER — WARFARIN 7.5 MG/1
TABLET ORAL
Qty: 60 TABLET | Refills: 3 | Status: SHIPPED | OUTPATIENT
Start: 2024-08-07

## 2024-08-07 RX ORDER — OXYCODONE AND ACETAMINOPHEN 7.5; 325 MG/1; MG/1
1 TABLET ORAL EVERY 6 HOURS PRN
Qty: 120 TABLET | Refills: 0 | Status: SHIPPED | OUTPATIENT
Start: 2024-08-14 | End: 2024-09-13

## 2024-08-07 RX ORDER — FUROSEMIDE 40 MG/1
40 TABLET ORAL 2 TIMES DAILY
Qty: 180 TABLET | Refills: 3 | Status: SHIPPED | OUTPATIENT
Start: 2024-08-07 | End: 2025-08-07

## 2024-08-16 DIAGNOSIS — C57.00 FALLOPIAN TUBE CANCER, CARCINOMA, UNSPECIFIED LATERALITY: ICD-10-CM

## 2024-08-16 DIAGNOSIS — K21.9 GASTROESOPHAGEAL REFLUX DISEASE, UNSPECIFIED WHETHER ESOPHAGITIS PRESENT: ICD-10-CM

## 2024-08-16 DIAGNOSIS — G89.3 CANCER ASSOCIATED PAIN: ICD-10-CM

## 2024-08-16 RX ORDER — DRONABINOL 5 MG/1
10 CAPSULE ORAL
Qty: 60 CAPSULE | Refills: 0 | Status: SHIPPED | OUTPATIENT
Start: 2024-08-16

## 2024-08-16 RX ORDER — LANSOPRAZOLE 30 MG/1
30 CAPSULE, DELAYED RELEASE ORAL DAILY
Qty: 90 CAPSULE | Refills: 3 | Status: SHIPPED | OUTPATIENT
Start: 2024-08-16

## 2024-08-16 RX ORDER — OXYCODONE AND ACETAMINOPHEN 7.5; 325 MG/1; MG/1
1 TABLET ORAL EVERY 6 HOURS PRN
Qty: 120 TABLET | Refills: 0 | Status: SHIPPED | OUTPATIENT
Start: 2024-08-16 | End: 2024-09-15

## 2024-08-16 RX ORDER — MORPHINE SULFATE 30 MG/1
30 TABLET, FILM COATED, EXTENDED RELEASE ORAL 2 TIMES DAILY
Qty: 60 TABLET | Refills: 0 | Status: SHIPPED | OUTPATIENT
Start: 2024-08-16

## 2024-08-30 ENCOUNTER — PATIENT MESSAGE (OUTPATIENT)
Dept: FAMILY MEDICINE | Facility: CLINIC | Age: 64
End: 2024-08-30
Payer: MEDICARE

## 2024-08-30 DIAGNOSIS — E78.2 COMBINED HYPERLIPIDEMIA ASSOCIATED WITH TYPE 2 DIABETES MELLITUS: Primary | Chronic | ICD-10-CM

## 2024-08-30 DIAGNOSIS — J44.9 COPD WITHOUT EXACERBATION: ICD-10-CM

## 2024-08-30 DIAGNOSIS — E11.69 COMBINED HYPERLIPIDEMIA ASSOCIATED WITH TYPE 2 DIABETES MELLITUS: Primary | Chronic | ICD-10-CM

## 2024-08-30 RX ORDER — PRAVASTATIN SODIUM 40 MG/1
40 TABLET ORAL
Qty: 90 TABLET | Refills: 3 | Status: SHIPPED | OUTPATIENT
Start: 2024-08-30

## 2024-08-30 RX ORDER — MONTELUKAST SODIUM 10 MG/1
10 TABLET ORAL
Qty: 90 TABLET | Refills: 3 | Status: SHIPPED | OUTPATIENT
Start: 2024-08-30

## 2024-09-03 ENCOUNTER — TELEPHONE (OUTPATIENT)
Dept: FAMILY MEDICINE | Facility: CLINIC | Age: 64
End: 2024-09-03
Payer: MEDICARE

## 2024-09-03 DIAGNOSIS — E11.65 UNCONTROLLED TYPE 2 DIABETES MELLITUS WITH HYPERGLYCEMIA: ICD-10-CM

## 2024-09-03 DIAGNOSIS — Z79.899 ENCOUNTER FOR LONG-TERM (CURRENT) USE OF OTHER MEDICATIONS: Primary | ICD-10-CM

## 2024-09-03 DIAGNOSIS — I10 PRIMARY HYPERTENSION: ICD-10-CM

## 2024-09-03 NOTE — TELEPHONE ENCOUNTER
Does she plan to establish care with a primary provider close to home in [place]? If so, she does not need to come to this visit unless she is having any problems, or if her blood sugar has been elevated at home. I really do not need these labs. Oncology checks her CBC and CMP routinely. Her lipids have not been checked since last year, but were fine then. Really just needs A1C but if her fasting sugars have been less than 130 at home that would be improvement. I hate to see her come all the way here for an appt if she moved an hour and a half away.

## 2024-09-16 ENCOUNTER — PATIENT MESSAGE (OUTPATIENT)
Dept: HEMATOLOGY/ONCOLOGY | Facility: CLINIC | Age: 64
End: 2024-09-16
Payer: MEDICARE

## 2024-09-16 DIAGNOSIS — G89.3 CANCER ASSOCIATED PAIN: ICD-10-CM

## 2024-09-16 RX ORDER — OXYCODONE AND ACETAMINOPHEN 7.5; 325 MG/1; MG/1
1 TABLET ORAL EVERY 6 HOURS PRN
Qty: 120 TABLET | Refills: 0 | Status: SHIPPED | OUTPATIENT
Start: 2024-09-16 | End: 2024-10-16

## 2024-09-16 RX ORDER — MORPHINE SULFATE 30 MG/1
30 TABLET, FILM COATED, EXTENDED RELEASE ORAL 2 TIMES DAILY
Qty: 60 TABLET | Refills: 0 | Status: SHIPPED | OUTPATIENT
Start: 2024-09-16

## 2024-09-18 ENCOUNTER — TELEPHONE (OUTPATIENT)
Dept: HEMATOLOGY/ONCOLOGY | Facility: CLINIC | Age: 64
End: 2024-09-18

## 2024-09-18 ENCOUNTER — ANTI-COAG VISIT (OUTPATIENT)
Dept: CARDIOLOGY | Facility: CLINIC | Age: 64
End: 2024-09-18
Payer: MEDICARE

## 2024-09-18 ENCOUNTER — OFFICE VISIT (OUTPATIENT)
Dept: HEMATOLOGY/ONCOLOGY | Facility: CLINIC | Age: 64
End: 2024-09-18
Payer: MEDICARE

## 2024-09-18 DIAGNOSIS — Z79.01 LONG TERM (CURRENT) USE OF ANTICOAGULANTS: ICD-10-CM

## 2024-09-18 DIAGNOSIS — Z79.01 WARFARIN ANTICOAGULATION: Primary | ICD-10-CM

## 2024-09-18 DIAGNOSIS — Z79.01 WARFARIN ANTICOAGULATION: ICD-10-CM

## 2024-09-18 DIAGNOSIS — C57.00 FALLOPIAN TUBE CANCER, CARCINOMA, UNSPECIFIED LATERALITY: ICD-10-CM

## 2024-09-18 DIAGNOSIS — I82.5Z2 CHRONIC DEEP VEIN THROMBOSIS (DVT) OF DISTAL VEIN OF LEFT LOWER EXTREMITY: ICD-10-CM

## 2024-09-18 DIAGNOSIS — I82.90 VTE (VENOUS THROMBOEMBOLISM): Primary | ICD-10-CM

## 2024-09-18 NOTE — TELEPHONE ENCOUNTER
----- Message from Katie Luna sent at 9/18/2024 10:16 AM CDT -----  Regarding: Appt  Contact: Hoang  [phone]          Name of Caller: Hoang  with Dr. Nix      Contact Preference: [phone]     Nature of Call:  Requesting a call back would like to know if pt should stop ZEJULA 100 mg Tab, or continue tackling it

## 2024-09-18 NOTE — PROGRESS NOTES
[facility]    2024 12:37 PM    Assessment/Plan:    Patient presents today with therapeutic INR.    Assessment of patient findings and chart review: pt reports taking lower weekly dose than advised    Recommendation for patient's warfarin regimen: Continue current maintenance dose as reported per pt    Recommend repeat INR in 5 weeks  _________________________________________________________________    Mirna ROE Quinteros (64 y.o.) is followed by the [facility].    Anticoagulation Summary  As of 2024      INR goal:  2.0-2.5   TTR:  17.0% (6.7 mo)   INR used for dosin.14 (2024)   Warfarin maintenance plan:  0 mg every Wed; 7.5 mg (7.5 mg x 1) all other days   Weekly warfarin total:  45 mg   Plan last modified:  Evon Sarmiento, PharmD (2024)   Next INR check:  10/22/2024   Target end date:      Indications    Warfarin anticoagulation [Z79.01]  Long term (current) use of anticoagulants [Z79.01]  Chronic deep vein thrombosis (DVT) of distal vein of left lower extremity [I82.5Z2]                 Anticoagulation Episode Summary       INR check location:  Anticoagulation Clinic    Preferred lab:      Send INR reminders to:  [facility] COUMADIN MONITORING POOL    Comments:  [facility] [place] Phone   [phone] Fax   [phone]          Anticoagulation Care Providers       Provider Role Specialty Phone number    Emil Burgess MD [facility] Medical Oncology [phone]

## 2024-09-18 NOTE — PROGRESS NOTES
PATIENT: Mirna Quinteros  MRN: [id number]  DATE: 9/18/2024    The patient location is: [place]  The chief complaint leading to consultation is: follow-up    Visit type: audiovisual    Face to Face time with patient: 10 min    Each patient to whom he or she provides medical services by telemedicine is:  (1) informed of the relationship between the physician and patient and the respective role of any other health care provider with respect to management of the patient; and (2) notified that he or she may decline to receive medical services by telemedicine and may withdraw from such care at any time.    Subjective:     Chief complaint:  Chief Complaint   Patient presents with    Follow-up    Deep Vein Thrombosis       Heme/Onc History:  Fallopian tube carcinoma (HCC)   8/21/2021 - Hospital Admission   To ED with abdominal pain    8/21/2021 Imaging   CT A/P  1. Findings consistent with peritoneal carcinomatosis.  2. Mild pelvic ascites.  3. No other acute process    8/21/2021 Other   Component  Latest Ref Rng & Units 8/21/2021   CEA  0.00 - 2.50 ng/mL 1.09   CA 19-9  0.0 - 40.0 U/mL 19.3     0.0 - 35.0 U/mL 144.0 (H)     8/22/2021 Imaging   CT Chest  1. No findings of thoracic metastatic disease.  2. Bilateral lower lung atelectasis.  3. No thoracic adenopathy.    8/23/2021 Biopsy   Diagnostic Lap - Dr. Jordan    A. Abdominal mass #1, biopsy:   Metastatic carcinoma; see comment.     B. Abdominal mass, biopsy:   Metastatic carcinoma; see comment.     C. Abdominal mass #2, biopsy:   Fibroconnective tissue with rare atypical cells.   Additional levels reviewed.     D. Abdominal mass #3, biopsy:   Metastatic carcinoma, compatible with high grade serous carcinoma.   See diagnostic comment.     E. Liver, segment 3, biopsy:   Hepatic parenchyma with scattered chronic inflammation, fibrosis, and rare atypical cells.   Additional levels reviewed.     8/26/2021 Imaging   Pelvic U/S  1. Fibroid uterus. Normal endometrial stripe  thickness.  2. Nonvisualization of the ovaries.  3. Small amount of free fluid in the pelvis.    8/27/2021 - Chemotherapy   CARBOplatin AUC 6 / PACLitaxel 175mg/m2    C1 - In H    C2 - Taxol to 135mg/m2 due to neuropathy    C3 - Held due to Covid +, hospital admission follow due to bacteremia  Decrease Carbo dose to AUC 4 due to performance status    10/5/2021 Imaging   CT A/P (in ED due to nausea)  1. Normal CT appearance of the appendix. No acute process.  2. Decreased stranding in the omental fat suggesting response to treatment.  3. No lymphadenopathy.  4. Constipation. No bowel obstruction.  5. Bilateral airspace disease suggesting atypical pneumonia.  6. Prior cholecystectomy.    10/6/2021 - 10/8/2021 Hospital Admission   Covid pneumonia    10/14/2021 - Hospital Admission   TO ED due to fever    Treated    10/20/2021 Imaging   CT Chest  1. Interval removal of the right IJ port. There is a small hematoma at the site of port removal within the subcutaneous soft tissues of the anterior abdominal wall measuring 2.7 x 1.6 cm. Additionally, there is a filling defect within the right IJ/SVC measuring approximately 1.7 cm in length compatible with fibrin sheath versus thrombus.  2. Increasing bilateral peripheral somewhat wedge-shaped opacities within both lungs. Differential considerations include septic emboli, atypical/viral pneumonia, and pulmonary infarcts. Central pulmonary arteries appear patent    11/12/2021 Imaging   CT C/A/P (s/p 3 cycles C/T)  CHEST:  Moderate improvement in previously seen ill-defined parenchymal opacities with residual mild-to-moderate linear densities some of which have nodular thickening particularly in the left lung. Residual densities may represent residual inflammation/infection or areas of persistent post infectious scarring. A few of the more nodular components described in the left lung in the body of the report require continued follow-up.    0.3 cm subpleural right upper lobe  lung nodule stable. Continued attention on follow-up imaging.    No lymphadenopathy.    ABDOMEN/PELVIS:  No convincing evidence of new metastatic disease.    No lymphadenopathy.    Very mild residual stranding seen within the omentum without nodularity.    No ascites.    Nonspecific mild varicosities involving the lower anterior abdominal wall within the subcutaneous fat, stable.    12/13/2021 Surgery   Debulking, MARCELO, BSO, omentectomy, HIPEC, partial hepatectomy    BILATERAL FALLOPIAN TUBES: HIGH-GRADE SEROUS CARCINOMA     Procedure: Total hysterectomy and bilateral salpingo-oophorectomy   Specimen integrity: Right ovary - Capsule intact  Left ovary - Capsule intact  Right fallopian tube - Serosa intact  Left fallopian tube - Serosa intact   Tumor site: Bilateral fallopian tubes   Tumor size: See comment   Histologic type: High grade serous carcinoma   Histologic grade: High grade   Ovarian surface involvement: Not identified   Fallopian tube surface involvement: Not identified   Implants (for borderline tumors only): Not applicable   Other tissue/ organ involvement: Not identified   Largest extrapelvic peritoneal focus: Not applicable   Peritoneal/Ascitic fluid involvement: Not submitted/unknown   Chemotherapy response score (CRS): CRS3 (marked response with no or minimal residual cancer)   Regional lymph node status: All regional lymph nodes negative for tumor cells   No. of nodes with metastasis >10 mm: Not applicable   No. of nodes with metastasis <10 mm (excluding isolated tumor cells): Not applicable   No. of nodes with isolated tumor cells (0.2 mm or less): Not applicable   Number of lymph nodes examined: 1   Distant metastasis: Not applicable   AJCC 8th edition pathologic stage: pT1b, pN0,   pM not applicable - pM cannot be determined from the submitted specimen(s)  TNM Descriptors: y (posttreatment     Discharged 1/11/22. Extended hospital stay.     3/9/2022 Cancer Staged   Staging form: Ovary, Fallopian  Tube, And Primary Peritoneal Carcinoma, AJCC 8th Edition  - Pathologic: FIGO Stage IIIC (pT3c, cM0) - Signed by Casimiro Forrester MD on 3/9/2022    3/21/2022 Imaging   CT C/A/P  1. No focal consolidation or nodule in the lung. Minimal subsegmental atelectasis/scarring.  2. Postsurgical changes in the liver, pelvis and along the laparotomy scar. Small amount of fluid with air in the laparotomy scar could be due to an open wound, recommend clinical correlation.    4/1/2022 - Chemotherapy   Zejula 300mg daily    4/6 - Hold due to nausea and fatigue    4/11 - Plan to restart at 200mg daily    4/27 - Hold Zejula due to thrombocytopenia, neutropenia - Zejula not held until 4/29 5/18 - Okay to resume Zejula at 100mg daily    4/6/2022 Other   Genetic testing - Ms. Quinteros chose to proceed with [employer] xG panel.    5/9/2022 Imaging   CT A/P ( due to abdominal pain)   1. Postoperative changes again seen from a hysterectomy, bilateral salpingo-oophorectomy, omentectomy, and appendectomy. Residual infiltrative changes in the midline subcutaneous tissues again seen, compatible with postoperative scarring/granulation tissue. A chronic deep tract of gas and trace fluid along the abdominal wall musculature is not significantly changed. No enlarging or new fluid collections are identified in the anterior abdomino-pelvic wall.  2. No lymphadenopathy or evidence of metastatic disease in the abdomen or pelvis.  3. Persistent moderate linear atelectasis/scarring in the visualized lung bases, not significantly improved. If there are persistent/developing pulmonary symptoms, consider a follow-up CT chest.  6/7/2023: CT Chest- No evidence of metastatic disease in the chest. CT Abdomen & Pelvis- No evidence of recurrent or metastatic disease.            Interval History: Ms. Quinteros participates remotely in telemedicine follow-up today.   She has no complaints at this time. Tolerating warfarin fine. She has relocated to  and she is going to  transition all her care locally. She established back with Dr. Sauceda yesterday.       Review of Systems   A comprehensive review of systems was performed; pertinent positives and negatives are noted in the HPI.        ECOG Performance Status:   ECOG SCORE    0 - Fully active-able to carry on all pre-disease performance without restriction         Objective:      Vitals: There were no vitals filed for this visit.  BMI: There is no height or weight on file to calculate BMI.      Physical Exam:   No physical exam due to remote nature of the visit.        Laboratory Data:  WBC   Date Value Ref Range Status   07/16/2024 4.69 3.90 - 12.70 K/uL Final     Hemoglobin   Date Value Ref Range Status   07/16/2024 11.4 (L) 12.0 - 16.0 g/dL Final     Hematocrit   Date Value Ref Range Status   07/16/2024 33.0 (L) 37.0 - 48.5 % Final     Platelets   Date Value Ref Range Status   07/16/2024 199 150 - 450 K/uL Final     Gran # (ANC)   Date Value Ref Range Status   07/16/2024 2.5 1.8 - 7.7 K/uL Final     Comment:     The ANC is based on a white cell differential from an   automated cell counter. It has not been microscopically   reviewed for the presence of abnormal cells. Clinical   correlation is required.         Chemistry        Component Value Date/Time     07/16/2024 1057    K 4.7 07/16/2024 1057     07/16/2024 1057    CO2 29 07/16/2024 1057    BUN 17 07/16/2024 1057    CREATININE 1.0 07/16/2024 1057     (H) 07/16/2024 1057        Component Value Date/Time    CALCIUM 9.7 07/16/2024 1057    ALKPHOS 95 07/16/2024 1057    AST 73 (H) 07/16/2024 1057    ALT 93 (H) 07/16/2024 1057    BILITOT 0.3 07/16/2024 1057    ESTGFRAFRICA >60 02/05/2016 1318    EGFRNONAA >60 02/05/2016 1318              Assessment/Plan:     1. VTE (venous thromboembolism)    2. Warfarin anticoagulation    3. Fallopian tube cancer, carcinoma, unspecified laterality      1, 2. Continue indefinite anticoagulation. Care being transferred to .  3.  Follows with Dr. Hanson, though re-locating care to [place].     Med and Orders:       Follow Up:  Follow up if symptoms worsen or fail to improve.      Above care plan was discussed with patient and all questions were addressed to their expressed satisfaction.       Emil Burgess MD, FACP  Hematology & Medical Oncology  [facility]

## 2024-09-19 ENCOUNTER — PATIENT MESSAGE (OUTPATIENT)
Dept: GYNECOLOGIC ONCOLOGY | Facility: CLINIC | Age: 64
End: 2024-09-19
Payer: MEDICARE

## 2024-09-20 ENCOUNTER — OFFICE VISIT (OUTPATIENT)
Dept: PALLIATIVE MEDICINE | Facility: CLINIC | Age: 64
End: 2024-09-20
Payer: MEDICARE

## 2024-09-20 DIAGNOSIS — F41.9 ANXIETY: ICD-10-CM

## 2024-09-20 DIAGNOSIS — R63.0 ANOREXIA: ICD-10-CM

## 2024-09-20 DIAGNOSIS — G89.3 CANCER RELATED PAIN: ICD-10-CM

## 2024-09-20 DIAGNOSIS — R63.0 POOR APPETITE: ICD-10-CM

## 2024-09-20 DIAGNOSIS — I10 HYPERTENSION, UNSPECIFIED TYPE: ICD-10-CM

## 2024-09-20 DIAGNOSIS — Z51.5 ENCOUNTER FOR PALLIATIVE CARE: Primary | ICD-10-CM

## 2024-09-20 DIAGNOSIS — G47.00 INSOMNIA, UNSPECIFIED TYPE: ICD-10-CM

## 2024-09-20 DIAGNOSIS — G89.3 CANCER ASSOCIATED PAIN: ICD-10-CM

## 2024-09-20 DIAGNOSIS — K59.00 CONSTIPATION, UNSPECIFIED CONSTIPATION TYPE: ICD-10-CM

## 2024-09-20 DIAGNOSIS — G89.4 CHRONIC PAIN SYNDROME: ICD-10-CM

## 2024-09-20 DIAGNOSIS — F43.21 ADJUSTMENT DISORDER WITH DEPRESSED MOOD: ICD-10-CM

## 2024-09-20 DIAGNOSIS — R53.83 FATIGUE, UNSPECIFIED TYPE: ICD-10-CM

## 2024-09-20 DIAGNOSIS — C57.00 FALLOPIAN TUBE CANCER, CARCINOMA, UNSPECIFIED LATERALITY: ICD-10-CM

## 2024-09-20 RX ORDER — VALSARTAN AND HYDROCHLOROTHIAZIDE 80; 12.5 MG/1; MG/1
1 TABLET, FILM COATED ORAL DAILY
Qty: 30 TABLET | Refills: 0 | Status: SHIPPED | OUTPATIENT
Start: 2024-09-20 | End: 2024-10-20

## 2024-09-20 NOTE — PROGRESS NOTES
"The patient location is: [place]  The chief complaint leading to consultation is: symptom mgmt    Visit type: audiovisual    Face to Face time with patient: 30 minutes     45 minutes minutes of total time spent on the encounter, which includes face to face time and non-face to face time preparing to see the patient (eg, review of tests), Obtaining and/or reviewing separately obtained history, Documenting clinical information in the electronic or other health record, Independently interpreting results (not separately reported) and communicating results to the patient/family/caregiver, or Care coordination (not separately reported).       Each patient to whom he or she provides medical services by telemedicine is:  (1) informed of the relationship between the physician and patient and the respective role of any other health care provider with respect to management of the patient; and (2) notified that he or she may decline to receive medical services by telemedicine and may withdraw from such care at any time.    Notes:     Consult Note  Palliative Care      Consult Requested By: No ref. provider found  Reason for Consult: symptom mgmt/ACP       ASSESSMENT/PLAN:     Plan/Recommendations:    09/20/2024:  - no changes, pt will f/u with [facility] team  - one-time refill of Diovan 80-12.5 mg provided     Fallopian tube carcinoma  - ID 8/2021  - 12/2021 debulking, total hysterectomy and bilateral salpingo-oophorectomy   - following w Dr. Aditya JAMES    Encounter for palliative  - Patient is decisional  - Patient accompanied today by daughter   - HCPOA uploaded into EMR on 2/12/24  - Philosophy of Palliative Medicine reviewed with patient and family at first visit  - New patient folder given to and reviewed with patient and family at first visit  - Goals of care: To "feel normal again". Tolerable symptoms, improved pain.     Cancer pain   - now in PT which includes massaging of abdominal tissues which is helping her pain. She is " "looking forward to taking fewer medications and we discuss the weaning process for pain meds, will hold off until completed PT and feeling a little bit stronger.    - describes as stomach pain "grinding, like something moving"   - currently on MS Contin 30 mg q 12 hour with percocet 7.5 mg, has been using ~ 2.5 tabs percocet daily  - narcan available  - opioid safety sheet given at first visit  - also has tizanidine  - will continue to monitor     Adjustment disorder with anxiety and depression   9/20: mood greatly improved with relocation to   - tearful throughout initial visit  - irritable at times  - wants to feel normal and do the things she used to do  - she feels badly for the burden she imposes on her daughter, who had to move in with her to help with her care  - declines mental health counseling   - cont zoloft 100 mg daily  - emotional support provided  - [facility] care MSW present at initial visit  - will continue to monitor     Anorexia  - not taking, can no longer obtain dronabinol due to shortage   - using remeron 15 mg qhs     Constipation  - following with GI, per note: "rec daily exercise as tolerated, adequate water intake (six 8-oz glasses of water daily), and high fiber diet. OTC fiber supplements are recommended if diet does not reach daily fiber goal (20-30 grams daily), such as Metamucil, Citrucel, or FiberCon (take as directed, separate from other oral medications by >2 hours). 01/31/2024 - START: linaCLOtide (LINZESS) 72 mcg Cap capsule; Take 1 capsule (72 mcg total) by mouth before breakfast."  - lizness  - miralax  - endorses good hydration     Insomnia/fatigue  - cont trazodone 50 mg qhs prn     Understanding of illness/Prognosis: fair    Follow up: will follow up with [facility] care team     Patient's encounter and above plan of care discussed with patient's oncology team.     SUBJECTIVE:     History of Present Illness:  Patient is a 64 y.o. year old female presenting with fallopian tube " carcinoma. Please see oncology notes for full oncologic history and treatment course.     09/20/2024:  MICHAEL VELOZ reviewed    Patient presents to virtual visit alone. [place] has moved to [place] since our previous visit. Her mood has improved with her new living situation. She continues to feel stronger and is becoming more active, which is important to her. She completed PT, this was helpful. Appetite is still fair, mostly eatings 1x daily, however weight is stable. Sleeping well w medication, wakes up at 5 am to take grand kids to school. BM more regular, still using stool softener. Pain meds still working well, most recently refilled by Dr. Burgess. She has been out of BP med x 1 week, sent one time refill to pharm of choice. Patient will transition to [facility] team.     06/28/2024:   MICHAEL VELOZ reviewed    Patient presents to telehealth visit alone. She is pleasant and in good spirits, she feels herself getting better and is focused on healing and restoring to baseline. She is in PT, exercising 2 days a week. She is having some difficulty sleeping, she is up after 3 or 4 and cannot fall back to sleep. She admits to anxiety r/t her relationship with her daughter. Her daughter is struggling with the transition back to her mother being healthier and more independent, patient feels overwhelmed and controlled by her daughter and asked her to move out. Daughter does still visit frequently and this is very stressful for patient. She is working to strengthen her boundaries and build her peace.       04/05/2024:  MICHAEL VELOZ reviewed    Patient presents to telehealth visit with her daughter. She is pleasant and appears to be in good spirits. Her appetite is still poor, increased dronabinol to 10 mg. Pain has been well-controlled, regimen refilled today. Patient is anticipating upcoming colonoscopy, not yet scheduled. She is hopeful the procedure will identify source of BM symptoms. Scheduled to f/u with Dr. Hanson on 4/16.    02/12/2024:  MICHAEL VELOZ  reviewed    Patient presents to initial visit with her daughter and grandchildren. Her daughter is initially standoffish and brusk but eventually patient daughter are tearful throughout visit. Patient is in severe pain today due to very recent oral surgery. Her cancer related pain is severe and feels like something is moving inside of her, morphine helps but not enough to make her comfortable. Her goal is to feel normal again; she is currently ERIKA but feels unwell much of the time. She has been caring for her son's children for many years however now that she is sick, her adult daughter has had to put her life on hold and move in with, and help care for, her mother and nephews/nieces. Though they all seem to be struggling emotionally, they decline mental health support at this time, patient says she is thinking about it. RTC in 6-8 weeks.       Past Medical History:   Diagnosis Date    Arthritis     Asthma     C. difficile colitis     Depression     Diabetes mellitus, type 2     Diabetic neuropathy     DVT (deep venous thrombosis)     Enteritis due to Norovirus 02/22/2019    Feeding difficulty in adult 11/22/2021    Formatting of this note might be different from the original. Added automatically from request for surgery [id number] Last Assessment & Plan: Formatting of this note might be different from the original. · Patient with history of ovarian cancer s/p MARCELO/BSO, omentectomy, radical tumor debulking, cytoreduction, appendectomy, partial hepatectomy, and HIPEC per Dr. Forrester and Dr. Jordan 12/13 with postope    GERD (gastroesophageal reflux disease)     Hyperlipidemia     Hypertension     Internal hemorrhoids     Pancreatitis 09/23/2018    Last Assessment & Plan: Formatting of this note might be different from the original. Mirna is a  58 year old female presenting with abdominal pain.  CT abd/pevis w/o contrast revealed acute pancreatitis involving pancreatic head with no fluid collections/necrosis.  EGD from  "9/24/2018 unremarkable. Assessment: Today patient is sitting up and interactive.  No acute stress obvious.   Reports pain in    Pulmonary embolism     Respiratory failure with hypoxia 10/06/2021    Rheumatoid arthritis(714.0)     Right upper quadrant pain 08/22/2021    SOB (shortness of breath) 02/24/2023     Past Surgical History:   Procedure Laterality Date    CARPAL TUNNEL RELEASE Left     CHOLECYSTECTOMY      COLONOSCOPY      about 10 years ago    COLONOSCOPY N/A 4/9/2024    Procedure: COLONOSCOPY;  Surgeon: Abbie Still MD;  Location: [facility];  Service: Endoscopy;  Laterality: N/A;    KNEE ARTHROSCOPY      KNEE SURGERY      TUBAL LIGATION      UPPER GASTROINTESTINAL ENDOSCOPY       Family History   Problem Relation Name Age of Onset    Heart disease Mother      Hypertension Mother      Diabetes Father      Stroke Father      Hypertension Father      Stroke Sister      Stroke Maternal Aunt      Stroke Maternal Aunt      Breast cancer Neg Hx      Colon cancer Neg Hx      Ovarian cancer Neg Hx      Colon polyps Neg Hx      Esophageal cancer Neg Hx      Stomach cancer Neg Hx       Review of patient's allergies indicates:   Allergen Reactions    Liraglutide Other (See Comments)     pancreatitis    Empagliflozin      Other reaction(s): yeast infxn    Glyburide      Other reaction(s): unknown    Metformin      Other reaction(s): GI upset    Pioglitazone      Other reaction(s): Swelling    Iohexol Itching     Patient given 50mg benadryl im and itching subsided       Medications:    Current Outpatient Medications:     aspirin 81 mg Cap, Take 81 mg by mouth once daily., Disp: , Rfl:     BD INSULIN PEN NEEDLE UF MINI 31 x 3/16 " Ndle, , Disp: , Rfl: 0    droNABinol (MARINOL) 5 MG capsule, Take 2 capsules (10 mg total) by mouth 2 (two) times daily before meals., Disp: 60 capsule, Rfl: 0    ezetimibe (ZETIA) 10 mg tablet, Take 1 tablet (10 mg total) by mouth once daily., Disp: 90 tablet, Rfl: 2    flash glucose " sensor (FREESTYLE LUC 2 SENSOR) Kit, 1 each by Misc.(Non-Drug; Combo Route) route as needed., Disp: 1 kit, Rfl: 6    furosemide (LASIX) 40 MG tablet, Take 1 tablet (40 mg total) by mouth 2 (two) times a day., Disp: 180 tablet, Rfl: 3    hydrOXYzine HCL (ATARAX) 10 MG Tab, Take 10 mg by mouth 3 (three) times daily as needed., Disp: , Rfl:     insulin aspart (NOVOLOG FLEXPEN) 100 unit/mL InPn, Inject 8 Units into the skin 3 (three) times daily with meals., Disp: 1 Box, Rfl: 6    insulin degludec (TRESIBA FLEXTOUCH U-100) 100 unit/mL (3 mL) insulin pen, Inject 32 Units into the skin., Disp: , Rfl:     lansoprazole (PREVACID) 30 MG capsule, Take 1 capsule (30 mg total) by mouth once daily., Disp: 90 capsule, Rfl: 3    linaCLOtide (LINZESS) 72 mcg Cap capsule, TAKE 1 CAPSULE (72 MCG TOTAL) BY MOUTH BEFORE BREAKFAST, Disp: 90 capsule, Rfl: 3    montelukast (SINGULAIR) 10 mg tablet, TAKE 1 TABLET EVERY DAY, Disp: 90 tablet, Rfl: 3    morphine (MS CONTIN) 30 MG 12 hr tablet, Take 1 tablet (30 mg total) by mouth 2 (two) times daily., Disp: 60 tablet, Rfl: 0    MOUNJARO 2.5 mg/0.5 mL PnIj, INJECT 2.5 MG SUBCUTANEOUSLY WEEKLY, Disp: , Rfl:     ondansetron (ZOFRAN) 4 MG tablet, Take 1 tablet (4 mg total) by mouth every 8 (eight) hours as needed (Nausea and vomiting)., Disp: 12 tablet, Rfl: 0    oxyCODONE-acetaminophen (PERCOCET) 7.5-325 mg per tablet, Take 1 tablet by mouth every 6 (six) hours as needed for Pain., Disp: 120 tablet, Rfl: 0    pravastatin (PRAVACHOL) 40 MG tablet, TAKE 1 TABLET EVERY DAY, Disp: 90 tablet, Rfl: 3    rosuvastatin (CRESTOR) 40 MG Tab, Take 20 mg by mouth once daily., Disp: , Rfl:     sertraline (ZOLOFT) 50 MG tablet, Take 1 tablet (50 mg total) by mouth once daily., Disp: 90 tablet, Rfl: 0    sumatriptan (IMITREX) 50 MG tablet, Take by mouth., Disp: , Rfl:     SYMBICORT 160-4.5 mcg/actuation HFAA, Inhale 2 puffs into the lungs every 12 (twelve) hours., Disp: , Rfl:     tiZANidine 4 mg Cap, Take 4  mg by mouth 2 (two) times daily as needed., Disp: , Rfl:     traZODone (DESYREL) 50 MG tablet, Take 1 tablet (50 mg total) by mouth every evening., Disp: 90 tablet, Rfl: 0    valsartan-hydrochlorothiazide (DIOVAN-HCT) 80-12.5 mg per tablet, Take 1 tablet by mouth once daily., Disp: , Rfl:     warfarin (COUMADIN) 7.5 MG tablet, Mon, Tue, Thurs, Fri, Sat, Sun. (Daily EXCEPT Wednesday), Disp: 60 tablet, Rfl: 3    ZEJULA 100 mg Tab, Take 1 tablet by mouth every morning., Disp: , Rfl:     OBJECTIVE:       ROS:  Review of Systems   Constitutional:  Positive for appetite change, fatigue and unexpected weight change.   Eyes:  Negative for pain, discharge and itching.   Respiratory:  Negative for cough, choking and shortness of breath.    Cardiovascular:  Negative for chest pain, palpitations and leg swelling.   Gastrointestinal:  Positive for abdominal pain. Negative for diarrhea, nausea and vomiting.   Endocrine: Negative for polydipsia, polyphagia and polyuria.   Genitourinary:  Negative for difficulty urinating, dyspareunia and dysuria.   Musculoskeletal:  Positive for arthralgias.   Skin:  Negative for pallor, rash and wound.   Neurological:  Positive for weakness.   Psychiatric/Behavioral:  Positive for sleep disturbance. The patient is nervous/anxious.        Review of Symptoms      Symptom Assessment (ESAS 0-10 Scale)  Pain:  9  Dyspnea:  0  Anxiety:  0  Nausea:  0  Depression:  0  Anorexia:  6  Fatigue:  10  Insomnia:  9  Restlessness:  0  Agitation:  0     CAM / Delirium:  Negative  Constipation:  Positive  Diarrhea:  Negative    Anxiety:  Is nervous/anxious    Bowel Management Plan (BMP):  Yes      Pain Assessment:  OME in 24 hours:  75  Location(s): abdomen    Abdomen       Location: generalized        Quality: Cramping        Quantity: 9/10 in intensity        Chronicity: Onset 0 second(s) ago, unchanged        Aggravating Factors: None        Alleviating Factors: Opiates        Associated Symptoms:  None    Modified Patrick Scale:  0    Psychosocial/Cultural:   See Palliative Psychosocial Note: Yes  **Primary  to Follow**  Palliative Care  Consult: No      Advance Care Planning   Advance Directives:   Living Will: No    Medical Power of : Yes      Decision Making:  Patient answered questions  Goals of Care: What is most important right now is to focus on remaining as independent as possible, symptom/pain control, extending life as long as possible, even it it means sacrificing quality. Accordingly, we have decided that the best plan to meet the patient's goals includes continuing with treatment.          Physical Exam:  Vitals:    Telehealth visit    Physical Exam  Constitutional:       General: She is not in acute distress.     Appearance: She is not diaphoretic.   HENT:      Head: Normocephalic and atraumatic.      Right Ear: External ear normal.      Left Ear: External ear normal.      Nose: Nose normal.   Eyes:      General:         Right eye: No discharge.         Left eye: No discharge.      Extraocular Movements: Extraocular movements intact.      Conjunctiva/sclera: Conjunctivae normal.   Pulmonary:      Effort: Pulmonary effort is normal. No respiratory distress.      Breath sounds: No stridor.   Musculoskeletal:      Cervical back: Normal range of motion.   Skin:     General: Skin is warm and dry.      Coloration: Skin is not jaundiced.      Findings: No bruising.   Neurological:      Mental Status: She is alert and oriented to person, place, and time. Mental status is at baseline.   Psychiatric:         Attention and Perception: Attention normal.         Mood and Affect: Mood and affect normal.         Labs:  CBC:   WBC   Date Value Ref Range Status   07/16/2024 4.69 3.90 - 12.70 K/uL Final     Hemoglobin   Date Value Ref Range Status   07/16/2024 11.4 (L) 12.0 - 16.0 g/dL Final     Hematocrit   Date Value Ref Range Status   07/16/2024 33.0 (L) 37.0 - 48.5 % Final      MCV   Date Value Ref Range Status   07/16/2024 90 82 - 98 fL Final     Platelets   Date Value Ref Range Status   07/16/2024 199 150 - 450 K/uL Final       LFT:   Lab Results   Component Value Date    AST 73 (H) 07/16/2024    ALKPHOS 95 07/16/2024    BILITOT 0.3 07/16/2024       Albumin:   Albumin   Date Value Ref Range Status   07/16/2024 3.4 (L) 3.5 - 5.2 g/dL Final     Protein:   Total Protein   Date Value Ref Range Status   07/16/2024 7.0 6.0 - 8.4 g/dL Final       Radiology:I have reviewed all pertinent imaging results/findings within the past 24 hours.    02/06/2024 CT AP: IMPRESSION:  No acute or significant abnormality seen in the abdomen or pelvis.  Prior cholecystectomy and hysterectomy     This exam was performed according to our departmental dose-optimization program which includes automated exposure control, adjustment of the mA and/or kV according to patient size and/or use of iterative reconstruction technique.      Signature: Chasity Morris, DNP

## 2024-09-23 ENCOUNTER — TELEPHONE (OUTPATIENT)
Dept: GYNECOLOGIC ONCOLOGY | Facility: CLINIC | Age: 64
End: 2024-09-23
Payer: MEDICARE

## 2024-09-23 NOTE — TELEPHONE ENCOUNTER
"Returned call to Dr. Sauceda's office in regards to zejula maintenance. Given plan per Dr. Hanson's most recent note "Continue niraparib maintenance as above. Advised a total of 3 years". Pt started in 4/2022    ----- Message from Camryn West sent at 9/23/2024  2:35 PM CDT -----  Regarding: nurse call back / rx issue  Name of Who is Calling: Hoang/ Dr. Sauceda office  [phone]          What is the request in detail:pt has been on Zejula for over  2 years. They are asking if she can stop taking the medication. Please call Dr Sauceda office to assist .  "

## 2024-09-26 ENCOUNTER — PATIENT MESSAGE (OUTPATIENT)
Dept: PALLIATIVE MEDICINE | Facility: CLINIC | Age: 64
End: 2024-09-26
Payer: MEDICARE

## 2024-09-26 DIAGNOSIS — G89.3 CANCER ASSOCIATED PAIN: ICD-10-CM

## 2024-09-26 RX ORDER — MORPHINE SULFATE 30 MG/1
30 TABLET, FILM COATED, EXTENDED RELEASE ORAL 2 TIMES DAILY
Qty: 60 TABLET | Refills: 0 | OUTPATIENT
Start: 2024-09-26

## 2024-09-26 RX ORDER — OXYCODONE AND ACETAMINOPHEN 7.5; 325 MG/1; MG/1
1 TABLET ORAL EVERY 6 HOURS PRN
Qty: 120 TABLET | Refills: 0 | Status: SHIPPED | OUTPATIENT
Start: 2024-09-26 | End: 2024-10-26

## 2024-10-02 DIAGNOSIS — E11.69 COMBINED HYPERLIPIDEMIA ASSOCIATED WITH TYPE 2 DIABETES MELLITUS: Chronic | ICD-10-CM

## 2024-10-02 DIAGNOSIS — G89.3 CANCER ASSOCIATED PAIN: ICD-10-CM

## 2024-10-02 DIAGNOSIS — E78.2 COMBINED HYPERLIPIDEMIA ASSOCIATED WITH TYPE 2 DIABETES MELLITUS: Chronic | ICD-10-CM

## 2024-10-02 RX ORDER — EZETIMIBE 10 MG/1
10 TABLET ORAL
Qty: 90 TABLET | Refills: 3 | Status: SHIPPED | OUTPATIENT
Start: 2024-10-02

## 2024-10-02 RX ORDER — OXYCODONE AND ACETAMINOPHEN 7.5; 325 MG/1; MG/1
1 TABLET ORAL EVERY 6 HOURS PRN
Qty: 120 TABLET | Refills: 0 | Status: SHIPPED | OUTPATIENT
Start: 2024-10-02 | End: 2024-11-01

## 2024-10-22 ENCOUNTER — LAB VISIT (OUTPATIENT)
Dept: LAB | Facility: HOSPITAL | Age: 64
End: 2024-10-22
Attending: INTERNAL MEDICINE
Payer: MEDICARE

## 2024-10-22 ENCOUNTER — TELEPHONE (OUTPATIENT)
Dept: CARDIOLOGY | Facility: CLINIC | Age: 64
End: 2024-10-22
Payer: MEDICARE

## 2024-10-22 ENCOUNTER — ANTI-COAG VISIT (OUTPATIENT)
Dept: CARDIOLOGY | Facility: CLINIC | Age: 64
End: 2024-10-22
Payer: MEDICARE

## 2024-10-22 DIAGNOSIS — Z79.01 LONG TERM (CURRENT) USE OF ANTICOAGULANTS: ICD-10-CM

## 2024-10-22 DIAGNOSIS — I82.5Z2 CHRONIC DEEP VEIN THROMBOSIS (DVT) OF DISTAL VEIN OF LEFT LOWER EXTREMITY: ICD-10-CM

## 2024-10-22 DIAGNOSIS — Z79.01 WARFARIN ANTICOAGULATION: Primary | ICD-10-CM

## 2024-10-22 DIAGNOSIS — Z79.01 WARFARIN ANTICOAGULATION: ICD-10-CM

## 2024-10-22 LAB
INR PPP: 2.9 (ref 0.8–1.2)
PROTHROMBIN TIME: 29.5 SEC (ref 9–12.5)

## 2024-10-22 PROCEDURE — 85610 PROTHROMBIN TIME: CPT | Performed by: INTERNAL MEDICINE

## 2024-10-22 PROCEDURE — 36415 COLL VENOUS BLD VENIPUNCTURE: CPT | Performed by: INTERNAL MEDICINE

## 2024-10-22 PROCEDURE — 93793 ANTICOAG MGMT PT WARFARIN: CPT | Mod: S$GLB,,,

## 2024-10-22 NOTE — PROGRESS NOTES
[facility]    10/22/2024 10:51 AM    Assessment/Plan:    Patient presents today with supratherapeutic INR.    Assessment of patient findings and chart review: pt reports consuming ETOH ~2 weeks ago, otherwise no changes reported    Recommendation for patient's warfarin regimen: Lower dose today to 3.75mg then resume current maintenance dose    Recommend repeat INR in 2 weeks  _________________________________________________________________    Mirna JYOTHI Aldair (64 y.o.) is followed by the [facility].    Anticoagulation Summary  As of 10/22/2024      INR goal:  2.0-2.5   TTR:  22.9% (10.4 mo)   INR used for dosin.9 (10/22/2024)   Warfarin maintenance plan:  0 mg every Wed; 7.5 mg (7.5 mg x 1) all other days   Weekly warfarin total:  45 mg   Plan last modified:  Evon Sarmiento, PharmD (2024)   Next INR check:  2024   Target end date:  --    Indications    Warfarin anticoagulation [Z79.01]  Long term (current) use of anticoagulants [Z79.01]  Chronic deep vein thrombosis (DVT) of distal vein of left lower extremity [I82.5Z2]                 Anticoagulation Episode Summary       INR check location:  Anticoagulation Clinic    Preferred lab:  --    Send INR reminders to:  [facility] COUMADIN MONITORING POOL    Comments:  [facility] [place] Phone   [phone] Fax   [phone]          Anticoagulation Care Providers       Provider Role Specialty Phone number    Emil Burgess MD [facility] [phone]

## 2024-10-22 NOTE — TELEPHONE ENCOUNTER
LVM for pt to call the [facility] back.  Pt and [facility] reports would like to switch care to our area.  Will need to confirm with pt and MD.

## 2024-11-05 ENCOUNTER — LAB VISIT (OUTPATIENT)
Dept: LAB | Facility: HOSPITAL | Age: 64
End: 2024-11-05
Attending: INTERNAL MEDICINE
Payer: MEDICARE

## 2024-11-05 ENCOUNTER — ANTI-COAG VISIT (OUTPATIENT)
Dept: CARDIOLOGY | Facility: CLINIC | Age: 64
End: 2024-11-05
Payer: MEDICARE

## 2024-11-05 DIAGNOSIS — Z79.01 LONG TERM (CURRENT) USE OF ANTICOAGULANTS: ICD-10-CM

## 2024-11-05 DIAGNOSIS — I82.5Z2 CHRONIC DEEP VEIN THROMBOSIS (DVT) OF DISTAL VEIN OF LEFT LOWER EXTREMITY: ICD-10-CM

## 2024-11-05 DIAGNOSIS — Z79.01 WARFARIN ANTICOAGULATION: Primary | ICD-10-CM

## 2024-11-05 DIAGNOSIS — Z79.01 WARFARIN ANTICOAGULATION: ICD-10-CM

## 2024-11-05 LAB
INR PPP: 2.9 (ref 0.8–1.2)
PROTHROMBIN TIME: 29.5 SEC (ref 9–12.5)

## 2024-11-05 PROCEDURE — 36415 COLL VENOUS BLD VENIPUNCTURE: CPT | Performed by: INTERNAL MEDICINE

## 2024-11-05 PROCEDURE — 85610 PROTHROMBIN TIME: CPT | Performed by: INTERNAL MEDICINE

## 2024-11-05 NOTE — PROGRESS NOTES
INR not at goal-2.9. Medications, chart, and patient findings reviewed. See calendar for adjustments to dose and follow up plan.  We will plan on holding her dose this week, she skips tomorrow, then resume per calendar.  We will need to establish which [facility] MD will be following her for our CC.  Pt reports that she does not have an [facility] MD.  Pt is seeing Dr Sauceda at [facility], we will need to close her chart.  she will request that her monitoring be switched to [facility] coumadin clinic.  She has an appointment at [facility] per report on 11/19/24.  We will dose today, but not set up any further labs and close chart.  I will set a reminder to call her afterwards on 11/20/24 to be sure she was referred.

## 2024-11-18 ENCOUNTER — PATIENT MESSAGE (OUTPATIENT)
Dept: CARDIOLOGY | Facility: CLINIC | Age: 64
End: 2024-11-18
Payer: MEDICARE

## 2024-11-21 ENCOUNTER — PATIENT MESSAGE (OUTPATIENT)
Dept: CARDIOLOGY | Facility: CLINIC | Age: 64
End: 2024-11-21
Payer: MEDICARE

## 2024-11-24 DIAGNOSIS — K21.9 GASTROESOPHAGEAL REFLUX DISEASE, UNSPECIFIED WHETHER ESOPHAGITIS PRESENT: ICD-10-CM

## 2024-11-24 DIAGNOSIS — G89.3 CANCER ASSOCIATED PAIN: ICD-10-CM

## 2024-11-25 RX ORDER — MORPHINE SULFATE 30 MG/1
30 TABLET, FILM COATED, EXTENDED RELEASE ORAL 2 TIMES DAILY
Qty: 60 TABLET | Refills: 0 | Status: SHIPPED | OUTPATIENT
Start: 2024-11-25

## 2024-11-25 RX ORDER — LANSOPRAZOLE 30 MG/1
30 CAPSULE, DELAYED RELEASE ORAL DAILY
Qty: 90 CAPSULE | Refills: 3 | Status: SHIPPED | OUTPATIENT
Start: 2024-11-25

## 2024-12-11 DIAGNOSIS — G89.3 CANCER ASSOCIATED PAIN: ICD-10-CM

## 2024-12-11 RX ORDER — MORPHINE SULFATE 30 MG/1
30 TABLET, FILM COATED, EXTENDED RELEASE ORAL 2 TIMES DAILY
Qty: 60 TABLET | Refills: 0 | Status: SHIPPED | OUTPATIENT
Start: 2024-12-11

## 2025-01-02 ENCOUNTER — PATIENT MESSAGE (OUTPATIENT)
Dept: CARDIOLOGY | Facility: CLINIC | Age: 65
End: 2025-01-02
Payer: MEDICARE

## 2025-01-23 DIAGNOSIS — K59.00 CONSTIPATION, UNSPECIFIED CONSTIPATION TYPE: ICD-10-CM

## 2025-01-23 RX ORDER — LINACLOTIDE 72 UG/1
72 CAPSULE, GELATIN COATED ORAL
Qty: 90 CAPSULE | Refills: 3 | OUTPATIENT
Start: 2025-01-23

## 2025-04-20 DIAGNOSIS — I82.90 VTE (VENOUS THROMBOEMBOLISM): ICD-10-CM

## 2025-04-21 RX ORDER — WARFARIN 7.5 MG/1
TABLET ORAL
Qty: 90 TABLET | Refills: 1 | Status: SHIPPED | OUTPATIENT
Start: 2025-04-21

## 2025-06-04 DIAGNOSIS — E11.9 TYPE 2 DIABETES MELLITUS WITHOUT COMPLICATION: ICD-10-CM

## 2025-06-09 ENCOUNTER — PATIENT MESSAGE (OUTPATIENT)
Dept: ADMINISTRATIVE | Facility: HOSPITAL | Age: 65
End: 2025-06-09
Payer: MEDICARE

## 2025-06-10 ENCOUNTER — PATIENT OUTREACH (OUTPATIENT)
Dept: ADMINISTRATIVE | Facility: HOSPITAL | Age: 65
End: 2025-06-10
Payer: MEDICARE

## 2025-06-10 NOTE — PROGRESS NOTES
Population Health Chart Review & Patient Outreach Details      Additional [facility] Notes:               Updates Requested / Reviewed:      Updated Care Coordination Note, Care Everywhere, , External Sources: [facility] and [facility], and Immunizations Reconciliation Completed or Queried: [place]         Health Maintenance Topics Overdue:      [facility] Score: 3     Urine Screening  Eye Exam  Hemoglobin A1c  Uncontrolled BP    Tetanus Vaccine  Shingles/Zoster Vaccine  RSV Vaccine                  Health Maintenance Topic(s) Outreach Outcomes & Actions Taken:    Lab(s) - Outreach Outcomes & Actions Taken  : External Records Requested & Care Team Updated if Applicable

## 2025-06-10 NOTE — LETTER
AUTHORIZATION FOR RELEASE OF   CONFIDENTIAL INFORMATION    Dear [facility] ,    We are seeing Mirna Quinteros, date of birth 1960, in the clinic at [facility]. Hazel Veras APRN-CNP is the patient's PCP. Mirna Quinteros has an outstanding lab/procedure at the time we reviewed her chart. In order to help keep her health information updated, she has authorized us to request the following medical record(s):                                ( X )  OUTSIDE LAB RESULTS         Please fax records to [facility], Shields, Jennifer, APRN-CNP,  at [phone] or email to [email or web address].       If you have any questions, please contact     Shelia BENNETT  Clinical Care Coordinator    [facility] / [facility]  [phone] (Phone)  [phone] (Fax)      Patient Name: Mirna Quinteros  : 1960  Patient Phone #: [phone]                     Mirna Quinteros  MRN: [id number]  : 1960  Age: 64 y.o.  Sex: female         Patient/Legal Guardian Signature  This signature was collected at 10/20/2024           _______________________________   Printed Name/Relationship to Patient      Consent for Examination and Treatment: I hereby authorize the providers and employees of [facility] ([facility]) to provide medical treatment/services which includes, but is not limited to, performing and administering tests and diagnostic procedures that are deemed necessary, including, but not limited to, imaging examinations, blood tests and other laboratory procedures as may be required by the hospital, clinic, or may be ordered by my physician(s) or persons working under the general and/or special instructions of my physician(s).      I understand and agree that this consent covers all authorized persons, including but not limited to physicians, residents, nurse practitioners, physicians' assistants, specialists, consultants, student nurses, and independently  contracted physicians, who are called upon by the physician in charge, to carry out the diagnostic procedures and medical or surgical treatment.     I hereby authorize [facility] to retain or dispose of any specimens or tissue, should there be such remaining from any test or procedure.     I hereby authorize and give consent for [facility] providers and employees to take photographs, images or videotapes of such diagnostic, surgical or treatment procedures of Patient as may be required by [facility] or as may be ordered by a physician. I further acknowledge and agree that [facility] may use cameras or other devices for patient monitoring.     I am aware that the practice of medicine is not an exact science, and I acknowledge that no guarantees have been made to me as to the outcome of any tests, procedures or treatment.     Authorization for Release of Information: I understand that my insurance company and/or their agents may need information necessary to make determinations about payment/reimbursement. I hereby provide authorization to release to all insurance companies, their successors, assignees, other parties with whom they may have contracted, or others acting on their behalf, that are involved with payment for any hospital and/or clinic charges incurred by the patient, any information that they request and deem necessary for payment/reimbursement, and/or quality review.  I further authorize the release of my health information to physicians or other health care practitioners on staff who are involved in my health care now and in the future, and to other health care providers, entities, or institutions for the purpose of my continued care and treatment, including referrals.     REGISTRATION AUTHORIZATION  Form No. 15260 (Rev. 3/25/2024)    Page 1 of 3                       Medicare Patient's Certification and Authorization to Release Information and Payment Request:  I certify that the information given by me in applying  for payment under Title XVIII of the Social Security Act is correct. I authorize any celis of medical or other information about me to release to the Social SecurityAdministration, or its intermediaries or carriers, any information needed for this or a related Medicare claim. I request that payment of authorized benefits be made on my behalf.     Assignment of Insurance Benefits:   I hereby authorize any and all insurance companies, health plans, defined   benefit plans, health insurers or any entity that is or may be responsible for payment of my medical expenses to pay all hospital and medical benefits now due, and to become due and payable to me under any hospital benefits, sick benefits, injury benefits or any other benefit for services rendered to me, including Major Medical Benefits, direct to [facility] and all independently contracted physicians. I assign any and all rights that I may have against any and all insurance companies, health plans, defined benefit plans, health insurers or any entity that is or may be responsible for payment of my medical expenses, including, but not limited to any right to appeal a denial of a claim, any right to bring any action, lawsuit, administrative proceeding, or other cause of action on my behalf. I specifically assign my right to pursue litigation against any and all insurance companies, health plans, defined benefit plans, health insurers or any entity that is or may be responsible for payment of my medical expenses based upon a refusal to pay charges.            E. Valuables: It is understood and agreed that [facility] is not liable for the damage to or loss of any money, jewelry,   documents, dentures, eye glasses, hearing aids, prosthetics, or other property of value.     F. Computer Equipment: I understand and agree that should I choose to use computer equipment owned by [facility] or if I choose to access the Internet via [facility]s network, I do so at my own risk. [facility]  is not responsible for any damage to my computer equipment or to any damages of any type that might arise from my loss of equipment or data.     G. Acceptance of Financial Responsibility:  I agree that in consideration of the services and   supplies that have been   or will be furnished to the patient, I am hereby obligated to pay all charges made for or on the account of the patient according to the standard rates (in effect at the time the services and supplies are delivered) established by [facility], including its Patient Financial Assistance Policy to the extent it is applicable. I understand that I am responsible for all charges, or portions thereof, not covered by insurance or other sources. Patient refunds will be distributed only after balances at all [facility] facilities are paid.     H. Communication Authorization:  I hereby authorize [facility] and its representatives, along with any billing service   or  who may work on their behalf, to contact me on   my cell phone and/or home phone using pre- recorded messages, artificial voice messages, automatic telephone dialing devices or other computer assisted technology, or by electronic      mail, text messaging, or by any other form of electronic communication. This includes, but is not limited to, appointment reminders, yearly physical exam reminders, preventive care reminders, patient campaigns, welcome calls, and calls about account balances on my account or any account on which I am listed as a guarantor. I understand I have the right to opt out of these communications at any time.      Relationship  Between  Facility and  Provider:      I understand that some, but not all, providers furnishing services to the patient are not employees or agents of [facility]. The patient is under the care and supervision of his/her attending physician, and it is the responsibility of the facility and its nursing staff to carry out the instructions of such  physicians. It is the responsibility of the patient's physician/designee to obtain the patient's informed consent, when required, for medical or surgical treatment, special diagnostic or therapeutic procedures, or hospital services rendered for the patient under the special instructions of the physician/designee.           REGISTRATION AUTHORIZATION  Form No. 05941 (Rev. 3/25/2024)    Page 2 of 3                       Immunizations: [facility] shares immunization information with state sponsored health departments to help you and your doctor keep track of your immunization records. By signing, you consent to have this information shared with the health department in your state:                                [place] - LINKS ([place] Immunization Network for Kids Statewide)                                [place] - MIIX ([place] Immunization Information eXchange)                                [place] - ImmPRINT (Immunization Patient Registry with Integrated Technology)     TERM: This authorization is valid for this and subsequent care/treatment I receive at [facility] and will remain valid unless/until revoked in writing by me.     [facility]: As used in this document, [facility] means all [facility] owned and managed facilities, including, but not limited to, all health centers, surgery centers, clinics, urgent care centers, and hospitals.         [facility] complies with applicable Federal civil rights laws and does not discriminate on the basis of race, color, national origin, age, disability, or sex.  ATENCIÓN: si moncho garcia, tiene a andujar disposición servicios gratuitos de asistencia lingüística. Llame al [phone].  CHÚ Ý: N?u b?n nói Ti?ng Vi?t, có các d?ch v? h? tr? ngôn ng? mi?n phí dành cho b?n. G?i s? [phone].        REGISTRATION AUTHORIZATION  Form No. 03421 (Rev. 3/25/2024)   Page 3 of 3

## 2025-07-30 ENCOUNTER — PATIENT MESSAGE (OUTPATIENT)
Dept: PALLIATIVE MEDICINE | Facility: CLINIC | Age: 65
End: 2025-07-30
Payer: MEDICARE